# Patient Record
Sex: MALE | Race: WHITE | NOT HISPANIC OR LATINO | Employment: UNEMPLOYED | URBAN - NONMETROPOLITAN AREA
[De-identification: names, ages, dates, MRNs, and addresses within clinical notes are randomized per-mention and may not be internally consistent; named-entity substitution may affect disease eponyms.]

---

## 2019-07-12 ENCOUNTER — TRANSCRIBE ORDERS (OUTPATIENT)
Dept: ADMINISTRATIVE | Facility: HOSPITAL | Age: 39
End: 2019-07-12

## 2019-07-12 ENCOUNTER — LAB (OUTPATIENT)
Dept: LAB | Facility: HOSPITAL | Age: 39
End: 2019-07-12

## 2019-07-12 ENCOUNTER — HOSPITAL ENCOUNTER (OUTPATIENT)
Dept: INFUSION THERAPY | Facility: HOSPITAL | Age: 39
Setting detail: INFUSION SERIES
Discharge: HOME | End: 2019-07-12

## 2019-07-12 VITALS
RESPIRATION RATE: 18 BRPM | HEIGHT: 69 IN | WEIGHT: 200 LBS | DIASTOLIC BLOOD PRESSURE: 74 MMHG | SYSTOLIC BLOOD PRESSURE: 120 MMHG | BODY MASS INDEX: 29.62 KG/M2 | TEMPERATURE: 97.9 F | OXYGEN SATURATION: 95 % | HEART RATE: 75 BPM

## 2019-07-12 DIAGNOSIS — D75.1 POLYCYTHEMIA: Primary | ICD-10-CM

## 2019-07-12 DIAGNOSIS — D75.1 POLYCYTHEMIA, SECONDARY: Primary | ICD-10-CM

## 2019-07-12 DIAGNOSIS — D75.1 POLYCYTHEMIA, SECONDARY: ICD-10-CM

## 2019-07-12 LAB
DEPRECATED RDW RBC AUTO: 49.6 FL (ref 37–54)
ERYTHROCYTE [DISTWIDTH] IN BLOOD BY AUTOMATED COUNT: 16.8 % (ref 12.3–15.4)
HCT VFR BLD AUTO: 46 % (ref 37.5–51)
HGB BLD-MCNC: 14.9 G/DL (ref 13–17.7)
MCH RBC QN AUTO: 27.2 PG (ref 26.6–33)
MCHC RBC AUTO-ENTMCNC: 32.4 G/DL (ref 31.5–35.7)
MCV RBC AUTO: 83.9 FL (ref 79–97)
PLATELET # BLD AUTO: 385 10*3/MM3 (ref 140–450)
PMV BLD AUTO: 9.4 FL (ref 6–12)
RBC # BLD AUTO: 5.48 10*6/MM3 (ref 4.14–5.8)
WBC NRBC COR # BLD: 10.33 10*3/MM3 (ref 3.4–10.8)

## 2019-07-12 PROCEDURE — 85027 COMPLETE CBC AUTOMATED: CPT

## 2019-07-12 PROCEDURE — 36415 COLL VENOUS BLD VENIPUNCTURE: CPT

## 2019-07-12 PROCEDURE — 99195 PHLEBOTOMY: CPT

## 2019-07-12 RX ORDER — ASPIRIN 81 MG/1
81 TABLET ORAL DAILY
COMMUNITY
End: 2022-09-26

## 2019-07-12 RX ORDER — SODIUM CHLORIDE 9 MG/ML
250 INJECTION, SOLUTION INTRAVENOUS ONCE
Status: CANCELLED | OUTPATIENT
Start: 2019-07-12

## 2019-07-12 RX ORDER — SODIUM CHLORIDE 9 MG/ML
250 INJECTION, SOLUTION INTRAVENOUS ONCE
Status: DISCONTINUED | OUTPATIENT
Start: 2019-07-12 | End: 2019-07-14 | Stop reason: HOSPADM

## 2019-07-12 RX ORDER — CITALOPRAM 20 MG/1
20 TABLET ORAL DAILY
COMMUNITY
End: 2021-08-06 | Stop reason: SDUPTHER

## 2019-07-12 RX ORDER — LISINOPRIL 10 MG/1
10 TABLET ORAL DAILY
COMMUNITY
End: 2021-12-28 | Stop reason: SDUPTHER

## 2019-07-26 ENCOUNTER — HOSPITAL ENCOUNTER (OUTPATIENT)
Dept: INFUSION THERAPY | Facility: HOSPITAL | Age: 39
Setting detail: INFUSION SERIES
Discharge: HOME | End: 2019-07-26

## 2019-07-26 VITALS
HEIGHT: 69 IN | HEART RATE: 74 BPM | DIASTOLIC BLOOD PRESSURE: 80 MMHG | WEIGHT: 200 LBS | BODY MASS INDEX: 29.62 KG/M2 | SYSTOLIC BLOOD PRESSURE: 140 MMHG | TEMPERATURE: 98.4 F

## 2019-07-26 DIAGNOSIS — D75.1 POLYCYTHEMIA: Primary | ICD-10-CM

## 2019-07-26 LAB
DEPRECATED RDW RBC AUTO: 49.4 FL (ref 37–54)
ERYTHROCYTE [DISTWIDTH] IN BLOOD BY AUTOMATED COUNT: 16 % (ref 12.3–15.4)
HCT VFR BLD AUTO: 45.6 % (ref 37.5–51)
HGB BLD-MCNC: 14.6 G/DL (ref 13–17.7)
MCH RBC QN AUTO: 26.9 PG (ref 26.6–33)
MCHC RBC AUTO-ENTMCNC: 32 G/DL (ref 31.5–35.7)
MCV RBC AUTO: 84 FL (ref 79–97)
PLATELET # BLD AUTO: 384 10*3/MM3 (ref 140–450)
PMV BLD AUTO: 9.5 FL (ref 6–12)
RBC # BLD AUTO: 5.43 10*6/MM3 (ref 4.14–5.8)
WBC NRBC COR # BLD: 11.96 10*3/MM3 (ref 3.4–10.8)

## 2019-07-26 PROCEDURE — 36415 COLL VENOUS BLD VENIPUNCTURE: CPT

## 2019-07-26 PROCEDURE — 85027 COMPLETE CBC AUTOMATED: CPT | Performed by: INTERNAL MEDICINE

## 2019-07-26 PROCEDURE — 99195 PHLEBOTOMY: CPT

## 2019-07-26 RX ORDER — SODIUM CHLORIDE 9 MG/ML
250 INJECTION, SOLUTION INTRAVENOUS ONCE
Status: DISCONTINUED | OUTPATIENT
Start: 2019-07-26 | End: 2019-07-28 | Stop reason: HOSPADM

## 2019-07-26 RX ORDER — SODIUM CHLORIDE 9 MG/ML
250 INJECTION, SOLUTION INTRAVENOUS ONCE
Status: CANCELLED | OUTPATIENT
Start: 2019-07-26

## 2019-11-19 ENCOUNTER — OFFICE VISIT (OUTPATIENT)
Dept: NEUROLOGY | Facility: CLINIC | Age: 39
End: 2019-11-19

## 2019-11-19 ENCOUNTER — LAB (OUTPATIENT)
Dept: LAB | Facility: HOSPITAL | Age: 39
End: 2019-11-19

## 2019-11-19 VITALS
BODY MASS INDEX: 29.62 KG/M2 | SYSTOLIC BLOOD PRESSURE: 148 MMHG | OXYGEN SATURATION: 97 % | WEIGHT: 200 LBS | DIASTOLIC BLOOD PRESSURE: 100 MMHG | HEART RATE: 78 BPM | HEIGHT: 69 IN

## 2019-11-19 DIAGNOSIS — Z86.73 HISTORY OF STROKE: ICD-10-CM

## 2019-11-19 DIAGNOSIS — D75.1 POLYCYTHEMIA, SECONDARY: ICD-10-CM

## 2019-11-19 DIAGNOSIS — R41.3 MEMORY LOSS: Primary | ICD-10-CM

## 2019-11-19 LAB
DEPRECATED RDW RBC AUTO: 38.7 FL (ref 37–54)
ERYTHROCYTE [DISTWIDTH] IN BLOOD BY AUTOMATED COUNT: 13.6 % (ref 12.3–15.4)
FOLATE SERPL-MCNC: 10.8 NG/ML (ref 4.78–24.2)
HCT VFR BLD AUTO: 42.7 % (ref 37.5–51)
HGB BLD-MCNC: 13.6 G/DL (ref 13–17.7)
MCH RBC QN AUTO: 25.1 PG (ref 26.6–33)
MCHC RBC AUTO-ENTMCNC: 31.9 G/DL (ref 31.5–35.7)
MCV RBC AUTO: 78.8 FL (ref 79–97)
PLATELET # BLD AUTO: 483 10*3/MM3 (ref 140–450)
PMV BLD AUTO: 9.8 FL (ref 6–12)
RBC # BLD AUTO: 5.42 10*6/MM3 (ref 4.14–5.8)
VIT B12 BLD-MCNC: 502 PG/ML (ref 211–946)
WBC NRBC COR # BLD: 6.81 10*3/MM3 (ref 3.4–10.8)

## 2019-11-19 PROCEDURE — 85027 COMPLETE CBC AUTOMATED: CPT

## 2019-11-19 PROCEDURE — 99204 OFFICE O/P NEW MOD 45 MIN: CPT | Performed by: PHYSICIAN ASSISTANT

## 2019-11-19 PROCEDURE — 36415 COLL VENOUS BLD VENIPUNCTURE: CPT | Performed by: PHYSICIAN ASSISTANT

## 2019-11-19 PROCEDURE — 82607 VITAMIN B-12: CPT | Performed by: PHYSICIAN ASSISTANT

## 2019-11-19 PROCEDURE — 82746 ASSAY OF FOLIC ACID SERUM: CPT | Performed by: PHYSICIAN ASSISTANT

## 2019-11-19 NOTE — PROGRESS NOTES
Subjective     Chief Complaint: memory loss      History of Present Illness   Girma Chapman is a 39 y.o. male who comes to clinic today for evaluation of memory loss . He has noted symptoms following a CVA in 6/18 marked by forgetfulness and word-finding difficulties. This has remained static over time. Additional symptoms have included impairments in concentration, orientation  and executive function. He has lost several jobs due to difficulty mixing up the schedule. There have been associated  symptoms of depression and anxiety. He denies impairments in ADL's. He manages his medications and finances. He is currently residing independently in [facility] in [place].     He was hospitalized for a CVA at  in 6/18 with sudden onset headache, left sided weakness, and left facial drooping. An MRI of the brain at this time was reportedly notable for an acute ischemic right MCA infarct. A CTA reportedly showed right M1 stenosis. An echo was unremarkable for any cardio-embolic source. 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 He denies taking any stimulants or recreational drugs prior to his stroke, though states that he was taking supplements to augment his weight lifting performance. His weakness has significantly improved, though he continues to note some incoordination in his left upper extremity. He is currently taking ASA 81mg daily.       I have reviewed and confirmed the past family, social and medical history as accurate on 11/19/19.     Review of Systems   Constitutional: Negative.    HENT: Negative.    Eyes: Negative.    Respiratory: Negative.    Cardiovascular: Negative.    Gastrointestinal: Negative.    Endocrine: Negative.    Genitourinary: Negative.    Musculoskeletal: Negative.    Skin: Negative.    Allergic/Immunologic: Negative.    Neurological:        Memory loss    Hematological: Negative.    Psychiatric/Behavioral: Positive for dysphoric mood.       Objective     /100    "Pulse 78   Ht 175.3 cm (69\")   Wt 90.7 kg (200 lb)   SpO2 97%   BMI 29.53 kg/m²     General appearance today is normal.   Peripheral pulses were present and symmetric.  The ophthalmoscopic exam today is unremarkable. The discs and posterior elements are unremarkable.      Physical Exam   Constitutional: He is oriented to person, place, and time.   Neurological: He is oriented to person, place, and time. He has normal strength. He has a normal Finger-Nose-Finger Test. Gait normal.   Reflex Scores:       Bicep reflexes are 2+ on the right side and 2+ on the left side.       Brachioradialis reflexes are 2+ on the right side and 2+ on the left side.       Patellar reflexes are 2+ on the right side and 2+ on the left side.  Psychiatric: His speech is normal.        Neurologic Exam     Mental Status   Oriented to person, place, and time.   Registration: recalls 3 of 3 objects. Recall at 5 minutes: recalls 1 of 3 objects. Follows 3 step commands.   Attention: normal.   Speech: speech is normal   Level of consciousness: alert  Able to name object. Able to read. Able to repeat. Able to write. Normal comprehension.     Cranial Nerves   Cranial nerves II through XII intact.     Motor Exam   Muscle bulk: normal  Overall muscle tone: normal    Strength   Strength 5/5 throughout.     Sensory Exam   Light touch normal.     Gait, Coordination, and Reflexes     Gait  Gait: normal    Coordination   Finger to nose coordination: normal    Tremor   Resting tremor: absent    Reflexes   Right brachioradialis: 2+  Left brachioradialis: 2+  Right biceps: 2+  Left biceps: 2+  Right patellar: 2+  Left patellar: 2+        Results  MMSE=28      Assessment/Plan   Girma was seen today for neurologic problem, stroke and memory loss.    Diagnoses and all orders for this visit:    Memory loss  -     Folate  -     Vitamin B12  -     Ambulatory Referral to Speech Therapy    History of stroke          Discussion/Summary   Girma Chapman comes to clinic " today for evaluation of cognitive impairment. I am concerned that his symptoms are related to his history of stroke, though anxiety and depression may also be negatively affecting his cognition. This was discussed in detail with the patient.  It was elected to obtain screening blood work as well as his records from  including neuroimaging and anticoagulation profile. I have also made a referral to SLP for cognitive rehabilitation. We discussed potentially adding a cognitive enhancer, though this was reasonably declined for now. He will continue on ASA unchanged. I encouraged him to continue following closely with hematology regarding his history of polycythemia. He will then follow up in 6 months, or sooner if needed.   I spent 45 minutes face to face with the patient with 30 minutes spent on discussing diagnosis, prognosis, diagnostic testing, evaluation, current status, treatment options and management as discussed above.       As part of this visit I reviewed prior lab results, reviewed radiology results, reviewed outside records and obtained additional history from the family which is incorporated in the HPI.      Tammy Hernández PA-C

## 2019-11-20 ENCOUNTER — TELEPHONE (OUTPATIENT)
Dept: NEUROLOGY | Facility: CLINIC | Age: 39
End: 2019-11-20

## 2021-07-12 ENCOUNTER — APPOINTMENT (OUTPATIENT)
Dept: CT IMAGING | Facility: HOSPITAL | Age: 41
End: 2021-07-12

## 2021-07-12 ENCOUNTER — HOSPITAL ENCOUNTER (EMERGENCY)
Facility: HOSPITAL | Age: 41
Discharge: HOME | End: 2021-07-12
Attending: EMERGENCY MEDICINE | Admitting: EMERGENCY MEDICINE

## 2021-07-12 VITALS
SYSTOLIC BLOOD PRESSURE: 120 MMHG | HEART RATE: 86 BPM | RESPIRATION RATE: 16 BRPM | WEIGHT: 198 LBS | DIASTOLIC BLOOD PRESSURE: 73 MMHG | OXYGEN SATURATION: 95 % | BODY MASS INDEX: 29.33 KG/M2 | TEMPERATURE: 98.2 F | HEIGHT: 69 IN

## 2021-07-12 DIAGNOSIS — F12.188 CANNABIS HYPEREMESIS SYNDROME CONCURRENT WITH AND DUE TO CANNABIS ABUSE (HCC): Primary | ICD-10-CM

## 2021-07-12 LAB
ALBUMIN SERPL-MCNC: 4 G/DL (ref 3.5–5.2)
ALBUMIN/GLOB SERPL: 1 G/DL
ALP SERPL-CCNC: 57 U/L (ref 39–117)
ALT SERPL W P-5'-P-CCNC: 26 U/L (ref 1–41)
ANION GAP SERPL CALCULATED.3IONS-SCNC: 14.5 MMOL/L (ref 5–15)
AST SERPL-CCNC: 32 U/L (ref 1–40)
BASOPHILS # BLD AUTO: 0.09 10*3/MM3 (ref 0–0.2)
BASOPHILS NFR BLD AUTO: 0.7 % (ref 0–1.5)
BILIRUB SERPL-MCNC: 0.7 MG/DL (ref 0–1.2)
BUN SERPL-MCNC: 7 MG/DL (ref 6–20)
BUN/CREAT SERPL: 5.1 (ref 7–25)
CALCIUM SPEC-SCNC: 9.4 MG/DL (ref 8.6–10.5)
CHLORIDE SERPL-SCNC: 97 MMOL/L (ref 98–107)
CO2 SERPL-SCNC: 24.5 MMOL/L (ref 22–29)
CREAT SERPL-MCNC: 1.37 MG/DL (ref 0.76–1.27)
DEPRECATED RDW RBC AUTO: 48.2 FL (ref 37–54)
EOSINOPHIL # BLD AUTO: 0.01 10*3/MM3 (ref 0–0.4)
EOSINOPHIL NFR BLD AUTO: 0.1 % (ref 0.3–6.2)
ERYTHROCYTE [DISTWIDTH] IN BLOOD BY AUTOMATED COUNT: 15.2 % (ref 12.3–15.4)
GFR SERPL CREATININE-BSD FRML MDRD: 58 ML/MIN/1.73
GLOBULIN UR ELPH-MCNC: 4.1 GM/DL
GLUCOSE SERPL-MCNC: 105 MG/DL (ref 65–99)
HCT VFR BLD AUTO: 57.1 % (ref 37.5–51)
HGB BLD-MCNC: 19.1 G/DL (ref 13–17.7)
IMM GRANULOCYTES # BLD AUTO: 0.05 10*3/MM3 (ref 0–0.05)
IMM GRANULOCYTES NFR BLD AUTO: 0.4 % (ref 0–0.5)
LIPASE SERPL-CCNC: 36 U/L (ref 13–60)
LYMPHOCYTES # BLD AUTO: 0.97 10*3/MM3 (ref 0.7–3.1)
LYMPHOCYTES NFR BLD AUTO: 7.8 % (ref 19.6–45.3)
MAGNESIUM SERPL-MCNC: 1.4 MG/DL (ref 1.6–2.6)
MCH RBC QN AUTO: 29.5 PG (ref 26.6–33)
MCHC RBC AUTO-ENTMCNC: 33.5 G/DL (ref 31.5–35.7)
MCV RBC AUTO: 88.1 FL (ref 79–97)
MONOCYTES # BLD AUTO: 0.36 10*3/MM3 (ref 0.1–0.9)
MONOCYTES NFR BLD AUTO: 2.9 % (ref 5–12)
NEUTROPHILS NFR BLD AUTO: 10.88 10*3/MM3 (ref 1.7–7)
NEUTROPHILS NFR BLD AUTO: 88.1 % (ref 42.7–76)
NRBC BLD AUTO-RTO: 0.2 /100 WBC (ref 0–0.2)
PLATELET # BLD AUTO: 413 10*3/MM3 (ref 140–450)
PMV BLD AUTO: 9.4 FL (ref 6–12)
POTASSIUM SERPL-SCNC: 5 MMOL/L (ref 3.5–5.2)
PROT SERPL-MCNC: 8.1 G/DL (ref 6–8.5)
RBC # BLD AUTO: 6.48 10*6/MM3 (ref 4.14–5.8)
SODIUM SERPL-SCNC: 136 MMOL/L (ref 136–145)
WBC # BLD AUTO: 12.36 10*3/MM3 (ref 3.4–10.8)

## 2021-07-12 PROCEDURE — 80053 COMPREHEN METABOLIC PANEL: CPT | Performed by: NURSE PRACTITIONER

## 2021-07-12 PROCEDURE — 74177 CT ABD & PELVIS W/CONTRAST: CPT

## 2021-07-12 PROCEDURE — 85025 COMPLETE CBC W/AUTO DIFF WBC: CPT | Performed by: NURSE PRACTITIONER

## 2021-07-12 PROCEDURE — 96375 TX/PRO/DX INJ NEW DRUG ADDON: CPT

## 2021-07-12 PROCEDURE — 99283 EMERGENCY DEPT VISIT LOW MDM: CPT

## 2021-07-12 PROCEDURE — 96374 THER/PROPH/DIAG INJ IV PUSH: CPT

## 2021-07-12 PROCEDURE — 25010000002 DROPERIDOL PER 5 MG: Performed by: NURSE PRACTITIONER

## 2021-07-12 PROCEDURE — 83735 ASSAY OF MAGNESIUM: CPT | Performed by: NURSE PRACTITIONER

## 2021-07-12 PROCEDURE — 83690 ASSAY OF LIPASE: CPT | Performed by: NURSE PRACTITIONER

## 2021-07-12 PROCEDURE — 25010000002 DIPHENHYDRAMINE PER 50 MG: Performed by: NURSE PRACTITIONER

## 2021-07-12 PROCEDURE — 25010000002 IOPAMIDOL 61 % SOLUTION: Performed by: EMERGENCY MEDICINE

## 2021-07-12 RX ORDER — ONDANSETRON 4 MG/1
4 TABLET, ORALLY DISINTEGRATING ORAL EVERY 6 HOURS PRN
Qty: 14 TABLET | Refills: 0 | Status: SHIPPED | OUTPATIENT
Start: 2021-07-12 | End: 2021-09-01

## 2021-07-12 RX ORDER — SODIUM CHLORIDE 0.9 % (FLUSH) 0.9 %
10 SYRINGE (ML) INJECTION AS NEEDED
Status: DISCONTINUED | OUTPATIENT
Start: 2021-07-12 | End: 2021-07-12 | Stop reason: HOSPADM

## 2021-07-12 RX ORDER — DICYCLOMINE HCL 20 MG
20 TABLET ORAL EVERY 6 HOURS PRN
Qty: 12 TABLET | Refills: 0 | Status: SHIPPED | OUTPATIENT
Start: 2021-07-12

## 2021-07-12 RX ORDER — DROPERIDOL 2.5 MG/ML
2.5 INJECTION, SOLUTION INTRAMUSCULAR; INTRAVENOUS ONCE
Status: COMPLETED | OUTPATIENT
Start: 2021-07-12 | End: 2021-07-12

## 2021-07-12 RX ORDER — DIPHENHYDRAMINE HYDROCHLORIDE 50 MG/ML
25 INJECTION INTRAMUSCULAR; INTRAVENOUS ONCE
Status: COMPLETED | OUTPATIENT
Start: 2021-07-12 | End: 2021-07-12

## 2021-07-12 RX ADMIN — SODIUM CHLORIDE 1000 ML: 9 INJECTION, SOLUTION INTRAVENOUS at 09:53

## 2021-07-12 RX ADMIN — DIPHENHYDRAMINE HYDROCHLORIDE 25 MG: 50 INJECTION INTRAMUSCULAR; INTRAVENOUS at 09:51

## 2021-07-12 RX ADMIN — DROPERIDOL 2.5 MG: 2.5 INJECTION, SOLUTION INTRAMUSCULAR; INTRAVENOUS at 09:51

## 2021-07-12 RX ADMIN — IOPAMIDOL 100 ML: 612 INJECTION, SOLUTION INTRAVENOUS at 10:14

## 2021-07-12 RX ADMIN — SODIUM CHLORIDE 1000 ML: 9 INJECTION, SOLUTION INTRAVENOUS at 10:38

## 2021-07-12 NOTE — ED PROVIDER NOTES
"Subjective   Patient presents to the emergency department with complaint of awakening approximately 2 AM with cyclical vomiting without hemoptysis or hematemesis.  He has had gnawing epigastric and periumbilical abdominal pain without diarrhea.  He has no surgical abdominal history.  Patient does use THC.  He has no prior history of cannabis hyperemesis.  \"My physical therapist told me would help me since my stroke\".    Patient has past medical history of CVA with hypertension.  He has not had his hypertensive medications today.  Due to vomiting      History provided by:  Patient and parent   used: No    Abdominal Pain  Pain location:  Epigastric and periumbilical  Pain quality: aching    Pain radiates to:  Does not radiate  Pain severity:  Moderate  Onset quality:  Gradual  Timing:  Constant  Progression:  Unchanged  Chronicity:  New  Context: awakening from sleep and retching    Context: not alcohol use and not sick contacts    Relieved by:  Nothing  Worsened by:  Nothing  Ineffective treatments:  None tried  Associated symptoms: nausea and vomiting    Associated symptoms: no belching, no chest pain, no chills, no constipation, no cough, no diarrhea, no dysuria, no fever, no shortness of breath and no sore throat    Risk factors: no alcohol abuse        Review of Systems   Constitutional: Negative.  Negative for chills, diaphoresis and fever.   HENT: Negative for sore throat.    Eyes: Negative.  Negative for pain and visual disturbance.   Respiratory: Negative for cough, shortness of breath, wheezing and stridor.    Cardiovascular: Negative.  Negative for chest pain.   Gastrointestinal: Positive for abdominal pain, nausea and vomiting. Negative for constipation and diarrhea.   Endocrine: Negative.    Genitourinary: Negative.  Negative for dysuria.   Musculoskeletal: Negative.  Negative for back pain and neck pain.   Skin: Negative.  Negative for pallor and rash.   Allergic/Immunologic: " Negative.    Neurological: Negative.  Negative for syncope and headaches.   Hematological: Negative.    Psychiatric/Behavioral: Negative.  Negative for agitation.   All other systems reviewed and are negative.      Past Medical History:   Diagnosis Date   • Anxiety    • Depression    • Glaucoma    • Hypertension    • Screening for neurological condition    • Stroke (CMS/HCC)        No Known Allergies    Past Surgical History:   Procedure Laterality Date   • FRACTURE SURGERY Right     ankle       Family History   Problem Relation Age of Onset   • Dementia Maternal Grandmother        Social History     Socioeconomic History   • Marital status: Single     Spouse name: Not on file   • Number of children: Not on file   • Years of education: Not on file   • Highest education level: Not on file   Tobacco Use   • Smoking status: Never Smoker   • Smokeless tobacco: Never Used   Substance and Sexual Activity   • Alcohol use: Yes   • Drug use: No   • Sexual activity: Defer           Objective   Physical Exam  Vitals and nursing note reviewed.   Constitutional:       General: He is not in acute distress.     Appearance: Normal appearance. He is not ill-appearing or diaphoretic.   HENT:      Head: Normocephalic.      Right Ear: External ear normal.      Left Ear: External ear normal.      Nose: Nose normal.      Mouth/Throat:      Mouth: Mucous membranes are moist.      Pharynx: No oropharyngeal exudate.   Eyes:      Conjunctiva/sclera: Conjunctivae normal.   Cardiovascular:      Rate and Rhythm: Normal rate and regular rhythm.      Heart sounds: No murmur heard.     Pulmonary:      Effort: Pulmonary effort is normal. No respiratory distress.      Breath sounds: Normal breath sounds.   Abdominal:      General: Bowel sounds are normal. There is no distension.      Palpations: Abdomen is soft.      Tenderness: There is abdominal tenderness (epigastric ). There is no guarding.   Musculoskeletal:         General: No swelling,  tenderness or deformity. Normal range of motion.      Cervical back: Normal range of motion and neck supple. No muscular tenderness.   Skin:     General: Skin is warm.      Capillary Refill: Capillary refill takes less than 2 seconds.      Coloration: Skin is not pale.      Findings: No lesion.   Neurological:      General: No focal deficit present.      Mental Status: He is alert and oriented to person, place, and time.      Comments: No Neurosensory deficit or focal weakness     Psychiatric:         Behavior: Behavior normal.         Thought Content: Thought content normal.         Procedures           ED Course  ED Course as of Jul 12 1154   Mon Jul 12, 2021   1012 WBC(!): 12.36 [MS]   1012 Hemoglobin(!): 19.1 [MS]   1012 Hematocrit(!): 57.1 [MS]   1152 Patient is feeling much better.  He has had no vomiting during his ED course.  We discussed parameters for concern that would warrant return to the emergency department.  Mr. Chapman understands and concurs this outpatient plan of care close follow-up with discontinuation of marijuana.    [MS]      ED Course User Index  [MS] Nahomy Weiner, MARY CARMEN            Recent Results (from the past 24 hour(s))   Comprehensive Metabolic Panel    Collection Time: 07/12/21  9:50 AM    Specimen: Blood   Result Value Ref Range    Glucose 105 (H) 65 - 99 mg/dL    BUN 7 6 - 20 mg/dL    Creatinine 1.37 (H) 0.76 - 1.27 mg/dL    Sodium 136 136 - 145 mmol/L    Potassium 5.0 3.5 - 5.2 mmol/L    Chloride 97 (L) 98 - 107 mmol/L    CO2 24.5 22.0 - 29.0 mmol/L    Calcium 9.4 8.6 - 10.5 mg/dL    Total Protein 8.1 6.0 - 8.5 g/dL    Albumin 4.00 3.50 - 5.20 g/dL    ALT (SGPT) 26 1 - 41 U/L    AST (SGOT) 32 1 - 40 U/L    Alkaline Phosphatase 57 39 - 117 U/L    Total Bilirubin 0.7 0.0 - 1.2 mg/dL    eGFR Non African Amer 58 (L) >60 mL/min/1.73    Globulin 4.1 gm/dL    A/G Ratio 1.0 g/dL    BUN/Creatinine Ratio 5.1 (L) 7.0 - 25.0    Anion Gap 14.5 5.0 - 15.0 mmol/L   Lipase    Collection Time:  07/12/21  9:50 AM    Specimen: Blood   Result Value Ref Range    Lipase 36 13 - 60 U/L   Magnesium    Collection Time: 07/12/21  9:50 AM    Specimen: Blood   Result Value Ref Range    Magnesium 1.4 (L) 1.6 - 2.6 mg/dL   CBC Auto Differential    Collection Time: 07/12/21  9:50 AM    Specimen: Blood   Result Value Ref Range    WBC 12.36 (H) 3.40 - 10.80 10*3/mm3    RBC 6.48 (H) 4.14 - 5.80 10*6/mm3    Hemoglobin 19.1 (H) 13.0 - 17.7 g/dL    Hematocrit 57.1 (H) 37.5 - 51.0 %    MCV 88.1 79.0 - 97.0 fL    MCH 29.5 26.6 - 33.0 pg    MCHC 33.5 31.5 - 35.7 g/dL    RDW 15.2 12.3 - 15.4 %    RDW-SD 48.2 37.0 - 54.0 fl    MPV 9.4 6.0 - 12.0 fL    Platelets 413 140 - 450 10*3/mm3    Neutrophil % 88.1 (H) 42.7 - 76.0 %    Lymphocyte % 7.8 (L) 19.6 - 45.3 %    Monocyte % 2.9 (L) 5.0 - 12.0 %    Eosinophil % 0.1 (L) 0.3 - 6.2 %    Basophil % 0.7 0.0 - 1.5 %    Immature Grans % 0.4 0.0 - 0.5 %    Neutrophils, Absolute 10.88 (H) 1.70 - 7.00 10*3/mm3    Lymphocytes, Absolute 0.97 0.70 - 3.10 10*3/mm3    Monocytes, Absolute 0.36 0.10 - 0.90 10*3/mm3    Eosinophils, Absolute 0.01 0.00 - 0.40 10*3/mm3    Basophils, Absolute 0.09 0.00 - 0.20 10*3/mm3    Immature Grans, Absolute 0.05 0.00 - 0.05 10*3/mm3    nRBC 0.2 0.0 - 0.2 /100 WBC     Note: In addition to lab results from this visit, the labs listed above may include labs taken at another facility or during a different encounter within the last 24 hours. Please correlate lab times with ED admission and discharge times for further clarification of the services performed during this visit.    CT Abdomen Pelvis With Contrast   Final Result   No evidence of acute intra-abdominal process.       595.04 mGy.cm           This study was performed with techniques to keep radiation doses as low   as reasonably achievable (ALARA). Individualized dose reduction   techniques using automated exposure control or adjustment of mA and/or   kV according to the patient size were employed.        This  "report was finalized on 7/12/2021 10:21 AM by Sahil Carver M.D..        Vitals:    07/12/21 0916 07/12/21 1039   BP: (!) 170/120 120/73   BP Location: Right arm Right arm   Patient Position: Sitting Sitting   Pulse: 72 86   Resp: 16 16   Temp: 98.2 °F (36.8 °C)    TempSrc: Oral    SpO2: 100% 95%   Weight: 89.8 kg (198 lb)    Height: 175.3 cm (69\")      Medications   sodium chloride 0.9 % flush 10 mL (has no administration in time range)   sodium chloride 0.9 % bolus 1,000 mL (1,000 mL Intravenous New Bag 7/12/21 0953)   droperidol (INAPSINE) injection 2.5 mg (2.5 mg Intravenous Given 7/12/21 0951)   diphenhydrAMINE (BENADRYL) injection 25 mg (25 mg Intravenous Given 7/12/21 0951)   iopamidol (ISOVUE-300) 61 % injection 100 mL (100 mL Intravenous Given 7/12/21 1014)   sodium chloride 0.9 % bolus 1,000 mL (1,000 mL Intravenous New Bag 7/12/21 1038)     ECG/EMG Results (last 24 hours)     ** No results found for the last 24 hours. **        No orders to display                                         MDM    Final diagnoses:   Cannabis hyperemesis syndrome concurrent with and due to cannabis abuse (CMS/[facility])       ED Disposition  ED Disposition     ED Disposition Condition Comment    Discharge Stable           Girma Arambula, DO  [address] [place] [id number]  [phone]    Schedule an appointment as soon as possible for a visit in 2 days  If symptoms worsen         Medication List      New Prescriptions    dicyclomine 20 MG tablet  Commonly known as: BENTYL  Take 1 tablet by mouth Every 6 (Six) Hours As Needed (Abdominal cramping).     ondansetron ODT 4 MG disintegrating tablet  Commonly known as: ZOFRAN-ODT  Place 1 tablet on the tongue Every 6 (Six) Hours As Needed for Nausea or Vomiting.           Where to Get Your Medications      These medications were sent to [facility] [address] [place] [address] [phone] [place] [phone]   [address] [facility] [address]    " Phone: [phone]   · dicyclomine 20 MG tablet  · ondansetron ODT 4 MG disintegrating tablet          Nahomy Weiner, APRN  07/12/21 3289

## 2021-07-12 NOTE — DISCHARGE INSTRUCTIONS
Home to rest.  Maintain your very best hydration and nutrition.  Follow-up with Dr. Sevilla to monitor your recovery.  Medications have been forwarded to your personal pharmacy for nausea and abdominal cramping.  Thank you

## 2021-07-14 ENCOUNTER — APPOINTMENT (OUTPATIENT)
Dept: CT IMAGING | Facility: HOSPITAL | Age: 41
End: 2021-07-14

## 2021-07-14 ENCOUNTER — HOSPITAL ENCOUNTER (INPATIENT)
Facility: HOSPITAL | Age: 41
LOS: 2 days | Discharge: HOME | End: 2021-07-16
Attending: EMERGENCY MEDICINE | Admitting: INTERNAL MEDICINE

## 2021-07-14 DIAGNOSIS — I21.11 ST ELEVATION MYOCARDIAL INFARCTION INVOLVING RIGHT CORONARY ARTERY (HCC): ICD-10-CM

## 2021-07-14 DIAGNOSIS — I21.3 ST ELEVATION MYOCARDIAL INFARCTION (STEMI), UNSPECIFIED ARTERY (HCC): Primary | ICD-10-CM

## 2021-07-14 DIAGNOSIS — D75.1 POLYCYTHEMIA: ICD-10-CM

## 2021-07-14 DIAGNOSIS — N28.9 ACUTE RENAL INSUFFICIENCY: ICD-10-CM

## 2021-07-14 LAB
ALBUMIN SERPL-MCNC: 4.3 G/DL (ref 3.5–5.2)
ALBUMIN/GLOB SERPL: 1 G/DL
ALP SERPL-CCNC: 78 U/L (ref 39–117)
ALT SERPL W P-5'-P-CCNC: 31 U/L (ref 1–41)
ANION GAP SERPL CALCULATED.3IONS-SCNC: 38.8 MMOL/L (ref 5–15)
AST SERPL-CCNC: 58 U/L (ref 1–40)
BASOPHILS # BLD AUTO: 0.14 10*3/MM3 (ref 0–0.2)
BASOPHILS NFR BLD AUTO: 0.6 % (ref 0–1.5)
BILIRUB SERPL-MCNC: 0.7 MG/DL (ref 0–1.2)
BUN SERPL-MCNC: 10 MG/DL (ref 6–20)
BUN/CREAT SERPL: 5.8 (ref 7–25)
CALCIUM SPEC-SCNC: 9.9 MG/DL (ref 8.6–10.5)
CHLORIDE SERPL-SCNC: 90 MMOL/L (ref 98–107)
CO2 SERPL-SCNC: 7.2 MMOL/L (ref 22–29)
CREAT SERPL-MCNC: 1.72 MG/DL (ref 0.76–1.27)
DEPRECATED RDW RBC AUTO: 52.6 FL (ref 37–54)
EOSINOPHIL # BLD AUTO: 0.25 10*3/MM3 (ref 0–0.4)
EOSINOPHIL NFR BLD AUTO: 1 % (ref 0.3–6.2)
ERYTHROCYTE [DISTWIDTH] IN BLOOD BY AUTOMATED COUNT: 15.7 % (ref 12.3–15.4)
GFR SERPL CREATININE-BSD FRML MDRD: 45 ML/MIN/1.73
GLOBULIN UR ELPH-MCNC: 4.1 GM/DL
GLUCOSE SERPL-MCNC: 126 MG/DL (ref 65–99)
HCT VFR BLD AUTO: 60.5 % (ref 37.5–51)
HGB BLD-MCNC: 19.4 G/DL (ref 13–17.7)
IMM GRANULOCYTES # BLD AUTO: 0.62 10*3/MM3 (ref 0–0.05)
IMM GRANULOCYTES NFR BLD AUTO: 2.6 % (ref 0–0.5)
LYMPHOCYTES # BLD AUTO: 4.89 10*3/MM3 (ref 0.7–3.1)
LYMPHOCYTES NFR BLD AUTO: 20.3 % (ref 19.6–45.3)
MCH RBC QN AUTO: 30 PG (ref 26.6–33)
MCHC RBC AUTO-ENTMCNC: 32.1 G/DL (ref 31.5–35.7)
MCV RBC AUTO: 93.7 FL (ref 79–97)
MONOCYTES # BLD AUTO: 2.29 10*3/MM3 (ref 0.1–0.9)
MONOCYTES NFR BLD AUTO: 9.5 % (ref 5–12)
NEUTROPHILS NFR BLD AUTO: 15.84 10*3/MM3 (ref 1.7–7)
NEUTROPHILS NFR BLD AUTO: 66 % (ref 42.7–76)
NRBC BLD AUTO-RTO: 0 /100 WBC (ref 0–0.2)
PLATELET # BLD AUTO: 487 10*3/MM3 (ref 140–450)
PMV BLD AUTO: 10.5 FL (ref 6–12)
POTASSIUM SERPL-SCNC: 3.7 MMOL/L (ref 3.5–5.2)
PROT SERPL-MCNC: 8.4 G/DL (ref 6–8.5)
RBC # BLD AUTO: 6.46 10*6/MM3 (ref 4.14–5.8)
SODIUM SERPL-SCNC: 136 MMOL/L (ref 136–145)
TROPONIN T SERPL-MCNC: 0.21 NG/ML (ref 0–0.03)
WBC # BLD AUTO: 24.03 10*3/MM3 (ref 3.4–10.8)

## 2021-07-14 PROCEDURE — 027034Z DILATION OF CORONARY ARTERY, ONE ARTERY WITH DRUG-ELUTING INTRALUMINAL DEVICE, PERCUTANEOUS APPROACH: ICD-10-PCS | Performed by: INTERNAL MEDICINE

## 2021-07-14 PROCEDURE — 80306 DRUG TEST PRSMV INSTRMNT: CPT | Performed by: INTERNAL MEDICINE

## 2021-07-14 PROCEDURE — C9606 PERC D-E COR REVASC W AMI S: HCPCS | Performed by: INTERNAL MEDICINE

## 2021-07-14 PROCEDURE — 0 IOPAMIDOL PER 1 ML: Performed by: INTERNAL MEDICINE

## 2021-07-14 PROCEDURE — 93454 CORONARY ARTERY ANGIO S&I: CPT | Performed by: INTERNAL MEDICINE

## 2021-07-14 PROCEDURE — B221Z2Z COMPUTERIZED TOMOGRAPHY (CT SCAN) OF MULTIPLE CORONARY ARTERIES USING INTRAVASCULAR OPTICAL COHERENCE: ICD-10-PCS | Performed by: INTERNAL MEDICINE

## 2021-07-14 PROCEDURE — 25010000003 LIDOCAINE 1 % SOLUTION: Performed by: INTERNAL MEDICINE

## 2021-07-14 PROCEDURE — 99283 EMERGENCY DEPT VISIT LOW MDM: CPT

## 2021-07-14 PROCEDURE — 25010000002 MIDAZOLAM PER 1MG: Performed by: INTERNAL MEDICINE

## 2021-07-14 PROCEDURE — 80179 DRUG ASSAY SALICYLATE: CPT | Performed by: EMERGENCY MEDICINE

## 2021-07-14 PROCEDURE — 92978 ENDOLUMINL IVUS OCT C 1ST: CPT | Performed by: INTERNAL MEDICINE

## 2021-07-14 PROCEDURE — C1874 STENT, COATED/COV W/DEL SYS: HCPCS | Performed by: INTERNAL MEDICINE

## 2021-07-14 PROCEDURE — 99223 1ST HOSP IP/OBS HIGH 75: CPT | Performed by: INTERNAL MEDICINE

## 2021-07-14 PROCEDURE — 25010000002 BIVALIRUDIN TRIFLUOROACETATE 250 MG RECONSTITUTED SOLUTION: Performed by: INTERNAL MEDICINE

## 2021-07-14 PROCEDURE — 93005 ELECTROCARDIOGRAM TRACING: CPT | Performed by: EMERGENCY MEDICINE

## 2021-07-14 PROCEDURE — 92941 PRQ TRLML REVSC TOT OCCL AMI: CPT | Performed by: INTERNAL MEDICINE

## 2021-07-14 PROCEDURE — 85025 COMPLETE CBC W/AUTO DIFF WBC: CPT | Performed by: EMERGENCY MEDICINE

## 2021-07-14 PROCEDURE — 80053 COMPREHEN METABOLIC PANEL: CPT | Performed by: EMERGENCY MEDICINE

## 2021-07-14 PROCEDURE — C1769 GUIDE WIRE: HCPCS | Performed by: INTERNAL MEDICINE

## 2021-07-14 PROCEDURE — 25010000002 BIVALIRUDIN TRIFLUOROACETATE 250 MG RECONSTITUTED SOLUTION 1 EACH VIAL: Performed by: INTERNAL MEDICINE

## 2021-07-14 PROCEDURE — C1725 CATH, TRANSLUMIN NON-LASER: HCPCS | Performed by: INTERNAL MEDICINE

## 2021-07-14 PROCEDURE — 25010000002 ONDANSETRON PER 1 MG: Performed by: EMERGENCY MEDICINE

## 2021-07-14 PROCEDURE — 87635 SARS-COV-2 COVID-19 AMP PRB: CPT | Performed by: INTERNAL MEDICINE

## 2021-07-14 PROCEDURE — C1887 CATHETER, GUIDING: HCPCS | Performed by: INTERNAL MEDICINE

## 2021-07-14 PROCEDURE — C1894 INTRO/SHEATH, NON-LASER: HCPCS | Performed by: INTERNAL MEDICINE

## 2021-07-14 PROCEDURE — 84484 ASSAY OF TROPONIN QUANT: CPT | Performed by: EMERGENCY MEDICINE

## 2021-07-14 PROCEDURE — B2111ZZ FLUOROSCOPY OF MULTIPLE CORONARY ARTERIES USING LOW OSMOLAR CONTRAST: ICD-10-PCS | Performed by: INTERNAL MEDICINE

## 2021-07-14 PROCEDURE — C1753 CATH, INTRAVAS ULTRASOUND: HCPCS | Performed by: INTERNAL MEDICINE

## 2021-07-14 PROCEDURE — 25010000002 FENTANYL CITRATE (PF) 50 MCG/ML SOLUTION: Performed by: INTERNAL MEDICINE

## 2021-07-14 PROCEDURE — 25010000002 LORAZEPAM PER 2 MG: Performed by: EMERGENCY MEDICINE

## 2021-07-14 PROCEDURE — B240ZZ3 ULTRASONOGRAPHY OF SINGLE CORONARY ARTERY, INTRAVASCULAR: ICD-10-PCS | Performed by: INTERNAL MEDICINE

## 2021-07-14 PROCEDURE — 25010000002 HEPARIN (PORCINE) PER 1000 UNITS: Performed by: INTERNAL MEDICINE

## 2021-07-14 DEVICE — XIENCE SIERRA™ EVEROLIMUS ELUTING CORONARY STENT SYSTEM 4.00 MM X 18 MM / RAPID-EXCHANGE
Type: IMPLANTABLE DEVICE | Site: CORONARY | Status: FUNCTIONAL
Brand: XIENCE SIERRA™

## 2021-07-14 RX ORDER — BIVALIRUDIN 250 MG/5ML
INJECTION, POWDER, LYOPHILIZED, FOR SOLUTION INTRAVENOUS AS NEEDED
Status: DISCONTINUED | OUTPATIENT
Start: 2021-07-14 | End: 2021-07-14 | Stop reason: HOSPADM

## 2021-07-14 RX ORDER — ONDANSETRON 2 MG/ML
4 INJECTION INTRAMUSCULAR; INTRAVENOUS ONCE
Status: COMPLETED | OUTPATIENT
Start: 2021-07-14 | End: 2021-07-14

## 2021-07-14 RX ORDER — ASPIRIN 325 MG
TABLET ORAL AS NEEDED
Status: DISCONTINUED | OUTPATIENT
Start: 2021-07-14 | End: 2021-07-14 | Stop reason: HOSPADM

## 2021-07-14 RX ORDER — ACETAMINOPHEN 325 MG/1
650 TABLET ORAL EVERY 4 HOURS PRN
Status: DISCONTINUED | OUTPATIENT
Start: 2021-07-14 | End: 2021-07-16 | Stop reason: HOSPADM

## 2021-07-14 RX ORDER — ATORVASTATIN CALCIUM 80 MG/1
80 TABLET, FILM COATED ORAL NIGHTLY
Status: DISCONTINUED | OUTPATIENT
Start: 2021-07-15 | End: 2021-07-16 | Stop reason: HOSPADM

## 2021-07-14 RX ORDER — LORAZEPAM 2 MG/ML
2 INJECTION INTRAMUSCULAR ONCE
Status: COMPLETED | OUTPATIENT
Start: 2021-07-14 | End: 2021-07-14

## 2021-07-14 RX ORDER — LIDOCAINE HYDROCHLORIDE 10 MG/ML
INJECTION, SOLUTION INFILTRATION; PERINEURAL AS NEEDED
Status: DISCONTINUED | OUTPATIENT
Start: 2021-07-14 | End: 2021-07-14 | Stop reason: HOSPADM

## 2021-07-14 RX ORDER — ASPIRIN 81 MG/1
81 TABLET ORAL DAILY
Status: DISCONTINUED | OUTPATIENT
Start: 2021-07-15 | End: 2021-07-16 | Stop reason: HOSPADM

## 2021-07-14 RX ORDER — CLOPIDOGREL BISULFATE 75 MG/1
75 TABLET ORAL DAILY
Status: DISCONTINUED | OUTPATIENT
Start: 2021-07-15 | End: 2021-07-16 | Stop reason: HOSPADM

## 2021-07-14 RX ORDER — SODIUM CHLORIDE 9 MG/ML
3 INJECTION, SOLUTION INTRAVENOUS CONTINUOUS
Status: DISCONTINUED | OUTPATIENT
Start: 2021-07-14 | End: 2021-07-15

## 2021-07-14 RX ORDER — MIDAZOLAM HYDROCHLORIDE 2 MG/2ML
INJECTION, SOLUTION INTRAMUSCULAR; INTRAVENOUS AS NEEDED
Status: DISCONTINUED | OUTPATIENT
Start: 2021-07-14 | End: 2021-07-14 | Stop reason: HOSPADM

## 2021-07-14 RX ORDER — CLOPIDOGREL BISULFATE 75 MG/1
TABLET ORAL AS NEEDED
Status: DISCONTINUED | OUTPATIENT
Start: 2021-07-14 | End: 2021-07-14 | Stop reason: HOSPADM

## 2021-07-14 RX ORDER — FENTANYL CITRATE 50 UG/ML
INJECTION, SOLUTION INTRAMUSCULAR; INTRAVENOUS AS NEEDED
Status: DISCONTINUED | OUTPATIENT
Start: 2021-07-14 | End: 2021-07-14 | Stop reason: HOSPADM

## 2021-07-14 RX ADMIN — SODIUM CHLORIDE 1000 ML: 9 INJECTION, SOLUTION INTRAVENOUS at 21:35

## 2021-07-14 RX ADMIN — LORAZEPAM 2 MG: 2 INJECTION INTRAMUSCULAR; INTRAVENOUS at 20:14

## 2021-07-14 RX ADMIN — ONDANSETRON 4 MG: 2 INJECTION INTRAMUSCULAR; INTRAVENOUS at 20:14

## 2021-07-14 RX ADMIN — SODIUM CHLORIDE 1000 ML: 9 INJECTION, SOLUTION INTRAVENOUS at 20:14

## 2021-07-14 NOTE — ED PROVIDER NOTES
Subjective   40-year-old male presenting with several complaints.  He states it is prior to arrival he heard a ringing noise in his ears, felt like he was going to have a seizure so called EMS.  He also notes that he has had some nausea and vomiting.  He arrives here diaphoretic.  He notes that a couple months ago he had a similar episode where he had ringing in his ear and in the neck thing he knew he was shaking on the ground.  He denies any fevers, chills, abdominal pain, chest pain.  He does have a history of previous CVA.  He does use marijuana.          Review of Systems   Constitutional: Positive for diaphoresis.   HENT: Negative.    Eyes: Negative.    Respiratory: Negative.    Cardiovascular: Negative.    Gastrointestinal: Positive for nausea and vomiting. Negative for abdominal pain.   Genitourinary: Negative.    Musculoskeletal: Negative.    Skin: Negative.    Neurological: Positive for seizures.   Psychiatric/Behavioral: Negative.        Past Medical History:   Diagnosis Date   • Anxiety    • Depression    • Glaucoma    • Hypertension    • Screening for neurological condition    • Stroke (CMS/[facility])        No Known Allergies    Past Surgical History:   Procedure Laterality Date   • FRACTURE SURGERY Right     ankle       Family History   Problem Relation Age of Onset   • Dementia Maternal Grandmother        Social History     Socioeconomic History   • Marital status: Single     Spouse name: Not on file   • Number of children: Not on file   • Years of education: Not on file   • Highest education level: Not on file   Tobacco Use   • Smoking status: Never Smoker   • Smokeless tobacco: Never Used   Substance and Sexual Activity   • Alcohol use: Yes     Comment: OCC   • Drug use: Yes     Types: Marijuana     Comment: RARELY   • Sexual activity: Defer           Objective   Physical Exam  Vitals reviewed.   Constitutional:       General: He is not in acute distress.     Appearance: Normal appearance. He is not  ill-appearing or toxic-appearing.      Comments: Diaphoretic   HENT:      Head: Normocephalic and atraumatic.      Right Ear: External ear normal.      Left Ear: External ear normal.      Nose: Nose normal.      Mouth/Throat:      Mouth: Mucous membranes are moist.      Pharynx: Oropharynx is clear.   Eyes:      Extraocular Movements: Extraocular movements intact.      Conjunctiva/sclera: Conjunctivae normal.      Comments: Pupils are mydriatic   Cardiovascular:      Rate and Rhythm: Regular rhythm.      Pulses: Normal pulses.      Heart sounds: Normal heart sounds.      Comments: Mild tachycardia  Pulmonary:      Effort: Pulmonary effort is normal. No respiratory distress.      Breath sounds: Normal breath sounds.   Abdominal:      General: Bowel sounds are normal. There is no distension.      Tenderness: There is no abdominal tenderness.   Musculoskeletal:         General: No swelling, tenderness or deformity. Normal range of motion.      Cervical back: Normal range of motion and neck supple.   Skin:     General: Skin is warm.      Capillary Refill: Capillary refill takes less than 2 seconds.      Findings: No rash.   Neurological:      General: No focal deficit present.      Mental Status: He is alert and oriented to person, place, and time.      Comments: Cranial nerves intact, normal strength and sensation   Psychiatric:         Mood and Affect: Mood normal.         Behavior: Behavior normal.         Procedures           ED Course                                           MDM  Number of Diagnoses or Management Options  Acute renal insufficiency  ST elevation myocardial infarction (STEMI), unspecified artery (CMS/HCC)  Diagnosis management comments: 40-year-old male feeling like he is going to have a seizure.  Well-developed, well-nourished man in no distress with exam as above.  His presenting exam is consistent with a sympathomimetic intoxication, he has dilated pupils and is very sweaty and tachycardic.  Some  "aspects of his history do sound consistent with seizure.  Will obtain labs, EKG and head CT.  Will give symptomatic treatment.  Disposition pending.    DDx: Seizure, intoxication, electrolyte abnormality    EKG interpreted by me: Sinus rhythm, normal rate, some nonspecific ST elevations without reciprocal ST depressions, this is an abnormal EKG, no previous for comparison    Labs resulted with a leukocytosis and acute renal insufficiency.  I suspect he is dehydrated.  His troponin was elevated.  I went in to discuss again, he had initially told myself and nursing that he had not had any chest pain.  He now reports that he may have had some \"heartburn\".  He is resting comfortably at this time.  I discussed case Dr. Grady, decision was made to repeat EKG, repeat EKG does show some more concerning findings with significant ST elevation and some new ST depressions.  Decision was made to activate the Cath Lab.  Plan of care discussed with patient and significant other, they are in agreement.    Repeat EKG interpreted by me: Sinus rhythm, normal rate, significant ST elevations in the inferior leads and anterior leads, there is also reciprocal ST depressions in lead I and aVL, this is an abnormal EKG    30 minutes of critical care provided. This time excludes other billable procedures. Time does include preparation of documents, medical consultations, review of old records, and direct bedside care. Patient was at high risk for life-threatening deterioration due to STEMI.          Amount and/or Complexity of Data Reviewed  Decide to obtain previous medical records or to obtain history from someone other than the patient: yes    Critical Care  Total time providing critical care: 30-74 minutes      Final diagnoses:   ST elevation myocardial infarction (STEMI), unspecified artery (CMS/[facility])   Acute renal insufficiency          Jayy Tyler MD  07/14/21 2117    "

## 2021-07-15 ENCOUNTER — APPOINTMENT (OUTPATIENT)
Dept: CARDIOLOGY | Facility: HOSPITAL | Age: 41
End: 2021-07-15

## 2021-07-15 PROBLEM — N17.9 ACUTE RENAL FAILURE (ARF): Status: ACTIVE | Noted: 2021-07-15

## 2021-07-15 LAB
AMPHET+METHAMPHET UR QL: NEGATIVE
AMPHETAMINES UR QL: NEGATIVE
ANION GAP SERPL CALCULATED.3IONS-SCNC: 13 MMOL/L (ref 5–15)
ATMOSPHERIC PRESS: 737 MMHG
B-OH-BUTYR SERPL-SCNC: 0.53 MMOL/L (ref 0.02–0.27)
BARBITURATES UR QL SCN: NEGATIVE
BASE EXCESS BLDV CALC-SCNC: 3.7 MMOL/L (ref 0–2)
BDY SITE: ABNORMAL
BENZODIAZ UR QL SCN: NEGATIVE
BH CV ECHO MEAS - % IVS THICK: 31.9 %
BH CV ECHO MEAS - % LVPW THICK: 24.8 %
BH CV ECHO MEAS - AO MAX PG (FULL): 1.7 MMHG
BH CV ECHO MEAS - AO MAX PG: 4 MMHG
BH CV ECHO MEAS - AO MEAN PG (FULL): 1 MMHG
BH CV ECHO MEAS - AO MEAN PG: 2 MMHG
BH CV ECHO MEAS - AO ROOT AREA (BSA CORRECTED): 1.6
BH CV ECHO MEAS - AO ROOT AREA: 8.2 CM^2
BH CV ECHO MEAS - AO ROOT DIAM: 3.2 CM
BH CV ECHO MEAS - AO V2 MAX: 97.6 CM/SEC
BH CV ECHO MEAS - AO V2 MEAN: 65.6 CM/SEC
BH CV ECHO MEAS - AO V2 VTI: 17.9 CM
BH CV ECHO MEAS - AVA(I,A): 3.8 CM^2
BH CV ECHO MEAS - AVA(I,D): 3.8 CM^2
BH CV ECHO MEAS - AVA(V,A): 3.5 CM^2
BH CV ECHO MEAS - AVA(V,D): 3.5 CM^2
BH CV ECHO MEAS - BSA(HAYCOCK): 2.1 M^2
BH CV ECHO MEAS - BSA: 2 M^2
BH CV ECHO MEAS - BZI_BMI: 28.6 KILOGRAMS/M^2
BH CV ECHO MEAS - BZI_METRIC_HEIGHT: 175.3 CM
BH CV ECHO MEAS - BZI_METRIC_WEIGHT: 88 KG
BH CV ECHO MEAS - EDV(CUBED): 60.7 ML
BH CV ECHO MEAS - EDV(MOD-SP2): 119 ML
BH CV ECHO MEAS - EDV(MOD-SP4): 110 ML
BH CV ECHO MEAS - EDV(TEICH): 67.1 ML
BH CV ECHO MEAS - EF(CUBED): 70 %
BH CV ECHO MEAS - EF(MOD-BP): 48 %
BH CV ECHO MEAS - EF(MOD-SP2): 47.3 %
BH CV ECHO MEAS - EF(MOD-SP4): 42.5 %
BH CV ECHO MEAS - EF(TEICH): 62.3 %
BH CV ECHO MEAS - ESV(CUBED): 18.2 ML
BH CV ECHO MEAS - ESV(MOD-SP2): 62.7 ML
BH CV ECHO MEAS - ESV(MOD-SP4): 63.2 ML
BH CV ECHO MEAS - ESV(TEICH): 25.3 ML
BH CV ECHO MEAS - FS: 33.1 %
BH CV ECHO MEAS - IVS/LVPW: 0.9
BH CV ECHO MEAS - IVSD: 1.4 CM
BH CV ECHO MEAS - IVSS: 1.9 CM
BH CV ECHO MEAS - LA DIMENSION: 3.4 CM
BH CV ECHO MEAS - LA/AO: 1.1
BH CV ECHO MEAS - LAD MAJOR: 5.6 CM
BH CV ECHO MEAS - LAT PEAK E' VEL: 8.8 CM/SEC
BH CV ECHO MEAS - LATERAL E/E' RATIO: 9.1
BH CV ECHO MEAS - LV DIASTOLIC VOL/BSA (35-75): 53.9 ML/M^2
BH CV ECHO MEAS - LV MASS(C)D: 224.6 GRAMS
BH CV ECHO MEAS - LV MASS(C)DI: 110.2 GRAMS/M^2
BH CV ECHO MEAS - LV MASS(C)S: 208.7 GRAMS
BH CV ECHO MEAS - LV MASS(C)SI: 102.4 GRAMS/M^2
BH CV ECHO MEAS - LV MAX PG: 2.3 MMHG
BH CV ECHO MEAS - LV MEAN PG: 1 MMHG
BH CV ECHO MEAS - LV SYSTOLIC VOL/BSA (12-30): 31 ML/M^2
BH CV ECHO MEAS - LV V1 MAX: 76.3 CM/SEC
BH CV ECHO MEAS - LV V1 MEAN: 49.7 CM/SEC
BH CV ECHO MEAS - LV V1 VTI: 15.2 CM
BH CV ECHO MEAS - LVIDD: 3.9 CM
BH CV ECHO MEAS - LVIDS: 2.6 CM
BH CV ECHO MEAS - LVLD AP2: 9.1 CM
BH CV ECHO MEAS - LVLD AP4: 9.5 CM
BH CV ECHO MEAS - LVLS AP2: 7.1 CM
BH CV ECHO MEAS - LVLS AP4: 7.8 CM
BH CV ECHO MEAS - LVOT AREA (M): 4.5 CM^2
BH CV ECHO MEAS - LVOT AREA: 4.4 CM^2
BH CV ECHO MEAS - LVOT DIAM: 2.4 CM
BH CV ECHO MEAS - LVPWD: 1.6 CM
BH CV ECHO MEAS - LVPWS: 2 CM
BH CV ECHO MEAS - MED PEAK E' VEL: 6.2 CM/SEC
BH CV ECHO MEAS - MEDIAL E/E' RATIO: 12.9
BH CV ECHO MEAS - MV A MAX VEL: 72.2 CM/SEC
BH CV ECHO MEAS - MV DEC TIME: 0.2 SEC
BH CV ECHO MEAS - MV E MAX VEL: 80.2 CM/SEC
BH CV ECHO MEAS - MV E/A: 1.1
BH CV ECHO MEAS - MV MAX PG: 2.5 MMHG
BH CV ECHO MEAS - MV MEAN PG: 1 MMHG
BH CV ECHO MEAS - MV V2 MAX: 78.9 CM/SEC
BH CV ECHO MEAS - MV V2 MEAN: 49.8 CM/SEC
BH CV ECHO MEAS - MV V2 VTI: 18.5 CM
BH CV ECHO MEAS - MVA(VTI): 3.7 CM^2
BH CV ECHO MEAS - PA ACC TIME: 0.09 SEC
BH CV ECHO MEAS - PA MAX PG (FULL): 0.92 MMHG
BH CV ECHO MEAS - PA MAX PG: 1.8 MMHG
BH CV ECHO MEAS - PA MEAN PG (FULL): 1 MMHG
BH CV ECHO MEAS - PA MEAN PG: 1 MMHG
BH CV ECHO MEAS - PA PR(ACCEL): 37.6 MMHG
BH CV ECHO MEAS - PA V2 MAX: 67.4 CM/SEC
BH CV ECHO MEAS - PA V2 MEAN: 44.3 CM/SEC
BH CV ECHO MEAS - PA V2 VTI: 13.2 CM
BH CV ECHO MEAS - PULM A REVS DUR: 0.1 SEC
BH CV ECHO MEAS - PULM A REVS VEL: 21.9 CM/SEC
BH CV ECHO MEAS - PULM DIAS VEL: 28.1 CM/SEC
BH CV ECHO MEAS - PULM S/D: 1.3
BH CV ECHO MEAS - PULM SYS VEL: 37.1 CM/SEC
BH CV ECHO MEAS - RAP SYSTOLE: 3 MMHG
BH CV ECHO MEAS - RV MAX PG: 0.89 MMHG
BH CV ECHO MEAS - RV MEAN PG: 0 MMHG
BH CV ECHO MEAS - RV V1 MAX: 47.3 CM/SEC
BH CV ECHO MEAS - RV V1 MEAN: 31.6 CM/SEC
BH CV ECHO MEAS - RV V1 VTI: 9 CM
BH CV ECHO MEAS - RVSP: 14 MMHG
BH CV ECHO MEAS - SI(AO): 72.4 ML/M^2
BH CV ECHO MEAS - SI(CUBED): 20.8 ML/M^2
BH CV ECHO MEAS - SI(LVOT): 33.2 ML/M^2
BH CV ECHO MEAS - SI(MOD-SP2): 27.6 ML/M^2
BH CV ECHO MEAS - SI(MOD-SP4): 23 ML/M^2
BH CV ECHO MEAS - SI(TEICH): 20.5 ML/M^2
BH CV ECHO MEAS - SV(AO): 147.6 ML
BH CV ECHO MEAS - SV(CUBED): 42.5 ML
BH CV ECHO MEAS - SV(LVOT): 67.6 ML
BH CV ECHO MEAS - SV(MOD-SP2): 56.3 ML
BH CV ECHO MEAS - SV(MOD-SP4): 46.8 ML
BH CV ECHO MEAS - SV(TEICH): 41.8 ML
BH CV ECHO MEAS - TR MAX PG: 11 MMHG
BH CV ECHO MEAS - TR MAX VEL: 164 CM/SEC
BH CV ECHO MEASUREMENTS AVERAGE E/E' RATIO: 10.69
BH CV XLRA - RV BASE: 3.7 CM
BH CV XLRA - RV LENGTH: 7.5 CM
BH CV XLRA - RV MID: 2.4 CM
BH CV XLRA - TDI S': 9.8 CM/SEC
BUN SERPL-MCNC: 8 MG/DL (ref 6–20)
BUN/CREAT SERPL: 6.3 (ref 7–25)
BUPRENORPHINE SERPL-MCNC: NEGATIVE NG/ML
CALCIUM SPEC-SCNC: 7.9 MG/DL (ref 8.6–10.5)
CANNABINOIDS SERPL QL: POSITIVE
CHLORIDE SERPL-SCNC: 99 MMOL/L (ref 98–107)
CHOLEST SERPL-MCNC: 259 MG/DL (ref 0–200)
CO2 SERPL-SCNC: 24 MMOL/L (ref 22–29)
COCAINE UR QL: NEGATIVE
COHGB MFR BLD: 1.2 % (ref 0–5)
CREAT SERPL-MCNC: 1.26 MG/DL (ref 0.76–1.27)
D-LACTATE SERPL-SCNC: 1.3 MMOL/L (ref 0.5–2)
DEPRECATED RDW RBC AUTO: 47.1 FL (ref 37–54)
ERYTHROCYTE [DISTWIDTH] IN BLOOD BY AUTOMATED COUNT: 15.1 % (ref 12.3–15.4)
GFR SERPL CREATININE-BSD FRML MDRD: 64 ML/MIN/1.73
GLUCOSE SERPL-MCNC: 103 MG/DL (ref 65–99)
HBA1C MFR BLD: 4.6 % (ref 4.8–5.6)
HCO3 BLDV-SCNC: 28 MMOL/L (ref 22–28)
HCT VFR BLD AUTO: 50.7 % (ref 37.5–51)
HDLC SERPL-MCNC: 21 MG/DL (ref 40–60)
HGB BLD-MCNC: 17.6 G/DL (ref 13–17.7)
INHALED O2 CONCENTRATION: 21 %
LDLC SERPL CALC-MCNC: 219 MG/DL (ref 0–100)
LDLC/HDLC SERPL: 10.34 {RATIO}
LEFT ATRIUM VOLUME INDEX: 18.3 ML/M^2
LEFT ATRIUM VOLUME: 37.4 ML
LV EF 2D ECHO EST: 55 %
Lab: ABNORMAL
MAGNESIUM SERPL-MCNC: 2.3 MG/DL (ref 1.6–2.6)
MAXIMAL PREDICTED HEART RATE: 182 BPM
MCH RBC QN AUTO: 30.1 PG (ref 26.6–33)
MCHC RBC AUTO-ENTMCNC: 34.7 G/DL (ref 31.5–35.7)
MCV RBC AUTO: 86.8 FL (ref 79–97)
METHADONE UR QL SCN: NEGATIVE
METHGB BLD QL: 0.1 % (ref 0–3)
MODALITY: ABNORMAL
NOTE: ABNORMAL
OPIATES UR QL: NEGATIVE
OSMOLALITY SERPL: 290 MOSM/KG (ref 275–300)
OXYCODONE UR QL SCN: NEGATIVE
OXYHGB MFR BLDV: 93 % (ref 40–70)
PCO2 BLDV: 40.1 MM HG (ref 40–50)
PCP UR QL SCN: NEGATIVE
PH BLDV: 7.45 PH UNITS (ref 7.32–7.42)
PLATELET # BLD AUTO: 384 10*3/MM3 (ref 140–450)
PMV BLD AUTO: 10 FL (ref 6–12)
PO2 BLDV: 73.1 MM HG (ref 30–50)
POTASSIUM SERPL-SCNC: 4.3 MMOL/L (ref 3.5–5.2)
PROCALCITONIN SERPL-MCNC: 0.04 NG/ML (ref 0–0.25)
PROPOXYPH UR QL: NEGATIVE
RBC # BLD AUTO: 5.84 10*6/MM3 (ref 4.14–5.8)
SALICYLATES SERPL-MCNC: <0.3 MG/DL
SAO2 % BLDCOV: 94.2 % (ref 45–75)
SARS-COV-2 RNA PNL SPEC NAA+PROBE: NOT DETECTED
SODIUM SERPL-SCNC: 136 MMOL/L (ref 136–145)
STRESS TARGET HR: 155 BPM
TRICYCLICS UR QL SCN: NEGATIVE
TRIGL SERPL-MCNC: 104 MG/DL (ref 0–150)
TSH SERPL DL<=0.05 MIU/L-ACNC: 4.49 UIU/ML (ref 0.27–4.2)
VENTILATOR MODE: ABNORMAL
VLDLC SERPL-MCNC: 19 MG/DL (ref 5–40)
WBC # BLD AUTO: 27.73 10*3/MM3 (ref 3.4–10.8)

## 2021-07-15 PROCEDURE — 84443 ASSAY THYROID STIM HORMONE: CPT | Performed by: INTERNAL MEDICINE

## 2021-07-15 PROCEDURE — 83930 ASSAY OF BLOOD OSMOLALITY: CPT | Performed by: EMERGENCY MEDICINE

## 2021-07-15 PROCEDURE — 84145 PROCALCITONIN (PCT): CPT | Performed by: EMERGENCY MEDICINE

## 2021-07-15 PROCEDURE — 80061 LIPID PANEL: CPT | Performed by: EMERGENCY MEDICINE

## 2021-07-15 PROCEDURE — 83605 ASSAY OF LACTIC ACID: CPT | Performed by: EMERGENCY MEDICINE

## 2021-07-15 PROCEDURE — 93010 ELECTROCARDIOGRAM REPORT: CPT | Performed by: INTERNAL MEDICINE

## 2021-07-15 PROCEDURE — 25010000002 ONDANSETRON PER 1 MG: Performed by: INTERNAL MEDICINE

## 2021-07-15 PROCEDURE — 82010 KETONE BODYS QUAN: CPT | Performed by: EMERGENCY MEDICINE

## 2021-07-15 PROCEDURE — 94799 UNLISTED PULMONARY SVC/PX: CPT

## 2021-07-15 PROCEDURE — 93306 TTE W/DOPPLER COMPLETE: CPT | Performed by: INTERNAL MEDICINE

## 2021-07-15 PROCEDURE — 93306 TTE W/DOPPLER COMPLETE: CPT

## 2021-07-15 PROCEDURE — 83036 HEMOGLOBIN GLYCOSYLATED A1C: CPT | Performed by: EMERGENCY MEDICINE

## 2021-07-15 PROCEDURE — 85027 COMPLETE CBC AUTOMATED: CPT | Performed by: EMERGENCY MEDICINE

## 2021-07-15 PROCEDURE — 80048 BASIC METABOLIC PNL TOTAL CA: CPT | Performed by: EMERGENCY MEDICINE

## 2021-07-15 PROCEDURE — 83735 ASSAY OF MAGNESIUM: CPT | Performed by: EMERGENCY MEDICINE

## 2021-07-15 PROCEDURE — 82805 BLOOD GASES W/O2 SATURATION: CPT

## 2021-07-15 PROCEDURE — 82820 HEMOGLOBIN-OXYGEN AFFINITY: CPT

## 2021-07-15 PROCEDURE — 93005 ELECTROCARDIOGRAM TRACING: CPT | Performed by: INTERNAL MEDICINE

## 2021-07-15 PROCEDURE — 99222 1ST HOSP IP/OBS MODERATE 55: CPT | Performed by: EMERGENCY MEDICINE

## 2021-07-15 RX ORDER — CHOLECALCIFEROL (VITAMIN D3) 125 MCG
5 CAPSULE ORAL NIGHTLY PRN
Status: DISCONTINUED | OUTPATIENT
Start: 2021-07-15 | End: 2021-07-16 | Stop reason: HOSPADM

## 2021-07-15 RX ORDER — LABETALOL HYDROCHLORIDE 5 MG/ML
10 INJECTION, SOLUTION INTRAVENOUS EVERY 4 HOURS PRN
Status: DISCONTINUED | OUTPATIENT
Start: 2021-07-15 | End: 2021-07-16 | Stop reason: HOSPADM

## 2021-07-15 RX ORDER — FAMOTIDINE 10 MG/ML
20 INJECTION, SOLUTION INTRAVENOUS EVERY 12 HOURS SCHEDULED
Status: DISCONTINUED | OUTPATIENT
Start: 2021-07-15 | End: 2021-07-15

## 2021-07-15 RX ORDER — CITALOPRAM 20 MG/1
20 TABLET ORAL DAILY
Status: DISCONTINUED | OUTPATIENT
Start: 2021-07-15 | End: 2021-07-16 | Stop reason: HOSPADM

## 2021-07-15 RX ORDER — FAMOTIDINE 20 MG/1
20 TABLET, FILM COATED ORAL
Status: DISCONTINUED | OUTPATIENT
Start: 2021-07-15 | End: 2021-07-16 | Stop reason: HOSPADM

## 2021-07-15 RX ORDER — SODIUM BICARBONATE 650 MG/1
1300 TABLET ORAL 4 TIMES DAILY
Status: DISCONTINUED | OUTPATIENT
Start: 2021-07-15 | End: 2021-07-15

## 2021-07-15 RX ORDER — NITROGLYCERIN 20 MG/100ML
5-200 INJECTION INTRAVENOUS
Status: DISCONTINUED | OUTPATIENT
Start: 2021-07-15 | End: 2021-07-16 | Stop reason: HOSPADM

## 2021-07-15 RX ORDER — NITROGLYCERIN 20 MG/100ML
INJECTION INTRAVENOUS
Status: COMPLETED
Start: 2021-07-15 | End: 2021-07-15

## 2021-07-15 RX ORDER — ONDANSETRON 2 MG/ML
4 INJECTION INTRAMUSCULAR; INTRAVENOUS EVERY 6 HOURS PRN
Status: DISCONTINUED | OUTPATIENT
Start: 2021-07-15 | End: 2021-07-16 | Stop reason: HOSPADM

## 2021-07-15 RX ORDER — SODIUM CHLORIDE, SODIUM LACTATE, POTASSIUM CHLORIDE, CALCIUM CHLORIDE 600; 310; 30; 20 MG/100ML; MG/100ML; MG/100ML; MG/100ML
150 INJECTION, SOLUTION INTRAVENOUS CONTINUOUS
Status: DISCONTINUED | OUTPATIENT
Start: 2021-07-15 | End: 2021-07-16

## 2021-07-15 RX ORDER — CLONAZEPAM 0.5 MG/1
0.5 TABLET ORAL 2 TIMES DAILY PRN
Status: DISCONTINUED | OUTPATIENT
Start: 2021-07-15 | End: 2021-07-16 | Stop reason: HOSPADM

## 2021-07-15 RX ADMIN — NITROGLYCERIN 5 MCG/MIN: 20 INJECTION INTRAVENOUS at 01:15

## 2021-07-15 RX ADMIN — SODIUM CHLORIDE, POTASSIUM CHLORIDE, SODIUM LACTATE AND CALCIUM CHLORIDE 150 ML/HR: 600; 310; 30; 20 INJECTION, SOLUTION INTRAVENOUS at 07:07

## 2021-07-15 RX ADMIN — FAMOTIDINE 20 MG: 10 INJECTION INTRAVENOUS at 08:02

## 2021-07-15 RX ADMIN — CLONAZEPAM 0.5 MG: 0.5 TABLET ORAL at 17:01

## 2021-07-15 RX ADMIN — ASPIRIN 81 MG: 81 TABLET, COATED ORAL at 08:01

## 2021-07-15 RX ADMIN — SODIUM BICARBONATE 650 MG TABLET 1300 MG: at 00:41

## 2021-07-15 RX ADMIN — FAMOTIDINE 20 MG: 20 TABLET ORAL at 16:38

## 2021-07-15 RX ADMIN — CITALOPRAM HYDROBROMIDE 20 MG: 20 TABLET ORAL at 08:01

## 2021-07-15 RX ADMIN — METOPROLOL TARTRATE 25 MG: 25 TABLET, FILM COATED ORAL at 01:30

## 2021-07-15 RX ADMIN — SODIUM CHLORIDE, POTASSIUM CHLORIDE, SODIUM LACTATE AND CALCIUM CHLORIDE 150 ML/HR: 600; 310; 30; 20 INJECTION, SOLUTION INTRAVENOUS at 23:56

## 2021-07-15 RX ADMIN — ATORVASTATIN CALCIUM 80 MG: 80 TABLET, FILM COATED ORAL at 20:56

## 2021-07-15 RX ADMIN — METOPROLOL TARTRATE 25 MG: 25 TABLET, FILM COATED ORAL at 20:56

## 2021-07-15 RX ADMIN — CLOPIDOGREL BISULFATE 75 MG: 75 TABLET ORAL at 08:02

## 2021-07-15 RX ADMIN — SODIUM CHLORIDE, POTASSIUM CHLORIDE, SODIUM LACTATE AND CALCIUM CHLORIDE 150 ML/HR: 600; 310; 30; 20 INJECTION, SOLUTION INTRAVENOUS at 00:41

## 2021-07-15 RX ADMIN — METOPROLOL TARTRATE 25 MG: 25 TABLET, FILM COATED ORAL at 08:02

## 2021-07-15 RX ADMIN — LABETALOL 20 MG/4 ML (5 MG/ML) INTRAVENOUS SYRINGE 10 MG: at 18:20

## 2021-07-15 RX ADMIN — ONDANSETRON 4 MG: 2 INJECTION INTRAMUSCULAR; INTRAVENOUS at 23:56

## 2021-07-15 RX ADMIN — SODIUM CHLORIDE 3 ML/KG/HR: 9 INJECTION, SOLUTION INTRAVENOUS at 00:14

## 2021-07-15 RX ADMIN — FAMOTIDINE 20 MG: 10 INJECTION INTRAVENOUS at 01:29

## 2021-07-15 RX ADMIN — SODIUM CHLORIDE, POTASSIUM CHLORIDE, SODIUM LACTATE AND CALCIUM CHLORIDE 150 ML/HR: 600; 310; 30; 20 INJECTION, SOLUTION INTRAVENOUS at 15:23

## 2021-07-15 NOTE — PROGRESS NOTES
[facility]    Name:  Girma Chapman   Age:  40 y.o.  Sex:  male  :  1980  MRN:  [id number]   Visit Number:  [id number]  Admission Date:  2021  Date Of Service:  07/15/21  Primary Care Physician:  Girma Arambula,      LOS: 1 day :    Chief Complaint:      Follow-up of STEMI.    Subjective:    Mr. Chapman was seen and examined this morning.  He is currently lying down on the bed and is comfortable at rest.  He is currently on nitroglycerin drip.  He does have history of CVA and was admitted with vague chest pains and tingling in his ears.  Patient was noted to have late presentation of STEMI and was taken to the Cath Lab by Dr. Grady yesterday with placement of RCA stent.  Patient states that he has strong family history of coronary artery disease in multiple young family members.  He denies using cocaine.  His urine tox screen was positive for THC.  Previous physician documentation, laboratory and imaging data have been reviewed.    Review of Systems:     All systems were reviewed and negative except as mentioned in subjective, assessment and plan.    Vital Signs:    Temp:  [97.5 °F (36.4 °C)-98.5 °F (36.9 °C)] 98.4 °F (36.9 °C)  Heart Rate:  [] 80  Resp:  [14-20] 16  BP: (114-188)/() 130/98    Intake and output:    I/O last 3 completed shifts:  In: 1143 [P.O.:115; I.V.:1028]  Out: 1150 [Urine:1150]  I/O this shift:  In: 230 [P.O.:230]  Out: 175 [Urine:175]    Physical Examination:    General Appearance:  Alert and cooperative.   Head:  Atraumatic and normocephalic.   Eyes: Conjunctivae and sclerae normal, no icterus. No pallor.   Throat: No oral lesions, no thrush, oral mucosa moist.   Neck: Supple, trachea midline, no thyromegaly.   Lungs:   Breath sounds heard bilaterally equally.  No crackles or wheezing. No Pleural rub or bronchial breathing.   Heart:  Normal S1 and S2, no murmur, no gallop, no rub. No JVD.   Abdomen:   Normal bowel  sounds, no masses, no organomegaly. Soft, nontender, nondistended, no rebound tenderness.   Extremities: Supple, no edema, no cyanosis, no clubbing.   Skin: No bleeding or rash.  Multiple tattoos are noted throughout the body.   Neurologic: Alert and oriented x 3. No facial asymmetry. Moves all four limbs. No tremors.      Laboratory results:    Results from last 7 days   Lab Units 07/15/21  0419 07/14/21  2011 07/12/21  0950   SODIUM mmol/L 136 136 136   POTASSIUM mmol/L 4.3 3.7 5.0   CHLORIDE mmol/L 99 90* 97*   CO2 mmol/L 24.0 7.2* 24.5   BUN mg/dL 8 10 7   CREATININE mg/dL 1.26 1.72* 1.37*   CALCIUM mg/dL 7.9* 9.9 9.4   BILIRUBIN mg/dL  --  0.7 0.7   ALK PHOS U/L  --  78 57   ALT (SGPT) U/L  --  31 26   AST (SGOT) U/L  --  58* 32   GLUCOSE mg/dL 103* 126* 105*     Results from last 7 days   Lab Units 07/15/21  0419 07/14/21  2011 07/12/21  0950   WBC 10*3/mm3 27.73* 24.03* 12.36*   HEMOGLOBIN g/dL 17.6 19.4* 19.1*   HEMATOCRIT % 50.7 60.5* 57.1*   PLATELETS 10*3/mm3 384 487* 413         Results from last 7 days   Lab Units 07/14/21 2011   TROPONIN T ng/mL 0.213*     I have reviewed the patient's laboratory results.    Radiology results:    Adult Transthoracic Echo Complete W/ Cont if Necessary Per Protocol    Result Date: 7/15/2021  · Left ventricular wall thickness is consistent with mild to moderate concentric hypertrophy. · Estimated left ventricular EF = 55% Left ventricular ejection fraction appears to be 51 - 55%. Left ventricular systolic function is normal. · Left ventricular diastolic function was normal. · Estimated right ventricular systolic pressure from tricuspid regurgitation is normal (<35 mmHg).      I have reviewed the patient's radiology reports.    Medication Review:     I have reviewed the patient's active and prn medications.     Problem List:      ST elevation myocardial infarction (STEMI) (CMS/[facility])    Acute renal failure (ARF) (CMS/[facility])    Assessment:    1. Acute inferior wall STEMI,  POA.  2. Acute renal failure, POA, improving.  3. Leukocytosis likely related to myocardial infarction, POA.  4. History of CVA.    Plan:    Mr. Chapman is currently hemodynamically stable and is on IV fluids.  He has been started on dual antiplatelet agents with aspirin and Plavix as well as atorvastatin.  His renal function has improved.  His leukocytosis is likely secondary to myocardial infarction.  At this time I do not see any evidence of infection.  He does not need antibiotics at this time.  2D echocardiogram is fairly within normal range.  He will be transferred out to the medical floor with telemetry and may be able to go home tomorrow.  He will need outpatient follow-up with Dr. Grady.    DVT Prophylaxis: SCDs  Code Status: Full  Diet: Cardiac    Bryant Davis MD  07/15/21  12:36 EDT    Dictated utilizing Dragon dictation.

## 2021-07-15 NOTE — PAYOR COMM NOTE
"TO:[facility]  FROM:ISAÍAS ROBLES, RN PHONE [phone]  INPT NOTIFICATION AND CLINICALS    Ami Dixon (40 y.o. Male)     Date of Birth Social Security Number Address Home Phone MRN    1980  [address] [facility]  [address] [address] [phone] [id number]    Hinduism Marital Status          None Single       Admission Date Admission Type Admitting Provider Attending Provider Department, Room/Bed    7/14/21 Emergency Farhad Grady MD Cook, Bryon Scott, MD [facility] INTENSIVE CARE, I05/1    Discharge Date Discharge Disposition Discharge Destination                       Attending Provider: Farhad Grady MD    Allergies: No Known Allergies    Isolation: None   Infection: None   Code Status: CPR    Ht: 175.3 cm (69\")   Wt: 88.2 kg (194 lb 6.4 oz)    Admission Cmt: None   Principal Problem: None                Active Insurance as of 7/14/2021     Primary Coverage     Payor Plan Insurance Group Employer/Plan Group    [employer] [place] [employer]      Payor Plan Address Payor Plan Phone Number Payor Plan Fax Number Effective Dates    [address] [address] [phone]  7/12/2019 - None Entered    [facility] [id number]       Subscriber Name Subscriber Birth Date Member ID       AMI DIXON 1980 [id number]                 Emergency Contacts      (Rel.) Home Phone Work Phone Mobile Phone    Robina Cifuentes (Mother) [phone] -- [phone]               History & Physical      Farhad Grady MD at 07/14/21 2332               [facility] Cardiology History and Physical    Ami Dixon  1980  [id number]  07/14/21     Chief Complaint: Chest discomfort and nausea    History of Present Illness:   Mr. Ami Dixon is a 40 y.o. male who is being admitted by cardiology following emergent PCI for delayed presentation STEMI.  The patient has a past medical history significant for a possible prior CVA, as well as cannabis use.  He does not have " "any significant past cardiac history or significant family cardiac history.  The patient and his family reports over the past 5 days he has been having \"heartburn\" as well as frequent vomiting as well as diaphoresis.  He did present to the emergency department for evaluation of his symptoms on , however no ECG or troponin levels obtained at that time.  He was separately discharged home with suspected cyclical vomiting secondary to chronic cannabis use.  Upon return home, the patient continued to have episodes of heartburn as well as frequent vomiting.  On the evening of presentation, the patient developed a \"ringing\" in his ears and felt as though he was going to have a seizure.  He subsequently called EMS for evaluation and was transported to the emergency department.  While the emergency department, the patient initially denied chest pain, however on further questioning did report his history of mild chest discomfort of the past several days.  An initial EKG showed ST elevation in the inferior leads associated with Q waves, as well as hyperacute T waves.  Initial troponin was found to be mildly elevated at 0.213.  Other labs were significant for acute kidney injury with a creatinine elevated at 1.72, and he had a leukocytosis with a WBC of 24.  A repeat ECG in the emergency department showed persistent Q waves in the inferior leads, however he had worsening ST elevation as well as developing reciprocal changes concerning for acute ischemia.  Given mild chest discomfort in the emergency department and dynamic ST changes on ECG, a code STEMI was activated and he was taken to the Cath Lab for emergent coronary angiography with online intervention as indicated.    Prior Cardiac Testin. Last Coronary Angio: None  2. Prior Stress Testing: None  3. Last Echo: None  4. Prior Holter Monitor: None    Review of Systems:   Review of Systems   Constitutional: Positive for diaphoresis. Negative for activity change, " appetite change, chills, fatigue, fever, unexpected weight gain and unexpected weight loss.   Eyes: Negative for blurred vision and double vision.   Respiratory: Negative for cough, chest tightness, shortness of breath and wheezing.    Cardiovascular: Positive for chest pain. Negative for palpitations and leg swelling.   Gastrointestinal: Positive for nausea and vomiting. Negative for abdominal pain, anal bleeding, blood in stool and GERD.   Endocrine: Negative for cold intolerance and heat intolerance.   Genitourinary: Negative for hematuria.   Neurological: Negative for dizziness, syncope, weakness and light-headedness.   Hematological: Does not bruise/bleed easily.   Psychiatric/Behavioral: Negative for depressed mood and stress. The patient is not nervous/anxious.        Past Medical History:   Past Medical History:   Diagnosis Date   • Anxiety    • Depression    • Glaucoma    • Hypertension    • Screening for neurological condition    • Stroke (CMS/[facility])        Past Surgical History:   Past Surgical History:   Procedure Laterality Date   • FRACTURE SURGERY Right     ankle       Family History:   Family History   Problem Relation Age of Onset   • Dementia Maternal Grandmother        Social History:   Social History     Socioeconomic History   • Marital status: Single     Spouse name: Not on file   • Number of children: Not on file   • Years of education: Not on file   • Highest education level: Not on file   Tobacco Use   • Smoking status: Never Smoker   • Smokeless tobacco: Never Used   Substance and Sexual Activity   • Alcohol use: Yes     Comment: OCC   • Drug use: Yes     Types: Marijuana     Comment: RARELY   • Sexual activity: Defer       Medications:     Current Facility-Administered Medications:   •  acetaminophen (TYLENOL) tablet 650 mg, 650 mg, Oral, Q4H PRN, Farhad Grady MD  •  [START ON 7/15/2021] aspirin EC tablet 81 mg, 81 mg, Oral, Daily, Farhad Grady MD  •  [START ON 7/15/2021]  "atorvastatin (LIPITOR) tablet 80 mg, 80 mg, Oral, Nightly, Farhad Grady MD  •  [START ON 7/15/2021] clopidogrel (PLAVIX) tablet 75 mg, 75 mg, Oral, Daily, Farhad Grady MD  •  sodium chloride 0.9 % infusion, 3 mL/kg/hr, Intravenous, Continuous, Farhad Grady MD    Allergies:   No Known Allergies    Physical Exam:  Vital Signs:   Vitals:    07/14/21 1928   BP: 156/95   BP Location: Right arm   Patient Position: Sitting   Pulse: 114   Resp: 14   Temp: 97.5 °F (36.4 °C)   TempSrc: Oral   SpO2: 95%   Weight: 93 kg (205 lb)   Height: 175.3 cm (69\")       Physical Exam  Constitutional:       General: He is not in acute distress.     Appearance: Normal appearance. He is well-developed. He is not diaphoretic.   HENT:      Head: Normocephalic and atraumatic.   Eyes:      General: No scleral icterus.     Pupils: Pupils are equal, round, and reactive to light.   Neck:      Trachea: No tracheal deviation.   Cardiovascular:      Rate and Rhythm: Normal rate and regular rhythm.      Heart sounds: Normal heart sounds. No murmur heard.   No friction rub. No gallop.       Comments: Normal JVD.  Pulmonary:      Effort: Pulmonary effort is normal. No respiratory distress.      Breath sounds: Normal breath sounds. No stridor. No wheezing or rales.   Chest:      Chest wall: No tenderness.   Abdominal:      General: Bowel sounds are normal. There is no distension.      Palpations: Abdomen is soft.      Tenderness: There is no abdominal tenderness. There is no guarding or rebound.   Musculoskeletal:         General: No swelling. Normal range of motion.      Cervical back: Neck supple. No tenderness.   Lymphadenopathy:      Cervical: No cervical adenopathy.   Skin:     General: Skin is warm and dry.      Findings: No erythema.   Neurological:      General: No focal deficit present.      Mental Status: He is alert and oriented to person, place, and time.   Psychiatric:         Mood and Affect: Mood normal.         Behavior: " Behavior normal.         Results Review:   Results from last 7 days   Lab Units 07/14/21 2011   SODIUM mmol/L 136   POTASSIUM mmol/L 3.7   CHLORIDE mmol/L 90*   CO2 mmol/L 7.2*   BUN mg/dL 10   CREATININE mg/dL 1.72*   CALCIUM mg/dL 9.9   BILIRUBIN mg/dL 0.7   ALK PHOS U/L 78   ALT (SGPT) U/L 31   AST (SGOT) U/L 58*   GLUCOSE mg/dL 126*     Results from last 7 days   Lab Units 07/14/21 2011   TROPONIN T ng/mL 0.213*     @LABRCNTbnp@  Results from last 7 days   Lab Units 07/14/21 2011 07/12/21  0950   WBC 10*3/mm3 24.03* 12.36*   HEMOGLOBIN g/dL 19.4* 19.1*   HEMATOCRIT % 60.5* 57.1*   PLATELETS 10*3/mm3 487* 413         Results from last 7 days   Lab Units 07/12/21  0950   MAGNESIUM mg/dL 1.4*       I personally viewed and interpreted the patient's EKG/Telemetry data     Assessment / Plan:     1.  Inferior STEMI with delayed presentation  --Several day history of substernal chest discomfort as well as diaphoresis and recurrent nausea, onset approximately 5 days prior to presentation  --ECG on initial presentation showed ST elevation in inferior leads, however associated with Q waves.  He did have hyperacute T waves at that time  --Repeat ECG in the emergency department showed worsening ST elevation in the inferior leads with developing reciprocal changes concerning for ongoing ischemia in the inferior territory  --Emergent coronary angiography revealed severe thrombotic stenosis of the proximal to mid RCA as the culprit lesion, underwent PCI with CHRISTOPHE x1 to the proximal to mid RCA  --Despite successful PCI to the RCA, persistent ST elevation with Q waves in the inferior leads again consistent with significantly delayed presentation  --Will obtain echocardiogram to evaluate LV systolic function  --Continue DAPT with aspirin and Plavix  --Start high intensity statin  --Beta-blocker and ACE inhibitor as tolerated by BP and heart rate  --Admit to ICU for close monitoring overnight    2.  Acute kidney injury  --Likely  prerenal secondary to dehydration in the setting of frequent nausea/vomiting  --Continue IV fluids    3.  Leukocytosis  --Suspect likely reactive, secondary to #1 above  --Hospital medicine consult to further evaluate    4.  Hypomagnesemia  --Replacement per protocol    5.  Cannabis use  --UDS pending    6.  Possible history of CVA  --Will ill obtain records for review of prior work-up      JEAN CLAUDE Grady MD  Interventional Cardiology    )07/14/21  23:32 EDT    Electronically signed by Farhad Grady MD at 07/14/21 1411          Emergency Department Notes      Jayy Tyler MD at 07/14/21 1946          Subjective   40-year-old male presenting with several complaints.  He states it is prior to arrival he heard a ringing noise in his ears, felt like he was going to have a seizure so called EMS.  He also notes that he has had some nausea and vomiting.  He arrives here diaphoretic.  He notes that a couple months ago he had a similar episode where he had ringing in his ear and in the neck thing he knew he was shaking on the ground.  He denies any fevers, chills, abdominal pain, chest pain.  He does have a history of previous CVA.  He does use marijuana.          Review of Systems   Constitutional: Positive for diaphoresis.   HENT: Negative.    Eyes: Negative.    Respiratory: Negative.    Cardiovascular: Negative.    Gastrointestinal: Positive for nausea and vomiting. Negative for abdominal pain.   Genitourinary: Negative.    Musculoskeletal: Negative.    Skin: Negative.    Neurological: Positive for seizures.   Psychiatric/Behavioral: Negative.        Past Medical History:   Diagnosis Date   • Anxiety    • Depression    • Glaucoma    • Hypertension    • Screening for neurological condition    • Stroke (CMS/HCC)        No Known Allergies    Past Surgical History:   Procedure Laterality Date   • FRACTURE SURGERY Right     ankle       Family History   Problem Relation Age of Onset   • Dementia Maternal  Grandmother        Social History     Socioeconomic History   • Marital status: Single     Spouse name: Not on file   • Number of children: Not on file   • Years of education: Not on file   • Highest education level: Not on file   Tobacco Use   • Smoking status: Never Smoker   • Smokeless tobacco: Never Used   Substance and Sexual Activity   • Alcohol use: Yes     Comment: OCC   • Drug use: Yes     Types: Marijuana     Comment: RARELY   • Sexual activity: Defer           Objective   Physical Exam  Vitals reviewed.   Constitutional:       General: He is not in acute distress.     Appearance: Normal appearance. He is not ill-appearing or toxic-appearing.      Comments: Diaphoretic   HENT:      Head: Normocephalic and atraumatic.      Right Ear: External ear normal.      Left Ear: External ear normal.      Nose: Nose normal.      Mouth/Throat:      Mouth: Mucous membranes are moist.      Pharynx: Oropharynx is clear.   Eyes:      Extraocular Movements: Extraocular movements intact.      Conjunctiva/sclera: Conjunctivae normal.      Comments: Pupils are mydriatic   Cardiovascular:      Rate and Rhythm: Regular rhythm.      Pulses: Normal pulses.      Heart sounds: Normal heart sounds.      Comments: Mild tachycardia  Pulmonary:      Effort: Pulmonary effort is normal. No respiratory distress.      Breath sounds: Normal breath sounds.   Abdominal:      General: Bowel sounds are normal. There is no distension.      Tenderness: There is no abdominal tenderness.   Musculoskeletal:         General: No swelling, tenderness or deformity. Normal range of motion.      Cervical back: Normal range of motion and neck supple.   Skin:     General: Skin is warm.      Capillary Refill: Capillary refill takes less than 2 seconds.      Findings: No rash.   Neurological:      General: No focal deficit present.      Mental Status: He is alert and oriented to person, place, and time.      Comments: Cranial nerves intact, normal strength and  "sensation   Psychiatric:         Mood and Affect: Mood normal.         Behavior: Behavior normal.         Procedures          ED Course                                           MDM  Number of Diagnoses or Management Options  Acute renal insufficiency  ST elevation myocardial infarction (STEMI), unspecified artery (CMS/HCC)  Diagnosis management comments: 40-year-old male feeling like he is going to have a seizure.  Well-developed, well-nourished man in no distress with exam as above.  His presenting exam is consistent with a sympathomimetic intoxication, he has dilated pupils and is very sweaty and tachycardic.  Some aspects of his history do sound consistent with seizure.  Will obtain labs, EKG and head CT.  Will give symptomatic treatment.  Disposition pending.    DDx: Seizure, intoxication, electrolyte abnormality    EKG interpreted by me: Sinus rhythm, normal rate, some nonspecific ST elevations without reciprocal ST depressions, this is an abnormal EKG, no previous for comparison    Labs resulted with a leukocytosis and acute renal insufficiency.  I suspect he is dehydrated.  His troponin was elevated.  I went in to discuss again, he had initially told myself and nursing that he had not had any chest pain.  He now reports that he may have had some \"heartburn\".  He is resting comfortably at this time.  I discussed case Dr. Grady, decision was made to repeat EKG, repeat EKG does show some more concerning findings with significant ST elevation and some new ST depressions.  Decision was made to activate the Cath Lab.  Plan of care discussed with patient and significant other, they are in agreement.    Repeat EKG interpreted by me: Sinus rhythm, normal rate, significant ST elevations in the inferior leads and anterior leads, there is also reciprocal ST depressions in lead I and aVL, this is an abnormal EKG    30 minutes of critical care provided. This time excludes other billable procedures. Time does include " preparation of documents, medical consultations, review of old records, and direct bedside care. Patient was at high risk for life-threatening deterioration due to STEMI.          Amount and/or Complexity of Data Reviewed  Decide to obtain previous medical records or to obtain history from someone other than the patient: yes    Critical Care  Total time providing critical care: 30-74 minutes      Final diagnoses:   ST elevation myocardial infarction (STEMI), unspecified artery (CMS/HCC)   Acute renal insufficiency          Jayy Tyler MD  07/14/21 2112      Electronically signed by Jayy Tyler MD at 07/14/21 2112     Karey Soto, RN at 07/14/21 2126        Pt to go to Cath lab with ST elevation. Pt is ready to go, undressed. House RN at the ED for paperwork and activate the cath lab.      Karey Soto RN  07/14/21 2127      Electronically signed by Karey Soto RN at 07/14/21 2127         Current Facility-Administered Medications   Medication Dose Route Frequency Provider Last Rate Last Admin   • acetaminophen (TYLENOL) tablet 650 mg  650 mg Oral Q4H PRN Dana Fuller, DO       • aspirin EC tablet 81 mg  81 mg Oral Daily JonnyDana, DO   81 mg at 07/15/21 0801   • atorvastatin (LIPITOR) tablet 80 mg  80 mg Oral Nightly JonnyDana angel, DO       • citalopram (CeleXA) tablet 20 mg  20 mg Oral [facility]   20 mg at 07/15/21 0801   • clopidogrel (PLAVIX) tablet 75 mg  75 mg Oral Daily JonnyDana angel, DO   75 mg at 07/15/21 0802   • famotidine (PEPCID) tablet 20 mg  20 mg Oral BID AC Bryant Davis MD       • labetalol (NORMODYNE,TRANDATE) injection 10 mg  10 mg Intravenous Q4H PRN Elsa Fullerar, DO       • lactated ringers infusion  150 mL/hr Intravenous Continuous JonnyDana angel,  mL/hr at 07/15/21 0707 150 mL/hr at 07/15/21 0707   • melatonin tablet 5 mg  5 mg Oral Nightly PRN Dana Fuller, DO       • metoprolol tartrate (LOPRESSOR) tablet 25 mg  25 mg Oral Q12H JonnyDana angel, DO   25  "mg at 07/15/21 0802   • nitroglycerin (TRIDIL) 200 mcg/ml infusion  5-200 mcg/min Intravenous Titrated Dana Fuller DO 6 mL/hr at 07/15/21 1000 20 mcg/min at 07/15/21 1000       Lab Results (last 24 hours)     Procedure Component Value Units Date/Time    Beta Hydroxybutyrate Quantitative [[id number]]  (Abnormal) Collected: 07/15/21 0419    Specimen: Blood Updated: 07/15/21 1324     Beta-Hydroxybutyrate Quant 0.528 mmol/L     Narrative:      In the assessment of possible diabetic ketoacidosis, the test should be interpreted along with other clinical and laboratory findings.  A level greater than 1 mmol/L should require further evaluation and levels of more than 3 mmol/L require immediate medical review.    Osmolality, Serum [[id number]]  (Normal) Collected: 07/15/21 0419    Specimen: Blood Updated: 07/15/21 1323     Osmolality 290 mOsm/kg     TSH [[id number]]  (Abnormal) Collected: 07/15/21 0419    Specimen: Blood Updated: 07/15/21 0857     TSH 4.490 uIU/mL     Procalcitonin [[id number]]  (Normal) Collected: 07/15/21 0419    Specimen: Blood Updated: 07/15/21 0756     Procalcitonin 0.04 ng/mL     Narrative:      As a Marker for Sepsis (Non-Neonates):     1. <0.5 ng/mL represents a low risk of severe sepsis and/or septic shock.  2. >2 ng/mL represents a high risk of severe sepsis and/or septic shock.    As a Marker for Lower Respiratory Tract Infections that require antibiotic therapy:  PCT on Admission     Antibiotic Therapy             6-12 Hrs later  >0.5                          Strongly Recommended            >0.25 - <0.5             Recommended  0.1 - 0.25                  Discouraged                       Remeasure/reassess PCT  <0.1                         Strongly Discouraged         Remeasure/reassess PCT      As 28 day mortality risk marker: \"Change in Procalcitonin Result\" (>80% or <=80%) if Day 0 (or Day 1) and Day 4 values are available. Refer to http://www.Mydishs-pct-calculator.com/    Change in PCT <=80 % "   A decrease of PCT levels below or equal to 80% defines a positive change in PCT test result representing a higher risk for 28-day all-cause mortality of patients diagnosed with severe sepsis or septic shock.    Change in PCT >80 %   A decrease of PCT levels of more than 80% defines a negative change in PCT result representing a lower risk for 28-day all-cause mortality of patients diagnosed with severe sepsis or septic shock.              Results may be falsely decreased if patient taking Biotin.     Magnesium [[id number]]  (Normal) Collected: 07/15/21 0419    Specimen: Blood Updated: 07/15/21 0706     Magnesium 2.3 mg/dL     Lactic Acid, Plasma [[id number]]  (Normal) Collected: 07/15/21 0419    Specimen: Blood Updated: 07/15/21 0552     Lactate 1.3 mmol/L     Basic Metabolic Panel [[id number]]  (Abnormal) Collected: 07/15/21 0419    Specimen: Blood Updated: 07/15/21 0548     Glucose 103 mg/dL      BUN 8 mg/dL      Creatinine 1.26 mg/dL      Sodium 136 mmol/L      Potassium 4.3 mmol/L      Chloride 99 mmol/L      CO2 24.0 mmol/L      Calcium 7.9 mg/dL      eGFR Non African Amer 64 mL/min/1.73      BUN/Creatinine Ratio 6.3     Anion Gap 13.0 mmol/L     Narrative:      GFR Normal >60  Chronic Kidney Disease <60  Kidney Failure <15      Lipid Panel [[id number]]  (Abnormal) Collected: 07/15/21 0419    Specimen: Blood Updated: 07/15/21 0543     Total Cholesterol 259 mg/dL      Triglycerides 104 mg/dL      HDL Cholesterol 21 mg/dL      LDL Cholesterol  219 mg/dL      VLDL Cholesterol 19 mg/dL      LDL/HDL Ratio 10.34    Narrative:      Cholesterol Reference Ranges  (U.S. Department of Health and Human Services ATP III Classifications)    Desirable          <200 mg/dL  Borderline High    200-239 mg/dL  High Risk          >240 mg/dL      Triglyceride Reference Ranges  (U.S. Department of Health and Human Services ATP III Classifications)    Normal           <150 mg/dL  Borderline High  150-199 mg/dL  High             200-499  mg/dL  Very High        >500 mg/dL    HDL Reference Ranges  (U.S. Department of Health and Human Services ATP III Classifcations)    Low     <40 mg/dl (major risk factor for CHD)  High    >60 mg/dl ('negative' risk factor for CHD)        LDL Reference Ranges  (U.S. Department of Health and Human Services ATP III Classifcations)    Optimal          <100 mg/dL  Near Optimal     100-129 mg/dL  Borderline High  130-159 mg/dL  High             160-189 mg/dL  Very High        >189 mg/dL    Hemoglobin A1c [[id number]]  (Abnormal) Collected: 07/15/21 0419    Specimen: Blood Updated: 07/15/21 0535     Hemoglobin A1C 4.60 %     Narrative:      Hemoglobin A1C Ranges:    Increased Risk for Diabetes  5.7% to 6.4%  Diabetes                     >= 6.5%  Diabetic Goal                < 7.0%    CBC (No Diff) [[id number]]  (Abnormal) Collected: 07/15/21 0419    Specimen: Blood Updated: 07/15/21 0528     WBC 27.73 10*3/mm3      RBC 5.84 10*6/mm3      Hemoglobin 17.6 g/dL      Hematocrit 50.7 %      MCV 86.8 fL      MCH 30.1 pg      MCHC 34.7 g/dL      RDW 15.1 %      RDW-SD 47.1 fl      MPV 10.0 fL      Platelets 384 10*3/mm3     Blood Gas, Venous With Co-Ox [[id number]]  (Abnormal) Collected: 07/15/21 0419    Specimen: Venous Blood Updated: 07/15/21 0419     Site OTHER     pH, Venous 7.453 pH Units      pCO2, Venous 40.1 mm Hg      Comment: 84 Value below reference range        pO2, Venous 73.1 mm Hg      Comment: 83 Value above reference range        HCO3, Venous 28.0 mmol/L      Base Excess, Venous 3.7 mmol/L      Comment: 83 Value above reference range        O2 Saturation, Venous 94.2 %      Comment: 83 Value above reference range        Oxyhemoglobin Venous 93.0 %      Comment: 83 Value above reference range        Methemoglobin Venous 0.1 %      Carboxyhemoglobin Venous 1.2 %      Barometric Pressure for Blood Gas 737 mmHg      Modality Room Air     FIO2 21 %      Ventilator Mode NA     Note --     Collected by LAB     Comment:  Meter: [id number]     :  [id number]       Salicylate Level [[id number]]  (Normal) Collected: 07/14/21 2011    Specimen: Blood Updated: 07/15/21 0125     Salicylate <0.3 mg/dL     COVID PRE-OP / PRE-PROCEDURE SCREENING ORDER (NO ISOLATION) - Swab, Nasal Cavity [[id number]]  (Normal) Collected: 07/14/21 2343    Specimen: Swab from Nasal Cavity Updated: 07/15/21 0039    Narrative:      The following orders were created for panel order COVID PRE-OP / PRE-PROCEDURE SCREENING ORDER (NO ISOLATION) - Swab, Nasal Cavity.  Procedure                               Abnormality         Status                     ---------                               -----------         ------                     COVID-19,[facility]...[[id number]]  Normal              Final result                 Please view results for these tests on the individual orders.    COVID-19,[facility] IN-HOUSE,Nasal Swab No Transport Media 3-4 HR TAT - Swab, Nasal Cavity [[id number]]  (Normal) Collected: 07/14/21 2343    Specimen: Swab from Nasal Cavity Updated: 07/15/21 0039     COVID19 Not Detected    Narrative:      Fact sheet for providers: https://www.fda.gov/media/330479/download     Fact sheet for patients: https://www.fda.gov/media/727976/download    Test performed by PCR.    Consider negative results in combination with clinical observations, patient history, and epidemiological information.    Urine Drug Screen - Urine, Clean Catch [[id number]]  (Abnormal) Collected: 07/14/21 2342    Specimen: Urine, Clean Catch Updated: 07/15/21 0023     THC, Screen, Urine Positive     Phencyclidine (PCP), Urine Negative     Cocaine Screen, Urine Negative     Methamphetamine, Ur Negative     Opiate Screen Negative     Amphetamine Screen, Urine Negative     Benzodiazepine Screen, Urine Negative     Tricyclic Antidepressants Screen Negative     Methadone Screen, Urine Negative     Barbiturates Screen, Urine Negative     Oxycodone Screen, Urine Negative      Propoxyphene Screen Negative     Buprenorphine, Screen, Urine Negative    Narrative:      Limitations of this procedure include the possibility of false positives due to interfering substances in the urine sample. Clinical data should be correlated with any questionable result. Positive results should be considered Presumptive Positive until results are confirmed with another methodology such as HPLC or GCMS.    Troponin [[id number]]  (Abnormal) Collected: 07/14/21 2011    Specimen: Blood Updated: 07/14/21 2056     Troponin T 0.213 ng/mL     Narrative:      Troponin T Reference Range:  <= 0.03 ng/mL-   Negative for AMI  >0.03 ng/mL-     Abnormal for myocardial necrosis.  Clinicians would have to utilize clinical acumen, EKG, Troponin and serial changes to determine if it is an Acute Myocardial Infarction or myocardial injury due to an underlying chronic condition.       Results may be falsely decreased if patient taking Biotin.      Comprehensive Metabolic Panel [[id number]]  (Abnormal) Collected: 07/14/21 2011    Specimen: Blood Updated: 07/14/21 2046     Glucose 126 mg/dL      BUN 10 mg/dL      Creatinine 1.72 mg/dL      Sodium 136 mmol/L      Potassium 3.7 mmol/L      Comment: Slight hemolysis detected by analyzer. Results may be affected.        Chloride 90 mmol/L      CO2 7.2 mmol/L      Calcium 9.9 mg/dL      Total Protein 8.4 g/dL      Albumin 4.30 g/dL      ALT (SGPT) 31 U/L      AST (SGOT) 58 U/L      Alkaline Phosphatase 78 U/L      Total Bilirubin 0.7 mg/dL      eGFR Non African Amer 45 mL/min/1.73      Globulin 4.1 gm/dL      A/G Ratio 1.0 g/dL      BUN/Creatinine Ratio 5.8     Anion Gap 38.8 mmol/L     Narrative:      GFR Normal >60  Chronic Kidney Disease <60  Kidney Failure <15      CBC & Differential [[id number]]  (Abnormal) Collected: 07/14/21 2011    Specimen: Blood Updated: 07/14/21 2035    Narrative:      The following orders were created for panel order CBC & Differential.  Procedure                                Abnormality         Status                     ---------                               -----------         ------                     CBC Auto Differential[[id number]]        Abnormal            Final result                 Please view results for these tests on the individual orders.    CBC Auto Differential [[id number]]  (Abnormal) Collected: 21    Specimen: Blood Updated: 21     WBC 24.03 10*3/mm3      RBC 6.46 10*6/mm3      Hemoglobin 19.4 g/dL      Hematocrit 60.5 %      MCV 93.7 fL      MCH 30.0 pg      MCHC 32.1 g/dL      RDW 15.7 %      RDW-SD 52.6 fl      MPV 10.5 fL      Platelets 487 10*3/mm3      Neutrophil % 66.0 %      Lymphocyte % 20.3 %      Monocyte % 9.5 %      Eosinophil % 1.0 %      Basophil % 0.6 %      Immature Grans % 2.6 %      Neutrophils, Absolute 15.84 10*3/mm3      Lymphocytes, Absolute 4.89 10*3/mm3      Monocytes, Absolute 2.29 10*3/mm3      Eosinophils, Absolute 0.25 10*3/mm3      Basophils, Absolute 0.14 10*3/mm3      Immature Grans, Absolute 0.62 10*3/mm3      nRBC 0.0 /100 WBC         Imaging Results (Last 24 Hours)     ** No results found for the last 24 hours. **           Physician Progress Notes (last 24 hours) (Notes from 21 1353 through 07/15/21 1353)      Bryant Davis MD at 07/15/21 [address] [place], [place] [address]IST    PROGRESS NOTE    Name:  Girma Chapman   Age:  40 y.o.  Sex:  male  :  1980  MRN:  [id number]   Visit Number:  [id number]  Admission Date:  2021  Date Of Service:  07/15/21  Primary Care Physician:  Girma Arambula,      LOS: 1 day :    Chief Complaint:      Follow-up of STEMI.    Subjective:    Mr. Chapman was seen and examined this morning.  He is currently lying down on the bed and is comfortable at rest.  He is currently on nitroglycerin drip.  He does have history of CVA and was admitted with vague chest pains and tingling in his ears.  Patient was noted to have late presentation  of STEMI and was taken to the Cath Lab by Dr. Grady yesterday with placement of RCA stent.  Patient states that he has strong family history of coronary artery disease in multiple young family members.  He denies using cocaine.  His urine tox screen was positive for THC.  Previous physician documentation, laboratory and imaging data have been reviewed.    Review of Systems:     All systems were reviewed and negative except as mentioned in subjective, assessment and plan.    Vital Signs:    Temp:  [97.5 °F (36.4 °C)-98.5 °F (36.9 °C)] 98.4 °F (36.9 °C)  Heart Rate:  [] 80  Resp:  [14-20] 16  BP: (114-188)/() 130/98    Intake and output:    I/O last 3 completed shifts:  In: 1143 [P.O.:115; I.V.:1028]  Out: 1150 [Urine:1150]  I/O this shift:  In: 230 [P.O.:230]  Out: 175 [Urine:175]    Physical Examination:    General Appearance:  Alert and cooperative.   Head:  Atraumatic and normocephalic.   Eyes: Conjunctivae and sclerae normal, no icterus. No pallor.   Throat: No oral lesions, no thrush, oral mucosa moist.   Neck: Supple, trachea midline, no thyromegaly.   Lungs:   Breath sounds heard bilaterally equally.  No crackles or wheezing. No Pleural rub or bronchial breathing.   Heart:  Normal S1 and S2, no murmur, no gallop, no rub. No JVD.   Abdomen:   Normal bowel sounds, no masses, no organomegaly. Soft, nontender, nondistended, no rebound tenderness.   Extremities: Supple, no edema, no cyanosis, no clubbing.   Skin: No bleeding or rash.  Multiple tattoos are noted throughout the body.   Neurologic: Alert and oriented x 3. No facial asymmetry. Moves all four limbs. No tremors.      Laboratory results:    Results from last 7 days   Lab Units 07/15/21  0419 07/14/21 2011 07/12/21  0950   SODIUM mmol/L 136 136 136   POTASSIUM mmol/L 4.3 3.7 5.0   CHLORIDE mmol/L 99 90* 97*   CO2 mmol/L 24.0 7.2* 24.5   BUN mg/dL 8 10 7   CREATININE mg/dL 1.26 1.72* 1.37*   CALCIUM mg/dL 7.9* 9.9 9.4   BILIRUBIN mg/dL  --  0.7  0.7   ALK PHOS U/L  --  78 57   ALT (SGPT) U/L  --  31 26   AST (SGOT) U/L  --  58* 32   GLUCOSE mg/dL 103* 126* 105*     Results from last 7 days   Lab Units 07/15/21  0419 07/14/21 2011 07/12/21  0950   WBC 10*3/mm3 27.73* 24.03* 12.36*   HEMOGLOBIN g/dL 17.6 19.4* 19.1*   HEMATOCRIT % 50.7 60.5* 57.1*   PLATELETS 10*3/mm3 384 487* 413         Results from last 7 days   Lab Units 07/14/21 2011   TROPONIN T ng/mL 0.213*     I have reviewed the patient's laboratory results.    Radiology results:    Adult Transthoracic Echo Complete W/ Cont if Necessary Per Protocol    Result Date: 7/15/2021  · Left ventricular wall thickness is consistent with mild to moderate concentric hypertrophy. · Estimated left ventricular EF = 55% Left ventricular ejection fraction appears to be 51 - 55%. Left ventricular systolic function is normal. · Left ventricular diastolic function was normal. · Estimated right ventricular systolic pressure from tricuspid regurgitation is normal (<35 mmHg).      I have reviewed the patient's radiology reports.    Medication Review:     I have reviewed the patient's active and prn medications.     Problem List:      ST elevation myocardial infarction (STEMI) (CMS/[facility])    Acute renal failure (ARF) (CMS/[facility])    Assessment:    1. Acute inferior wall STEMI, POA.  2. Acute renal failure, POA, improving.  3. Leukocytosis likely related to myocardial infarction, POA.  4. History of CVA.    Plan:    Mr. Chapman is currently hemodynamically stable and is on IV fluids.  He has been started on dual antiplatelet agents with aspirin and Plavix as well as atorvastatin.  His renal function has improved.  His leukocytosis is likely secondary to myocardial infarction.  At this time I do not see any evidence of infection.  He does not need antibiotics at this time.  2D echocardiogram is fairly within normal range.  He will be transferred out to the medical floor with telemetry and may be able to go home tomorrow.  He will  need outpatient follow-up with Dr. Grady.    DVT Prophylaxis: SCDs  Code Status: Full  Diet: Cardiac    Bryant Davis MD  07/15/21  12:36 EDT    Dictated utilizing Dragon dictation.      Electronically signed by Bryant Davis MD at 07/15/21 1237          Consult Notes (last 24 hours) (Notes from 21 1353 through 07/15/21 1353)      Dana Fuller DO at 07/15/21 0108      Consult Orders    1. Inpatient Hospitalist Consult [[id number]] ordered by Farhad Grady MD at 21 2346                   [facility]      Name:  Girma Chapman   Age:  40 y.o.  Sex:  male  :  1980  MRN:  [id number]   Visit Number:  [id number]  Admission Date:  2021  Date Of Service:  07/15/21  Primary Care Physician:  Girma Arambula DO    Consulting Physician: Dana Fuller DO    Referring Physician: Dr. Grady    Reason For Consult: Acute kidney injury, leukocytosis    Chief Complaint: Heartburn    History Of Presenting Illness: 40 M h/o stroke with resolving deficits which were inclusive of amnesia and left-sided hemiparesis who presented to the ED with complaints of ringing in his ears onset this evening.  He was worried that he may have a seizure, but does not recall that he actually did have a seizure. Significant other bedside corroborates a history of what sounds like a GTC. He was seen in the ED 2 days ago for nausea and vomiting,  THC was detected in the urine and it was thought to be CHS.  A troponin and EKG were obtained this evening which revealed findings consistent with inferior STEMI and Cath Lab was activated. He underwent stenting to the RCA.  Labs revealed a WBC 20 4K, KAVON and a medicine consultation was requested.  Patient reports persistent nausea and vomiting over the past few weeks with associated heartburn.  Currently, he is chest pain-free and comfortable. Nursing notes elevated BP and HR.    Review Of Systems: A full 14 point review of systems was performed  and all pertinent positives and negatives are noted in the HPI.   Past Medical History:  Past Medical History:   Diagnosis Date   • Anxiety    • Depression    • Glaucoma    • Hypertension    • Screening for neurological condition    • Stroke (CMS/HCC)        Past Surgical history:  Past Surgical History:   Procedure Laterality Date   • FRACTURE SURGERY Right     ankle     Social History:  Social History     Socioeconomic History   • Marital status: Single     Spouse name: Not on file   • Number of children: Not on file   • Years of education: Not on file   • Highest education level: Not on file   Tobacco Use   • Smoking status: Never Smoker   • Smokeless tobacco: Never Used   Substance and Sexual Activity   • Alcohol use: Yes     Comment: OCC   • Drug use: Yes     Types: Marijuana     Comment: RARELY   • Sexual activity: Defer     Family History:  Family History   Problem Relation Age of Onset   • Dementia Maternal Grandmother      Allergies:  Patient has no known allergies.    Home Medications:  Prior to Admission Medications     Prescriptions Last Dose Informant Patient Reported? Taking?    aspirin 81 MG EC tablet 7/14/2021  Yes Yes    Take 81 mg by mouth Daily.    citalopram (CeleXA) 20 MG tablet 7/13/2021  Yes Yes    Take 20 mg by mouth Daily.    dicyclomine (BENTYL) 20 MG tablet 7/14/2021  No Yes    Take 1 tablet by mouth Every 6 (Six) Hours As Needed (Abdominal cramping).    lisinopril (PRINIVIL,ZESTRIL) 10 MG tablet 7/14/2021  Yes Yes    Take 10 mg by mouth Daily.    ondansetron ODT (ZOFRAN-ODT) 4 MG disintegrating tablet 7/14/2021  No Yes    Place 1 tablet on the tongue Every 6 (Six) Hours As Needed for Nausea or Vomiting.         Hospital Scheduled Meds:aspirin, 81 mg, Oral, Daily  atorvastatin, 80 mg, Oral, Nightly  clopidogrel, 75 mg, Oral, Daily  sodium bicarbonate, 1,300 mg, Oral, 4x Daily      lactated ringers, 150 mL/hr, Last Rate: 150 mL/hr (07/15/21 0041)    Vital Signs:Temp:  [97.5 °F (36.4  °C)-98.1 °F (36.7 °C)] 98.1 °F (36.7 °C)  Heart Rate:  [] 92  Resp:  [14-20] 20  BP: (156-180)/() 176/120        07/14/21 1928 07/14/21  2348   Weight: 93 kg (205 lb) 88.2 kg (194 lb 6.4 oz)     Body mass index is 28.71 kg/m².    Physical Exam:  General: NAD, resting in bed  HEENT: EOMI, NC/AT  Heart: regular  Lungs: nonlabored  Abdomen: Soft, nondistended, nontender  Extremities: No edema  Neurological: A&O x3, moves all extremities  Psychological: Mood and affect appropriate, speech is coherent  Skin: warm, dry    Laboratory data:  Results from last 7 days   Lab Units 07/14/21 2011 07/12/21  0950   SODIUM mmol/L 136 136   POTASSIUM mmol/L 3.7 5.0   CHLORIDE mmol/L 90* 97*   CO2 mmol/L 7.2* 24.5   BUN mg/dL 10 7   CREATININE mg/dL 1.72* 1.37*   CALCIUM mg/dL 9.9 9.4   BILIRUBIN mg/dL 0.7 0.7   ALK PHOS U/L 78 57   ALT (SGPT) U/L 31 26   AST (SGOT) U/L 58* 32   GLUCOSE mg/dL 126* 105*     Results from last 7 days   Lab Units 07/14/21 2011 07/12/21  0950   WBC 10*3/mm3 24.03* 12.36*   HEMOGLOBIN g/dL 19.4* 19.1*   HEMATOCRIT % 60.5* 57.1*   PLATELETS 10*3/mm3 487* 413         Results from last 7 days   Lab Units 07/14/21 2011   TROPONIN T ng/mL 0.213*             Results from last 7 days   Lab Units 07/12/21  0950   LIPASE U/L 36               Invalid input(s): USDES,  BLOODU, NITRITITE, BACT, EP  Pain Management Panel     Pain Management Panel Latest Ref Rng & Units 7/14/2021    AMPHETAMINES SCREEN, URINE Negative Negative    BARBITURATES SCREEN Negative Negative    BENZODIAZEPINE SCREEN, URINE Negative Negative    BUPRENORPHINEUR Negative Negative    COCAINE SCREEN, URINE Negative Negative    METHADONE SCREEN, URINE Negative Negative    METHAMPHETAMINEUR Negative Negative        EKG:  Personally reviewed and interpreted by myself reveals sinus rhythm with hyperacute T waves throughout the precordial leads with ST elevations in the inferior leads and reciprocal depressions in aVL, rate 114, QTc  376  Second EKG reveals more pronounced ST elevations in the inferior leads qualifying for STEMI    Radiology:  Imaging Results (Last 72 Hours)     ** No results found for the last 72 hours. **          Assessment:   STEMI s/p PCI RCA  Acute kidney injury with HAGMA  Intravascular volume depletion nausea and vomiting  Cannabis use  Seizure, suspect provoked   Polycythemia  Chronic/Hx: Anxiety/depression, HTN, stroke    Recommendations/Plan:   -I have switched the NS to LR for more balanced crystalloid especially given the acidosis and KAVON; Na bicarb tablets for the acidosis  -Keep him on clears tonight, advance diet as tolerated  -He has a significant anion gap, but does not appear as ill anymore. Repeat a chemistry panel along with lactate, BHB, salicylate level. He denies overdosing on his aspirin that he takes daily.  -Suspect leukocytosis is from stress reaction and seizure  -Do not think he needs AEDs at this time as I suspect it was provoked in the setting of STEMI and KAVON  -Continue DAPT, statin; add BB, and nitroglycerin gtt given rising BP, PRN Labetalol, defer ACEi for now given the KAVON  -Restart home Celexa, melatonin as needed for sleep, IV H2B for 'heartburn'  -Polycythemia developing rather quickly over the past 2 years which could be secondary to his cannabis use, but will need to be investigated further with OP heme f/u given his significant CVA/CAD. May also benefit from FRANCOISE eval, but doubt this is primary contributing factor.  -Thanks for this consult, we will continue to follow, please call with questions    Dana Fuller DO  07/15/21  01:08 EDT    Dictated utilizing Dragon dictation.      Electronically signed by Dana Fuller DO at 07/15/21 0511

## 2021-07-15 NOTE — PLAN OF CARE
Goal Outcome Evaluation:  Plan of Care Reviewed With: patient, significant other        Progress: improving  Outcome Summary: No complaints of chest pain this shift. NTG drip still in use per parameters. Awakened once from nap confused and getting out of bed. Reoriented quickly and easily. Family was at bedside. No further issues with orientation. No falls. Will continue POC and monitoring.

## 2021-07-15 NOTE — ED NOTES
Pt to go to Cath lab with ST elevation. Pt is ready to go, undressed. House RN at the ED for paperwork and activate the cath lab.      Karey Soto RN  07/14/21 2127

## 2021-07-15 NOTE — H&P
"     [facility] Cardiology History and Physical    Girma Chapman  1980  [id number]  07/14/21     Chief Complaint: Chest discomfort and nausea    History of Present Illness:   Mr. Girma Chapman is a 40 y.o. male who is being admitted by cardiology following emergent PCI for delayed presentation STEMI.  The patient has a past medical history significant for a possible prior CVA, as well as cannabis use.  He does not have any significant past cardiac history or significant family cardiac history.  The patient and his family reports over the past 5 days he has been having \"heartburn\" as well as frequent vomiting as well as diaphoresis.  He did present to the emergency department for evaluation of his symptoms on 7/12, however no ECG or troponin levels obtained at that time.  He was separately discharged home with suspected cyclical vomiting secondary to chronic cannabis use.  Upon return home, the patient continued to have episodes of heartburn as well as frequent vomiting.  On the evening of presentation, the patient developed a \"ringing\" in his ears and felt as though he was going to have a seizure.  He subsequently called EMS for evaluation and was transported to the emergency department.  While the emergency department, the patient initially denied chest pain, however on further questioning did report his history of mild chest discomfort of the past several days.  An initial EKG showed ST elevation in the inferior leads associated with Q waves, as well as hyperacute T waves.  Initial troponin was found to be mildly elevated at 0.213.  Other labs were significant for acute kidney injury with a creatinine elevated at 1.72, and he had a leukocytosis with a WBC of 24.  A repeat ECG in the emergency department showed persistent Q waves in the inferior leads, however he had worsening ST elevation as well as developing reciprocal changes concerning for acute ischemia.  Given mild chest discomfort in the " emergency department and dynamic ST changes on ECG, a code STEMI was activated and he was taken to the Cath Lab for emergent coronary angiography with online intervention as indicated.    Prior Cardiac Testin. Last Coronary Angio: None  2. Prior Stress Testing: None  3. Last Echo: None  4. Prior Holter Monitor: None    Review of Systems:   Review of Systems   Constitutional: Positive for diaphoresis. Negative for activity change, appetite change, chills, fatigue, fever, unexpected weight gain and unexpected weight loss.   Eyes: Negative for blurred vision and double vision.   Respiratory: Negative for cough, chest tightness, shortness of breath and wheezing.    Cardiovascular: Positive for chest pain. Negative for palpitations and leg swelling.   Gastrointestinal: Positive for nausea and vomiting. Negative for abdominal pain, anal bleeding, blood in stool and GERD.   Endocrine: Negative for cold intolerance and heat intolerance.   Genitourinary: Negative for hematuria.   Neurological: Negative for dizziness, syncope, weakness and light-headedness.   Hematological: Does not bruise/bleed easily.   Psychiatric/Behavioral: Negative for depressed mood and stress. The patient is not nervous/anxious.        Past Medical History:   Past Medical History:   Diagnosis Date   • Anxiety    • Depression    • Glaucoma    • Hypertension    • Screening for neurological condition    • Stroke (CMS/[facility])        Past Surgical History:   Past Surgical History:   Procedure Laterality Date   • FRACTURE SURGERY Right     ankle       Family History:   Family History   Problem Relation Age of Onset   • Dementia Maternal Grandmother        Social History:   Social History     Socioeconomic History   • Marital status: Single     Spouse name: Not on file   • Number of children: Not on file   • Years of education: Not on file   • Highest education level: Not on file   Tobacco Use   • Smoking status: Never Smoker   • Smokeless tobacco: Never  "Used   Substance and Sexual Activity   • Alcohol use: Yes     Comment: OCC   • Drug use: Yes     Types: Marijuana     Comment: RARELY   • Sexual activity: Defer       Medications:     Current Facility-Administered Medications:   •  acetaminophen (TYLENOL) tablet 650 mg, 650 mg, Oral, Q4H PRN, Farhad Grady MD  •  [START ON 7/15/2021] aspirin EC tablet 81 mg, 81 mg, Oral, Daily, Farhad Grady MD  •  [START ON 7/15/2021] atorvastatin (LIPITOR) tablet 80 mg, 80 mg, Oral, Nightly, Farhad Grady MD  •  [START ON 7/15/2021] clopidogrel (PLAVIX) tablet 75 mg, 75 mg, Oral, Daily, Farhad Grady MD  •  sodium chloride 0.9 % infusion, 3 mL/kg/hr, Intravenous, Continuous, Farhad Grady MD    Allergies:   No Known Allergies    Physical Exam:  Vital Signs:   Vitals:    07/14/21 1928   BP: 156/95   BP Location: Right arm   Patient Position: Sitting   Pulse: 114   Resp: 14   Temp: 97.5 °F (36.4 °C)   TempSrc: Oral   SpO2: 95%   Weight: 93 kg (205 lb)   Height: 175.3 cm (69\")       Physical Exam  Constitutional:       General: He is not in acute distress.     Appearance: Normal appearance. He is well-developed. He is not diaphoretic.   HENT:      Head: Normocephalic and atraumatic.   Eyes:      General: No scleral icterus.     Pupils: Pupils are equal, round, and reactive to light.   Neck:      Trachea: No tracheal deviation.   Cardiovascular:      Rate and Rhythm: Normal rate and regular rhythm.      Heart sounds: Normal heart sounds. No murmur heard.   No friction rub. No gallop.       Comments: Normal JVD.  Pulmonary:      Effort: Pulmonary effort is normal. No respiratory distress.      Breath sounds: Normal breath sounds. No stridor. No wheezing or rales.   Chest:      Chest wall: No tenderness.   Abdominal:      General: Bowel sounds are normal. There is no distension.      Palpations: Abdomen is soft.      Tenderness: There is no abdominal tenderness. There is no guarding or rebound. "   Musculoskeletal:         General: No swelling. Normal range of motion.      Cervical back: Neck supple. No tenderness.   Lymphadenopathy:      Cervical: No cervical adenopathy.   Skin:     General: Skin is warm and dry.      Findings: No erythema.   Neurological:      General: No focal deficit present.      Mental Status: He is alert and oriented to person, place, and time.   Psychiatric:         Mood and Affect: Mood normal.         Behavior: Behavior normal.         Results Review:   Results from last 7 days   Lab Units 07/14/21 2011   SODIUM mmol/L 136   POTASSIUM mmol/L 3.7   CHLORIDE mmol/L 90*   CO2 mmol/L 7.2*   BUN mg/dL 10   CREATININE mg/dL 1.72*   CALCIUM mg/dL 9.9   BILIRUBIN mg/dL 0.7   ALK PHOS U/L 78   ALT (SGPT) U/L 31   AST (SGOT) U/L 58*   GLUCOSE mg/dL 126*     Results from last 7 days   Lab Units 07/14/21 2011   TROPONIN T ng/mL 0.213*     @LABRCNTbnp@  Results from last 7 days   Lab Units 07/14/21 2011 07/12/21  0950   WBC 10*3/mm3 24.03* 12.36*   HEMOGLOBIN g/dL 19.4* 19.1*   HEMATOCRIT % 60.5* 57.1*   PLATELETS 10*3/mm3 487* 413         Results from last 7 days   Lab Units 07/12/21  0950   MAGNESIUM mg/dL 1.4*       I personally viewed and interpreted the patient's EKG/Telemetry data     Assessment / Plan:     1.  Inferior STEMI with delayed presentation  --Several day history of substernal chest discomfort as well as diaphoresis and recurrent nausea, onset approximately 5 days prior to presentation  --ECG on initial presentation showed ST elevation in inferior leads, however associated with Q waves.  He did have hyperacute T waves at that time  --Repeat ECG in the emergency department showed worsening ST elevation in the inferior leads with developing reciprocal changes concerning for ongoing ischemia in the inferior territory  --Emergent coronary angiography revealed severe thrombotic stenosis of the proximal to mid RCA as the culprit lesion, underwent PCI with CHRISTOPHE x1 to the proximal to  mid RCA  --Despite successful PCI to the RCA, persistent ST elevation with Q waves in the inferior leads again consistent with significantly delayed presentation  --Will obtain echocardiogram to evaluate LV systolic function  --Continue DAPT with aspirin and Plavix  --Start high intensity statin  --Beta-blocker and ACE inhibitor as tolerated by BP and heart rate  --Admit to ICU for close monitoring overnight    2.  Acute kidney injury  --Likely prerenal secondary to dehydration in the setting of frequent nausea/vomiting  --Continue IV fluids    3.  Leukocytosis  --Suspect likely reactive, secondary to #1 above  --Hospital medicine consult to further evaluate    4.  Hypomagnesemia  --Replacement per protocol    5.  Cannabis use  --UDS pending    6.  Possible history of CVA  --Will ill obtain records for review of prior work-up      JEAN CLAUDE Grady MD  Interventional Cardiology    )07/14/21  23:32 EDT

## 2021-07-15 NOTE — PLAN OF CARE
Goal Outcome Evaluation:              Outcome Summary: Pt without CP/SOA complaints overnight.  NTG gtt/lopressor initiated earlier in shift for elevated bp.  Pt continues to experience urgency.frequency with urination, which he says is not new.  AM ekg in chart.

## 2021-07-15 NOTE — CONSULTS
[facility]      Name:  Girma Chapman   Age:  40 y.o.  Sex:  male  :  1980  MRN:  [id number]   Visit Number:  [id number]  Admission Date:  2021  Date Of Service:  07/15/21  Primary Care Physician:  Girma Arambula DO    Consulting Physician: Dana Fuller DO    Referring Physician: Dr. Grady    Reason For Consult: Acute kidney injury, leukocytosis    Chief Complaint: Heartburn    History Of Presenting Illness: 40 M h/o stroke with resolving deficits which were inclusive of amnesia and left-sided hemiparesis who presented to the ED with complaints of ringing in his ears onset this evening.  He was worried that he may have a seizure, but does not recall that he actually did have a seizure. Significant other bedside corroborates a history of what sounds like a GTC. He was seen in the ED 2 days ago for nausea and vomiting,  THC was detected in the urine and it was thought to be CHS.  A troponin and EKG were obtained this evening which revealed findings consistent with inferior STEMI and Cath Lab was activated. He underwent stenting to the RCA.  Labs revealed a WBC 20 4K, KAVON and a medicine consultation was requested.  Patient reports persistent nausea and vomiting over the past few weeks with associated heartburn.  Currently, he is chest pain-free and comfortable. Nursing notes elevated BP and HR.    Review Of Systems: A full 14 point review of systems was performed and all pertinent positives and negatives are noted in the HPI.   Past Medical History:  Past Medical History:   Diagnosis Date   • Anxiety    • Depression    • Glaucoma    • Hypertension    • Screening for neurological condition    • Stroke (CMS/[facility])        Past Surgical history:  Past Surgical History:   Procedure Laterality Date   • FRACTURE SURGERY Right     ankle     Social History:  Social History     Socioeconomic History   • Marital status: Single     Spouse name: Not on file   • Number of  children: Not on file   • Years of education: Not on file   • Highest education level: Not on file   Tobacco Use   • Smoking status: Never Smoker   • Smokeless tobacco: Never Used   Substance and Sexual Activity   • Alcohol use: Yes     Comment: OCC   • Drug use: Yes     Types: Marijuana     Comment: RARELY   • Sexual activity: Defer     Family History:  Family History   Problem Relation Age of Onset   • Dementia Maternal Grandmother      Allergies:  Patient has no known allergies.    Home Medications:  Prior to Admission Medications     Prescriptions Last Dose Informant Patient Reported? Taking?    aspirin 81 MG EC tablet 7/14/2021  Yes Yes    Take 81 mg by mouth Daily.    citalopram (CeleXA) 20 MG tablet 7/13/2021  Yes Yes    Take 20 mg by mouth Daily.    dicyclomine (BENTYL) 20 MG tablet 7/14/2021  No Yes    Take 1 tablet by mouth Every 6 (Six) Hours As Needed (Abdominal cramping).    lisinopril (PRINIVIL,ZESTRIL) 10 MG tablet 7/14/2021  Yes Yes    Take 10 mg by mouth Daily.    ondansetron ODT (ZOFRAN-ODT) 4 MG disintegrating tablet 7/14/2021  No Yes    Place 1 tablet on the tongue Every 6 (Six) Hours As Needed for Nausea or Vomiting.         Hospital Scheduled Meds:aspirin, 81 mg, Oral, Daily  atorvastatin, 80 mg, Oral, Nightly  clopidogrel, 75 mg, Oral, Daily  sodium bicarbonate, 1,300 mg, Oral, 4x Daily      lactated ringers, 150 mL/hr, Last Rate: 150 mL/hr (07/15/21 0041)    Vital Signs:Temp:  [97.5 °F (36.4 °C)-98.1 °F (36.7 °C)] 98.1 °F (36.7 °C)  Heart Rate:  [] 92  Resp:  [14-20] 20  BP: (156-180)/() 176/120        07/14/21  1928 07/14/21  2348   Weight: 93 kg (205 lb) 88.2 kg (194 lb 6.4 oz)     Body mass index is 28.71 kg/m².    Physical Exam:  General: NAD, resting in bed  HEENT: EOMI, NC/AT  Heart: regular  Lungs: nonlabored  Abdomen: Soft, nondistended, nontender  Extremities: No edema  Neurological: A&O x3, moves all extremities  Psychological: Mood and affect appropriate, speech is  coherent  Skin: warm, dry    Laboratory data:  Results from last 7 days   Lab Units 07/14/21 2011 07/12/21  0950   SODIUM mmol/L 136 136   POTASSIUM mmol/L 3.7 5.0   CHLORIDE mmol/L 90* 97*   CO2 mmol/L 7.2* 24.5   BUN mg/dL 10 7   CREATININE mg/dL 1.72* 1.37*   CALCIUM mg/dL 9.9 9.4   BILIRUBIN mg/dL 0.7 0.7   ALK PHOS U/L 78 57   ALT (SGPT) U/L 31 26   AST (SGOT) U/L 58* 32   GLUCOSE mg/dL 126* 105*     Results from last 7 days   Lab Units 07/14/21 2011 07/12/21  0950   WBC 10*3/mm3 24.03* 12.36*   HEMOGLOBIN g/dL 19.4* 19.1*   HEMATOCRIT % 60.5* 57.1*   PLATELETS 10*3/mm3 487* 413         Results from last 7 days   Lab Units 07/14/21 2011   TROPONIN T ng/mL 0.213*             Results from last 7 days   Lab Units 07/12/21  0950   LIPASE U/L 36               Invalid input(s): USDES,  BLOODU, NITRITITE, BACT, EP  Pain Management Panel     Pain Management Panel Latest Ref Rng & Units 7/14/2021    AMPHETAMINES SCREEN, URINE Negative Negative    BARBITURATES SCREEN Negative Negative    BENZODIAZEPINE SCREEN, URINE Negative Negative    BUPRENORPHINEUR Negative Negative    COCAINE SCREEN, URINE Negative Negative    METHADONE SCREEN, URINE Negative Negative    METHAMPHETAMINEUR Negative Negative        EKG:  Personally reviewed and interpreted by myself reveals sinus rhythm with hyperacute T waves throughout the precordial leads with ST elevations in the inferior leads and reciprocal depressions in aVL, rate 114, QTc 376  Second EKG reveals more pronounced ST elevations in the inferior leads qualifying for STEMI    Radiology:  Imaging Results (Last 72 Hours)     ** No results found for the last 72 hours. **          Assessment:   STEMI s/p PCI RCA  Acute kidney injury with HAGMA  Intravascular volume depletion nausea and vomiting  Cannabis use  Seizure, suspect provoked   Polycythemia  Chronic/Hx: Anxiety/depression, HTN, stroke    Recommendations/Plan:   -I have switched the NS to LR for more balanced crystalloid  especially given the acidosis and KAVON; Na bicarb tablets for the acidosis  -Keep him on clears tonight, advance diet as tolerated  -He has a significant anion gap, but does not appear as ill anymore. Repeat a chemistry panel along with lactate, BHB, salicylate level. He denies overdosing on his aspirin that he takes daily.  -Suspect leukocytosis is from stress reaction and seizure  -Do not think he needs AEDs at this time as I suspect it was provoked in the setting of STEMI and KAVON  -Continue DAPT, statin; add BB, and nitroglycerin gtt given rising BP, PRN Labetalol, defer ACEi for now given the KAVON  -Restart home Celexa, melatonin as needed for sleep, IV H2B for 'heartburn'  -Polycythemia developing rather quickly over the past 2 years which could be secondary to his cannabis use, but will need to be investigated further with OP heme f/u given his significant CVA/CAD. May also benefit from FRANCOISE eval, but doubt this is primary contributing factor.  -Thanks for this consult, we will continue to follow, please call with questions    Dana Fuller DO  07/15/21  01:08 EDT    Dictated utilizing Dragon dictation.

## 2021-07-15 NOTE — CASE MANAGEMENT/SOCIAL WORK
Discharge Planning Assessment  [facility]     Patient Name: Girma Chapman  MRN: [id number]  Today's Date: 7/15/2021    Admit Date: 7/14/2021    Discharge Needs Assessment     Row Name 07/15/21 1531       Living Environment    Lives With  alone    Current Living Arrangements  home/apartment/condo    Primary Care Provided by  self    Provides Primary Care For  no one    Family Caregiver if Needed  parent(s)    Quality of Family Relationships  supportive;involved;helpful       Resource/Environmental Concerns    Resource/Environmental Concerns  none       Transition Planning    Patient/Family Anticipates Transition to  home    Patient/Family Anticipated Services at Transition  none    Transportation Anticipated  family or friend will provide       Discharge Needs Assessment    Readmission Within the Last 30 Days  no previous admission in last 30 days    Equipment Currently Used at Home  none    Concerns to be Addressed  no discharge needs identified    Equipment Needed After Discharge  none        Discharge Plan     Row Name 07/15/21 1531       Plan    Plan Comments SW met with pt at bedside to complete discharge planning. Pt confirmed his address as listed in the chart. Pt states that he lives alone and provides care to himself. Pt states that he is not in need of any HH or equipment. Pt states that his PCP is Gabriel Mandel in [place]. Pt states that his family will transport him home at discharge. Pt states that the only thing he thinks he would need is anxiety medication at discharge. Pt also states that he needs a work excuse. SW advised him to speak with his nurse at discharge for the work excuse. No other CM needs have been identified at this time. SW will continue to follow and assist as needed.        Continued Care and Services - Admitted Since 7/14/2021    Coordination has not been started for this encounter.         Demographic Summary     Row Name 07/15/21 1529       General Information    Admission Type   inpatient    Arrived From  emergency department    Referral Source  admission list    Reason for Consult  discharge planning    Preferred Language  English        Functional Status     Row Name 07/15/21 1529       Functional Status, IADL    Medications  independent    Meal Preparation  independent    Housekeeping  independent    Laundry  independent    Shopping  independent       Employment/    Employment Status  employed part-time        Psychosocial     Row Name 07/15/21 1531       Intellectual Performance WDL    Level of Consciousness  Alert       Coping/Stress    Patient Personal Strengths  positive attitude;strong support system    Sources of Support  spouse       Developmental Stage (Eriksson's)    Developmental Stage  Stage 7 (35-65 years/Middle Adulthood) Generativity vs. Stagnation        Abuse/Neglect    No documentation.       Legal    No documentation.       Substance Abuse    No documentation.       Patient Forms    No documentation.           [employer]

## 2021-07-16 VITALS
HEART RATE: 95 BPM | TEMPERATURE: 97.9 F | BODY MASS INDEX: 28.9 KG/M2 | OXYGEN SATURATION: 95 % | SYSTOLIC BLOOD PRESSURE: 146 MMHG | WEIGHT: 195.1 LBS | DIASTOLIC BLOOD PRESSURE: 101 MMHG | RESPIRATION RATE: 16 BRPM | HEIGHT: 69 IN

## 2021-07-16 LAB
ANION GAP SERPL CALCULATED.3IONS-SCNC: 10.2 MMOL/L (ref 5–15)
BUN SERPL-MCNC: 7 MG/DL (ref 6–20)
BUN/CREAT SERPL: 6 (ref 7–25)
CALCIUM SPEC-SCNC: 8.2 MG/DL (ref 8.6–10.5)
CHLORIDE SERPL-SCNC: 96 MMOL/L (ref 98–107)
CO2 SERPL-SCNC: 26.8 MMOL/L (ref 22–29)
CREAT SERPL-MCNC: 1.17 MG/DL (ref 0.76–1.27)
DEPRECATED RDW RBC AUTO: 48 FL (ref 37–54)
ERYTHROCYTE [DISTWIDTH] IN BLOOD BY AUTOMATED COUNT: 14.9 % (ref 12.3–15.4)
GFR SERPL CREATININE-BSD FRML MDRD: 70 ML/MIN/1.73
GLUCOSE SERPL-MCNC: 97 MG/DL (ref 65–99)
HCT VFR BLD AUTO: 49.3 % (ref 37.5–51)
HGB BLD-MCNC: 16.9 G/DL (ref 13–17.7)
MCH RBC QN AUTO: 30.4 PG (ref 26.6–33)
MCHC RBC AUTO-ENTMCNC: 34.3 G/DL (ref 31.5–35.7)
MCV RBC AUTO: 88.7 FL (ref 79–97)
PLATELET # BLD AUTO: 317 10*3/MM3 (ref 140–450)
PMV BLD AUTO: 9.6 FL (ref 6–12)
POTASSIUM SERPL-SCNC: 4 MMOL/L (ref 3.5–5.2)
QT INTERVAL: 360 MS
QTC INTERVAL: 410 MS
RBC # BLD AUTO: 5.56 10*6/MM3 (ref 4.14–5.8)
SODIUM SERPL-SCNC: 133 MMOL/L (ref 136–145)
WBC # BLD AUTO: 16.99 10*3/MM3 (ref 3.4–10.8)

## 2021-07-16 PROCEDURE — 99239 HOSP IP/OBS DSCHRG MGMT >30: CPT | Performed by: INTERNAL MEDICINE

## 2021-07-16 PROCEDURE — 99232 SBSQ HOSP IP/OBS MODERATE 35: CPT | Performed by: INTERNAL MEDICINE

## 2021-07-16 PROCEDURE — 80048 BASIC METABOLIC PNL TOTAL CA: CPT | Performed by: INTERNAL MEDICINE

## 2021-07-16 PROCEDURE — 85027 COMPLETE CBC AUTOMATED: CPT | Performed by: INTERNAL MEDICINE

## 2021-07-16 RX ORDER — CARVEDILOL 12.5 MG/1
12.5 TABLET ORAL 2 TIMES DAILY WITH MEALS
Status: DISCONTINUED | OUTPATIENT
Start: 2021-07-16 | End: 2021-07-16 | Stop reason: HOSPADM

## 2021-07-16 RX ORDER — CARVEDILOL 12.5 MG/1
12.5 TABLET ORAL 2 TIMES DAILY WITH MEALS
Status: DISCONTINUED | OUTPATIENT
Start: 2021-07-16 | End: 2021-07-16

## 2021-07-16 RX ORDER — CARVEDILOL 12.5 MG/1
12.5 TABLET ORAL 2 TIMES DAILY WITH MEALS
Qty: 60 TABLET | Refills: 3 | Status: SHIPPED | OUTPATIENT
Start: 2021-07-16 | End: 2021-12-21

## 2021-07-16 RX ORDER — LISINOPRIL 5 MG/1
5 TABLET ORAL
Status: DISCONTINUED | OUTPATIENT
Start: 2021-07-16 | End: 2021-07-16 | Stop reason: HOSPADM

## 2021-07-16 RX ORDER — CLOPIDOGREL BISULFATE 75 MG/1
75 TABLET ORAL DAILY
Qty: 90 TABLET | Refills: 3 | Status: SHIPPED | OUTPATIENT
Start: 2021-07-17 | End: 2022-07-06

## 2021-07-16 RX ORDER — ATORVASTATIN CALCIUM 80 MG/1
80 TABLET, FILM COATED ORAL NIGHTLY
Qty: 90 TABLET | Refills: 3 | Status: SHIPPED | OUTPATIENT
Start: 2021-07-16 | End: 2022-09-26

## 2021-07-16 RX ADMIN — FAMOTIDINE 20 MG: 20 TABLET ORAL at 06:27

## 2021-07-16 RX ADMIN — CITALOPRAM HYDROBROMIDE 20 MG: 20 TABLET ORAL at 08:21

## 2021-07-16 RX ADMIN — CLOPIDOGREL BISULFATE 75 MG: 75 TABLET ORAL at 08:20

## 2021-07-16 RX ADMIN — METOPROLOL TARTRATE 25 MG: 25 TABLET, FILM COATED ORAL at 08:20

## 2021-07-16 RX ADMIN — ASPIRIN 81 MG: 81 TABLET, COATED ORAL at 08:20

## 2021-07-16 RX ADMIN — LISINOPRIL 5 MG: 5 TABLET ORAL at 10:23

## 2021-07-16 RX ADMIN — CLONAZEPAM 0.5 MG: 0.5 TABLET ORAL at 08:21

## 2021-07-16 RX ADMIN — FAMOTIDINE 20 MG: 20 TABLET ORAL at 06:28

## 2021-07-16 RX ADMIN — ACETAMINOPHEN 650 MG: 325 TABLET ORAL at 10:08

## 2021-07-16 RX ADMIN — FAMOTIDINE 20 MG: 20 TABLET ORAL at 08:19

## 2021-07-16 NOTE — CASE MANAGEMENT/SOCIAL WORK
Case Management Discharge Note                Selected Continued Care - Admitted Since 7/14/2021     Destination    No services have been selected for the patient.              Durable Medical Equipment    No services have been selected for the patient.              Dialysis/Infusion    No services have been selected for the patient.              Home Medical Care    No services have been selected for the patient.              Therapy    No services have been selected for the patient.              Community Resources    No services have been selected for the patient.              [facility]    No services have been selected for the patient.                  Transportation Services  Private: Car    Final Discharge Disposition Code: 01 - home or self-care

## 2021-07-16 NOTE — NURSING NOTE
Pt taken with personal belongings in wheelchair to POV. Girlfriend and Mother accompanied patient and orderly.

## 2021-07-16 NOTE — PROGRESS NOTES
[facility]    [facility]    Name:  Girma Chapman   Age:  40 y.o.  Sex:  male  :  1980  MRN:  [id number]   Visit Number:  [id number]  Admission Date:  2021  Date Of Service:  21  Primary Care Physician:  Girma Arambula DO     LOS: 2 days :    Chief Complaint:      Follow-up of STEMI.    Subjective:    Mr. Chapman was seen and examined this morning.  He is currently lying down on the bed and is comfortable at rest.  He had some hiccups and belching yesterday but it is better today.  His renal function has significantly improved with IV hydration.  He denies any chest pain or shortness of breath.  He was seen by Dr. Grady this morning and he is planning to discharge him home today.    Review of Systems:     All systems were reviewed and negative except as mentioned in subjective, assessment and plan.    Vital Signs:    Temp:  [97.9 °F (36.6 °C)-99.8 °F (37.7 °C)] 97.9 °F (36.6 °C)  Heart Rate:  [] 81  Resp:  [16-20] 20  BP: (119-166)/() 151/112    Intake and output:    I/O last 3 completed shifts:  In: 5401.1 [P.O.:965; I.V.:4436.1]  Out: 3825 [Urine:3825]  I/O this shift:  In: 240 [P.O.:240]  Out: 1350 [Urine:1350]    Physical Examination:    General Appearance:  Alert and cooperative.   Head:  Atraumatic and normocephalic.   Eyes: Conjunctivae and sclerae normal, no icterus. No pallor.   Throat: No oral lesions, no thrush, oral mucosa moist.   Neck: Supple, trachea midline, no thyromegaly.   Lungs:   Breath sounds heard bilaterally equally.  No crackles or wheezing. No Pleural rub or bronchial breathing.   Heart:  Normal S1 and S2, no murmur, no gallop, no rub. No JVD.   Abdomen:   Normal bowel sounds, no masses, no organomegaly. Soft, nontender, nondistended, no rebound tenderness.   Extremities: Supple, no edema, no cyanosis, no clubbing.   Skin: No bleeding or rash.  Multiple tattoos are noted throughout the body.   Neurologic: Alert and oriented x  3. No facial asymmetry. Moves all four limbs. No tremors.      Laboratory results:    Results from last 7 days   Lab Units 07/16/21  0421 07/15/21  0419 07/14/21  2011 07/12/21  0950   SODIUM mmol/L 133* 136 136 136   POTASSIUM mmol/L 4.0 4.3 3.7 5.0   CHLORIDE mmol/L 96* 99 90* 97*   CO2 mmol/L 26.8 24.0 7.2* 24.5   BUN mg/dL 7 8 10 7   CREATININE mg/dL 1.17 1.26 1.72* 1.37*   CALCIUM mg/dL 8.2* 7.9* 9.9 9.4   BILIRUBIN mg/dL  --   --  0.7 0.7   ALK PHOS U/L  --   --  78 57   ALT (SGPT) U/L  --   --  31 26   AST (SGOT) U/L  --   --  58* 32   GLUCOSE mg/dL 97 103* 126* 105*     Results from last 7 days   Lab Units 07/16/21  0421 07/15/21  0419 07/14/21  2011   WBC 10*3/mm3 16.99* 27.73* 24.03*   HEMOGLOBIN g/dL 16.9 17.6 19.4*   HEMATOCRIT % 49.3 50.7 60.5*   PLATELETS 10*3/mm3 317 384 487*         Results from last 7 days   Lab Units 07/14/21 2011   TROPONIN T ng/mL 0.213*     I have reviewed the patient's laboratory results.    Radiology results:    Adult Transthoracic Echo Complete W/ Cont if Necessary Per Protocol    Result Date: 7/15/2021  · Left ventricular wall thickness is consistent with mild to moderate concentric hypertrophy. · Estimated left ventricular EF = 55% Left ventricular ejection fraction appears to be 51 - 55%. Left ventricular systolic function is normal. · Left ventricular diastolic function was normal. · Estimated right ventricular systolic pressure from tricuspid regurgitation is normal (<35 mmHg).      Cardiac Catheterization/Vascular Study    Result Date: 7/16/2021   [facility] Cardiology Cath Report Name: Girma Chapman YOB: 1980 MRN #: [id number] Procedure Date: 7/14/2021 Procedures Performed: 1. Ultrasound guided right radial artery access 2. Left and right selective coronary angiography 3. Left heart catheterization 4. Percutaneous coronary intervention of the proximal to mid RCA 5. OCT and IVUS assessment of the proximal to mid RCA Performing Physician: JEAN CLAUDE  "Caio Grady MD Primary Indication: Inferior STEMI with delayed presentation Postoperative Diagnosis: Severe one-vessel coronary artery disease Brief History: Mr. Chapman is a 40-year-old male who presented to the [facility] on 6/14/2021 for evaluation of a 5-day duration of nausea, vomiting, and mild substernal chest discomfort.  Upon initial evaluation, an ECG showed ST elevation in inferior leads associated with Q waves.  He additionally had hyperacute T waves in the precordial leads.  The patient had recurrent chest discomfort described as \"heartburn\" and a repeat ECG showed worsening ST elevation with mild reciprocal changes.  A troponin was found to be mildly elevated.  A code STEMI was activated, the patient was taken to the Cath Lab for emergent coronary angiography with online PCI as indicated. Consent: The indications for the procedure, possible outcomes, risks, benefits, and alternate treatments were discussed with the patient. Potential risks include but are not limited to stroke, abnormal heart rhythms, serious vascular injury requiring vascular surgery, excessive bleeding requiring transfusion, infection, kidney injury including possible renal failure, death, and serious coronary complications including perforation, dissection, or occlusion which could require emergency cardiac surgery. The risks of sedation were also discussed, including possible adverse reaction to sedation and respiratory failure requiring intubation. If PCI is required, there is a 1-2% risk of major complication. All questions were answered, and the patient was agreeable to proceed with coronary angiography with online PCI as indicated. Procedure Details: The patient was brought to the cath lab in a fasting state. Verification of the patient's identity was completed using two identifiers. The patient was then placed on the cath lab table, and was prepped and draped in a sterile fashion. A \"time out\" was then " "preformed with the physician and all team members present. Versed and Fentanyl was then administered for conscious sedation. Once adequate sedation was achieved, the [right wrist] was anesthetized with 1% Lidocaine. The right radial artery was then accessed with a micropuncture needle under ultrasound guidance via the modified Seldinger technique. A 6 F sheath was then placed in the right radial artery. An 0.035\" J wire was then advanced into the aortic root.  A 6 F JL 3.5 catheter was used to perform selective left coronary angiography. A 6 F JR4 guide catheter was used to perform selective right coronary angiography.  Following diagnostic angiography, we then proceeded with PCI to the proximal to mid RCA (see intervention details below).  Following percutaneous coronary mention, a 5 Welsh pigtail catheter was used to perform left ventricular manometry as well as LV-AO pullback.  Upon completion of diagnostic angiography, all associated catheters and wires were removed. Hemostasis was then achieved with a TR band inflated with 14 cc. The patient tolerated the procedure well with no immediate jada-procedural complications, and was transferred to recovery for completion of bedrest. Diagnostic Coronary Angiography: 1. Left Main - The left main coronary artery is a normal caliber trifurcating vessel which gives off the left anterior descending and left circumflex arteries as well as a ramus intermedius branch.  There are mild luminal irregularities in the mid portion of the left main. 1a. Ramus Intermedius - The ramus is a large caliber vessel which originates from the left main.  There is 30-40% stenosis in the ostium of the ramus, with mild luminal irregularities distally. 2. Left Anterior Descending - The LAD is a normal caliber vessel which originates from the left main and extends to the apex. The LAD provides 1 large, bifurcating diagonal branch.  There are mild luminal irregularities in the LAD and diagonal " "branch. 3. Left Circumflex - The LCx is a normal caliber vessel which originates from the left main. The LCx gives off one obtuse marginal branches before continuation in the AV groove.  There is a tubular 40-50% stenosis in the mid LCx.  There are mild luminal irregularities in the OM. 4. Right Coronary Artery - The RCA is a large, dominant vessel which originates in the right cusp and gives off the RPDA as well as 2 PL branches.  There is severe thrombotic 90% stenosis in the proximal to mid RCA with no significant disease distally. 5. Left Heart Catheterization - not performed due to the patient being restless resulting in removal of the right radial sheath. Hemodynamics: 1. Aorta -151/105 mmHg, mean 128 mmHg  Percutaneous Coronary Intervention: The 90% lesion identified in the proximal to mid right coronary artery was a thrombotic, type B2 lesion with JENNIFER 3 flow distally.  Prior to intervention, Aspirin was given for anti-platelet therapy, and Angiomax was used during the case to achieve a therapeutic level of anticoagulation.  A 6 Fr JR4 guide catheter was used to engage the right coronary artery, and a .014\" BMW guidewire was advanced across the lesion.  Lesion was pre-dilated with a 3.0 x 12 mm compliant Trek balloon.  The Hibernaonfly OCT catheter was then advanced into the proximal to mid RCA and OCT assessment was performed.  Then a 4.0 x 18 mm Xience drug-eluting stent was deployed at 12 DANIEL.  This was then post-dilated with a 4.5 x 12 mm NC balloon to 16 DANIEL.  The OCT catheter was then reintroduced, however due to tortuosity in the proximal RCA was unable to be fully advanced into the RCA.  The OCT catheter was removed, and the IVUS Hardy eye catheter was then advanced into the mid RCA and used to perform IVUS assessment of the stent revealing adequate expansion and apposition without dissection or complication.  Final angiography thereafter showed adequate stent expansion, and no evidence of complication " including edge dissection, perforation, distal embolization, or side branch compromise.  Residual stenosis 0%, with JENNIFER-3 flow distally.  Total Contrast: 160 mL Flouro Time / Radiation Dose: 12.6 min /767 mGy Estimated blood loss: Minimal Conclusion: 1.  Severe thrombotic stenosis of the proximal to mid RCA as culprit for inferior STEMI (significantly delayed presentation)  -Successful PCI of the proximal to mid RCA with placement of a 4.0 x 18 mm Xience CHRISTOPHE postdilated to approximately 4.7 mm proximally 2.  Moderate stenosis of the mid LCx 3.  Mild stenosis of the ostial ramus Recommendations: 1.  Loaded with aspirin and Plavix, continue DAPT uninterrupted for 1 year 2.  Start high intensity statin 3.  Start beta-blocker and restart home lisinopril when renal function improved Farhad Grady MD [unfilled] 09:36 EDT     I have reviewed the patient's radiology reports.    Medication Review:     I have reviewed the patient's active and prn medications.     Problem List:      ST elevation myocardial infarction (STEMI) (CMS/[facility])    Acute renal failure (ARF) (CMS/[facility])    Assessment:    1. Acute inferior wall STEMI, POA.  2. Acute renal failure, POA, improving.  3. Leukocytosis likely related to myocardial infarction, POA, improving.  4. History of CVA.    Plan:    Mr. Chapman is currently hemodynamically stable and his renal function and leukocytosis have improved.  He has been afebrile throughout the hospital stay.  His blood pressure is still on the higher side but he will be continued on his home medication of lisinopril and the new medication carvedilol.  He is also being discharged on dual antiplatelet agents.  I have strongly advised the patient to follow-up with Dr. Grady as an outpatient as well as primary care physician.  Discussed with nursing staff at the bedside. Discussed with Dr. Grady.    DVT Prophylaxis: SCDs  Code Status: Full  Diet: Cardiac    Bryant Davis MD  07/16/21  12:30 EDT    Dictated utilizing Dragon  dictation.

## 2021-07-16 NOTE — PLAN OF CARE
Goal Outcome Evaluation:    Pt has rested well this shift. No acute events overnight. Nitro gtt continues to infuse per parameters. No complaints of chest pain or discomfort.

## 2021-07-16 NOTE — PLAN OF CARE
Goal Outcome Evaluation:  Plan of Care Reviewed With: patient, significant other        Progress: improving  Outcome Summary: Pt ready for discharge home.  New meds information printed and reviewed (for home). Call to [facility] SO afraid to take patient home. Dr to come to bedside ASAP. I reviewed with pt and SO new BP meds. Pharmacy to bring to bedside.

## 2021-07-16 NOTE — DISCHARGE SUMMARY
[facility] Cardiology Discharge Summary     Girma Chapman  1980  [id number]    Admission Date: 7/14/2021  Discharge Date: 07/16/21    Primary Discharge Diagnosis:   1.  Inferior STEMI with delayed presentation    Secondary Discharge Diagnosis:   1.  Acute kidney injury  2.  Leukocytosis  3.  Hypertension  4.  Chronic cannabis use    Consults:   Consults     Date and Time Order Name Status Description    7/14/2021 11:46 PM Inpatient Hospitalist Consult Completed           Day of Discharge Exam:  Subjective: 40-year-old male seen in follow-up following PCI to the RCA after presenting with a late sensation inferior STEMI.  No acute overnight events.  No recurrent chest pain or anginal symptoms.  Anticipating discharge home today.    Vital Signs:  Temp:  [97.4 °F (36.3 °C)-99.8 °F (37.7 °C)] 98.4 °F (36.9 °C)  Heart Rate:  [] 89  Resp:  [16-20] 20  BP: (114-168)/() 156/101    Physical Exam  Vitals and nursing note reviewed.   Constitutional:       General: He is not in acute distress.     Appearance: Normal appearance. He is not diaphoretic.   HENT:      Head: Normocephalic and atraumatic.   Cardiovascular:      Rate and Rhythm: Normal rate and regular rhythm.      Heart sounds: No murmur heard.        Comments: 2+ right radial pulse  Pulmonary:      Effort: Pulmonary effort is normal. No respiratory distress.      Breath sounds: Normal breath sounds. No stridor. No wheezing, rhonchi or rales.   Abdominal:      General: Bowel sounds are normal. There is no distension.      Palpations: Abdomen is soft.      Tenderness: There is no abdominal tenderness. There is no guarding or rebound.   Musculoskeletal:         General: No swelling. Normal range of motion.   Skin:     General: Skin is warm and dry.   Neurological:      General: No focal deficit present.      Mental Status: He is alert and oriented to person, place, and time.   Psychiatric:         Mood and Affect: Mood normal.          "Behavior: Behavior normal.         Hospital Course:   Mr. Chapman is a 40-year-old male who presented to the [facility] on 7/14/2021 for evaluation of nausea, diaphoresis, and \"heartburn\" which had been ongoing for approximately 5 days prior to presentation.  The patient did present to the emergency department on 7/12 for evaluation, however no EKG or troponin was performed and the patient was discharged home with suspected cyclical vomiting syndrome in the setting of chronic cannabis use.  Given ongoing symptoms, he again presented on 7/14 and an ECG at that time showed ST elevation in the inferior leads associated with inferior Q waves and hyperdynamic T waves in the precordial leads.  While in the emergency department, the patient did have recurrent nausea as well as chest discomfort, and a repeat ECG showed worsening ST elevation in the inferior leads with developing reciprocal changes.  A code STEMI was activated, and the patient was taken to the Cath Lab where he was found to have severe thrombotic stenosis of the proximal to mid RCA.  He underwent PCI with a CHRISTOPHE x1, and tolerated the procedure well.  Despite intervention, he had persistent ST elevation and Q waves in the inferior leads.  The patient did have resolution of his chest pain and nausea following PCI.  He was loaded on dual antiplatelet therapy with aspirin and Plavix, in addition to guideline medical therapy.  A post MI echocardiogram showed preserved LV systolic function.  Presentation, the patient was also noted to have an acute kidney injury with a creatinine of 1.7, and leukocytosis which was suspected to be reactive in etiology.  With IV fluids, the patient had normalization of his renal function as well as downtrending WBC count on the day of discharge.  The patient did not have any significant arrhythmias or complications during his 48-hour hospital stay.  He was subsequently discharged home with instructions to follow-up " in the cardiology clinic in 2 weeks for further management.      Procedures Performed  1.  Coronary angiography 7/14/2021   1.  Severe thrombotic stenosis of the proximal to mid RCA     -Successful PCI with placement of CHRISTOPHE x1 to the proximal to mid RCA   2.  Nonobstructive disease in left coronary system    Pertinent Test Results:   1.  Echocardiogram 7/15/2021   1.  Normal left ventricular size and low normal LV systolic function, LVEF 50-55%   2.  Mild to moderate concentric LVH   3.  No significant valvular abnormalities    Condition on Discharge: Stable for discharge home    Discharge Disposition  Home or Self Care    Discharge Medications     Discharge Medications      New Medications      Instructions Start Date   atorvastatin 80 MG tablet  Commonly known as: LIPITOR   80 mg, Oral, Nightly      carvedilol 12.5 MG tablet  Commonly known as: COREG   12.5 mg, Oral, 2 Times Daily With Meals      clopidogrel 75 MG tablet  Commonly known as: PLAVIX   75 mg, Oral, Daily   Start Date: July 17, 2021        Continue These Medications      Instructions Start Date   aspirin 81 MG EC tablet   81 mg, Oral, Daily      citalopram 20 MG tablet  Commonly known as: CeleXA   20 mg, Oral, Daily      dicyclomine 20 MG tablet  Commonly known as: BENTYL   20 mg, Oral, Every 6 Hours PRN      lisinopril 10 MG tablet  Commonly known as: PRINIVIL,ZESTRIL   10 mg, Oral, Daily      ondansetron ODT 4 MG disintegrating tablet  Commonly known as: ZOFRAN-ODT   4 mg, Translingual, Every 6 Hours PRN             Discharge Diet: Cardiac diet    Activity at Discharge: Standard right radial precautions    Follow-up Appointments  No future appointments.  Additional Instructions for the Follow-ups that You Need to Schedule     Ambulatory Referral to Cardiac Rehab   As directed            Test Results Pending at Discharge: None       Bianca Grady MD  Interventional Cardiology   07/16/21  08:41 EDT    Time: Discharge 55 min    Please note that  portions of this note may have been completed with a voice recognition program. Efforts were made to edit the dictations, but occasionally words are mistranscribed.

## 2021-07-17 ENCOUNTER — READMISSION MANAGEMENT (OUTPATIENT)
Dept: CALL CENTER | Facility: HOSPITAL | Age: 41
End: 2021-07-17

## 2021-07-17 NOTE — OUTREACH NOTE
Prep Survey      Responses   [facility] from?  [facility]   Is LACE score < 7 ?  No   Emergency Room discharge w/ pulse ox?  No   Eligibility  Readm Mgmt   Discharge diagnosis  Inferior STEMI with delayed presentation   Does the patient have one of the following disease processes/diagnoses(primary or secondary)?  Acute MI (STEMI,NSTEMI)   Does the patient have Home health ordered?  No   Is there a DME ordered?  No   Prep survey completed?  Yes          Usha Salgado RN

## 2021-07-22 ENCOUNTER — TELEPHONE (OUTPATIENT)
Dept: CARDIOLOGY | Facility: CLINIC | Age: 41
End: 2021-07-22

## 2021-07-22 ENCOUNTER — READMISSION MANAGEMENT (OUTPATIENT)
Dept: CALL CENTER | Facility: HOSPITAL | Age: 41
End: 2021-07-22

## 2021-07-22 NOTE — TELEPHONE ENCOUNTER
Jannet Mabry LPN   DURING DISCHARGE FOLLOW UP CALL, PATIENT WAS ASKING WHEN HE CAN RETURN TO WORK, AND IF HE WILL HAVE ANY LIFTING OR JOB RESTRICTIONS. COULD SOMEONE FROM THE OFFICE PLEASE REACH OUT TO PATIENT TO DISCUSS THIS. HE HAS AN APPOINTMENT WITH DR. GALICIA ON 8/6/21.    Please review and advise.

## 2021-07-22 NOTE — OUTREACH NOTE
AMI Week 1 Survey      Responses   [facility] patient discharged from?  [facility]   Does the patient have one of the following disease processes/diagnoses(primary or secondary)?  Acute MI (STEMI,NSTEMI)   Week 1 attempt successful?  Yes   Call start time  0848   Call end time  0852   Discharge diagnosis  Inferior STEMI with delayed presentation   Meds reviewed with patient/caregiver?  Yes   Is the patient having any side effects they believe may be caused by any medication additions or changes?  No   Does the patient have all prescriptions related to this admission filled (includes statins,anticoagulants,HTN meds,anti-arrhythmia meds)  Yes   Is the patient taking all medications as directed (includes completed medication regime)?  Yes   Does the patient have a primary care provider?   Yes   Does the patient have an appointment with their PCP,cardiologist,or clinic within 7 days of discharge?  Greater than 7 days   Comments regarding PCP  CARDIOLOGY APPOINTMENT IS 8/6/21   What is preventing the patient from scheduling follow up appointments within 7 days of discharge?  Earlier appointment not available   Nursing Interventions  Verified appointment date/time/provider   Has the patient kept scheduled appointments due by today?  N/A   Has home health visited the patient within 72 hours of discharge?  N/A   Psychosocial issues?  No   Did the patient receive a copy of their discharge instructions?  Yes   Nursing interventions  Reviewed instructions with patient   What is the patient's perception of their health status since discharge?  Improving   Nursing interventions  Nurse provided patient education   Is the patient/caregiver able to teach back signs and symptoms of when to call for help immediately:  Sudden chest discomfort   Nursing interventions  Nurse provided patient education   Is the pateint /caregiver able to teach back the importance of cardiac rehab?  Yes   Nursing interventions  Provided education on  importance of cardiac rehab   Is the patient/caregiver able to teach back lifestyle changes to help prevent MIs  Regular exercise as approved by provider, Heart healthy diet, Maintaining a healthy weight, Reducing stress   Is the patient/caregiver able to teach back ways to prevent a second heart attack:  Take medications, Follow up with MD, Participate in Cardiac Rehab, Manage risk factors   If the patient is a current smoker, are they able to teach back resources for cessation?  Not a smoker   Is the patient/caregiver able to teach back the hierarchy of who to call/visit for symptoms/problems? PCP, Specialist, Home health nurse, Urgent Care, ED, 911  Yes   Week 1 call completed?  Yes          Jannet Mabry LPN

## 2021-08-02 ENCOUNTER — READMISSION MANAGEMENT (OUTPATIENT)
Dept: CALL CENTER | Facility: HOSPITAL | Age: 41
End: 2021-08-02

## 2021-08-02 NOTE — OUTREACH NOTE
AMI Week 2 Survey      Responses   [facility] patient discharged from?  [facility]   Does the patient have one of the following disease processes/diagnoses(primary or secondary)?  Acute MI (STEMI,NSTEMI)   Week 2 attempt successful?  No   Unsuccessful attempts  Attempt 1          Otilia Morelos RN

## 2021-08-05 ENCOUNTER — READMISSION MANAGEMENT (OUTPATIENT)
Dept: CALL CENTER | Facility: HOSPITAL | Age: 41
End: 2021-08-05

## 2021-08-05 NOTE — OUTREACH NOTE
AMI Week 2 Survey      Responses   [facility] patient discharged from?  [facility]   Does the patient have one of the following disease processes/diagnoses(primary or secondary)?  Acute MI (STEMI,NSTEMI)   Week 2 attempt successful?  No   Unsuccessful attempts  Attempt 2          Walt Hirsch RN

## 2021-08-06 ENCOUNTER — OFFICE VISIT (OUTPATIENT)
Dept: CARDIOLOGY | Facility: CLINIC | Age: 41
End: 2021-08-06

## 2021-08-06 VITALS
WEIGHT: 196 LBS | HEIGHT: 69 IN | SYSTOLIC BLOOD PRESSURE: 100 MMHG | BODY MASS INDEX: 29.03 KG/M2 | DIASTOLIC BLOOD PRESSURE: 70 MMHG | OXYGEN SATURATION: 96 % | HEART RATE: 79 BPM

## 2021-08-06 DIAGNOSIS — I10 ESSENTIAL HYPERTENSION: ICD-10-CM

## 2021-08-06 DIAGNOSIS — I25.810 CORONARY ARTERY DISEASE INVOLVING CORONARY BYPASS GRAFT OF NATIVE HEART WITHOUT ANGINA PECTORIS: Primary | ICD-10-CM

## 2021-08-06 PROBLEM — N17.9 ACUTE RENAL FAILURE (ARF): Status: RESOLVED | Noted: 2021-07-15 | Resolved: 2021-08-06

## 2021-08-06 PROCEDURE — 99214 OFFICE O/P EST MOD 30 MIN: CPT | Performed by: INTERNAL MEDICINE

## 2021-08-06 RX ORDER — HYDROXYZINE HYDROCHLORIDE 10 MG/1
TABLET, FILM COATED ORAL
COMMUNITY
Start: 2021-07-20 | End: 2021-10-01

## 2021-08-06 RX ORDER — CITALOPRAM 20 MG/1
20 TABLET ORAL DAILY
Qty: 30 TABLET | Refills: 1 | Status: SHIPPED | OUTPATIENT
Start: 2021-08-06 | End: 2021-09-01

## 2021-08-06 NOTE — PROGRESS NOTES
[facility] Cardiology Office Follow Up Note    Girma Chapman  [id number]  2021    Primary Care Provider: Girma Arambula DO    Chief Complaint: Hospital follow-up    History of Present Illness:   Mr. Girma Chapman is a 41 y.o. male who presents to the Cardiology Clinic for hospital follow-up.  The patient has a past medical history significant for hypertension, possible prior CVA, and chronic cannabis use.  He has a past cardiac history significant for presentation with an inferior STEMI in , undergoing PCI to the proximal RCA at that time.  He had mild to moderate residual stenosis in the LCx.  A post MI echocardiogram showed a low normal LVEF of 50-55%.  He presents today for hospital follow-up.  Since discharge, the patient reports he is generally done well.  He has not had any recurrent chest pain or anginal symptoms.  He does report mild exertional dyspnea, which he believes may partially be related to anxiety following his hospitalization.  He is planning to start cardiac rehab in the near future.  He reports tolerance of his cardiac medications including DAPT with no missed doses.  No orthopnea, PND, or lower extremity swelling.  He denies any significant pain or difficulty at the right radial access site.  No other specific complaints today.    Past Cardiac Testin. Last Coronary Angio: 2021   1.  Severe thrombotic stenosis of the proximal to mid RCA as culprit for inferior STEMI (significantly delayed presentation)                 -Successful PCI of the proximal to mid RCA with placement of a 4.0 x 18 mm Xience CHRISTOPHE postdilated to approximately 4.7 mm proximally   2.  Moderate stenosis of the mid LCx   3.  Mild stenosis of the ostial ramus  2. Prior Stress Testing: None  3. Last Echo: 7/15/2021   1.  LVEF 50-55%   2.  Normal diastolic function   3.  Mild to moderate concentric LVH  4. Prior Holter Monitor: None    Review of Systems:   Review of Systems    Constitutional: Negative for activity change, appetite change, chills, diaphoresis, fatigue, fever, unexpected weight gain and unexpected weight loss.   Eyes: Negative for blurred vision and double vision.   Respiratory: Positive for shortness of breath. Negative for cough, chest tightness and wheezing.    Cardiovascular: Negative for chest pain, palpitations and leg swelling.   Gastrointestinal: Negative for abdominal pain, anal bleeding, blood in stool and GERD.   Endocrine: Negative for cold intolerance and heat intolerance.   Genitourinary: Negative for hematuria.   Neurological: Negative for dizziness, syncope, weakness and light-headedness.   Hematological: Does not bruise/bleed easily.   Psychiatric/Behavioral: Negative for depressed mood and stress. The patient is not nervous/anxious.        I have reviewed and/or updated the patient's past medical, past surgical, family, social history, problem list and allergies as appropriate.     Medications:     Current Outpatient Medications:   •  aspirin 81 MG EC tablet, Take 81 mg by mouth Daily., Disp: , Rfl:   •  atorvastatin (LIPITOR) 80 MG tablet, Take 1 tablet by mouth Every Night., Disp: 90 tablet, Rfl: 3  •  carvedilol (COREG) 12.5 MG tablet, Take 1 tablet by mouth 2 (Two) Times a Day With Meals., Disp: 60 tablet, Rfl: 3  •  citalopram (CeleXA) 20 MG tablet, Take 1 tablet by mouth Daily., Disp: 30 tablet, Rfl: 1  •  clopidogrel (PLAVIX) 75 MG tablet, Take 1 tablet by mouth Daily., Disp: 90 tablet, Rfl: 3  •  dicyclomine (BENTYL) 20 MG tablet, Take 1 tablet by mouth Every 6 (Six) Hours As Needed (Abdominal cramping)., Disp: 12 tablet, Rfl: 0  •  hydrOXYzine (ATARAX) 10 MG tablet, TAKE 1 TO 2 TABLETS BY MOUTH EVERY 4 TO 6 HOURS AS NEEDED FOR ANXIETY, Disp: , Rfl:   •  lisinopril (PRINIVIL,ZESTRIL) 10 MG tablet, Take 10 mg by mouth Daily., Disp: , Rfl:   •  ondansetron ODT (ZOFRAN-ODT) 4 MG disintegrating tablet, Place 1 tablet on the tongue Every 6 (Six) Hours  "As Needed for Nausea or Vomiting., Disp: 14 tablet, Rfl: 0    Physical Exam:  Vital Signs:   Vitals:    08/06/21 1015   BP: 100/70   BP Location: Right arm   Patient Position: Sitting   Cuff Size: Adult   Pulse: 79   SpO2: 96%   Weight: 88.9 kg (196 lb)   Height: 175.3 cm (69\")       Physical Exam  Constitutional:       General: He is not in acute distress.     Appearance: Normal appearance. He is well-developed. He is not diaphoretic.   HENT:      Head: Normocephalic and atraumatic.   Eyes:      General: No scleral icterus.     Pupils: Pupils are equal, round, and reactive to light.   Neck:      Trachea: No tracheal deviation.   Cardiovascular:      Rate and Rhythm: Normal rate and regular rhythm.      Heart sounds: Normal heart sounds. No murmur heard.   No friction rub. No gallop.       Comments: Normal JVD.  2+ right radial pulse  Pulmonary:      Effort: Pulmonary effort is normal. No respiratory distress.      Breath sounds: Normal breath sounds. No stridor. No wheezing or rales.   Chest:      Chest wall: No tenderness.   Abdominal:      General: Bowel sounds are normal. There is no distension.      Palpations: Abdomen is soft.      Tenderness: There is no abdominal tenderness. There is no guarding or rebound.   Musculoskeletal:         General: No swelling. Normal range of motion.      Cervical back: Neck supple.   Lymphadenopathy:      Cervical: No cervical adenopathy.   Skin:     General: Skin is warm and dry.      Findings: No erythema.   Neurological:      General: No focal deficit present.      Mental Status: He is alert and oriented to person, place, and time.   Psychiatric:         Mood and Affect: Mood normal.         Behavior: Behavior normal.         Results Review:   I reviewed the patient's new clinical results.      Assessment / Plan:     1.  Coronary artery disease  --Delayed presentation with an inferior STEMI in 7/21, underwent PCI x1 to the proximal RCA  --Mild to moderate residual stenosis in " the LCx  --Currently doing well without chest pain or exertional anginal symptoms  --Continue DAPT with aspirin and Plavix uninterrupted through 7/22, then aspirin monotherapy  --Continue metoprolol and lisinopril  --Continue high intensity statin  --Planning to participate in cardiac rehab  --Follow-up in 3 months, or sooner if required    2.  Hypertension  --Well-controlled with current antihypertensives      Follow Up:   Return in about 3 months (around 11/6/2021).      Thank you for allowing me to participate in the care of your patient. Please to not hesitate to contact me with additional questions or concerns.     JEAN CLAUDE Grady MD  Interventional Cardiology   08/06/2021  10:19 EDT

## 2021-09-01 ENCOUNTER — OFFICE VISIT (OUTPATIENT)
Dept: PSYCHIATRY | Facility: CLINIC | Age: 41
End: 2021-09-01

## 2021-09-01 VITALS
BODY MASS INDEX: 29.18 KG/M2 | DIASTOLIC BLOOD PRESSURE: 70 MMHG | HEIGHT: 69 IN | WEIGHT: 197 LBS | SYSTOLIC BLOOD PRESSURE: 108 MMHG | HEART RATE: 60 BPM

## 2021-09-01 DIAGNOSIS — F41.1 GENERALIZED ANXIETY DISORDER: Chronic | ICD-10-CM

## 2021-09-01 DIAGNOSIS — F33.2 SEVERE EPISODE OF RECURRENT MAJOR DEPRESSIVE DISORDER, WITHOUT PSYCHOTIC FEATURES (HCC): Primary | Chronic | ICD-10-CM

## 2021-09-01 PROCEDURE — 90792 PSYCH DIAG EVAL W/MED SRVCS: CPT | Performed by: NURSE PRACTITIONER

## 2021-09-01 RX ORDER — SERTRALINE HYDROCHLORIDE 25 MG/1
25 TABLET, FILM COATED ORAL DAILY
Qty: 7 TABLET | Refills: 0 | Status: SHIPPED | OUTPATIENT
Start: 2021-09-01 | End: 2021-10-01

## 2021-09-01 RX ORDER — LEVOTHYROXINE, LIOTHYRONINE 19; 4.5 UG/1; UG/1
30 TABLET ORAL DAILY
COMMUNITY
Start: 2021-08-09 | End: 2022-10-05 | Stop reason: SDUPTHER

## 2021-09-01 RX ORDER — CITALOPRAM 10 MG/1
10 TABLET ORAL DAILY
Qty: 7 TABLET | Refills: 0 | Status: SHIPPED | OUTPATIENT
Start: 2021-09-01 | End: 2021-10-01 | Stop reason: SINTOL

## 2021-09-01 NOTE — PROGRESS NOTES
"Chief Complaint  Anxiety and Depression      Subjective          Girma Chapman presents to [facility] for evaluation for medication management of his anxiety and depression.    History of Present Illness: Patient presents today as a referral from his primary care physician secondary to \"my continuing bilateral with depression\".  Patient is currently taking Celexa 20 mg daily, and reports he has taken the Celexa for approximately 2 years.  He has taken higher doses in the past but \"when the dose goes higher, it makes me too sleepy\".  Patient reports he still experiences some excessive sleepiness from the Celexa at 20 mg.  Patient reports he feels as though he has mood swings and the Celexa \"is not helping it like it was\".  Patient reports his mood swings consist of being very irritable very quickly.  \"I can just get really mad because somebody says something that does not even really matter\".  Patient also reports he goes from being in a very good mood for several days, to then having very deep depressive episodes that can last as much is 2 to 3 days.  Patient reports during these times of good mood, he experiences an elevated mood, and increase in goal driven activities, decreased need for sleep, and that he starts multiple projects without finishing any of them.  Patient reports during his depressive episodes he experiences very low energy, low motivation, lower mood, excessive sleepiness, and excessive eating.  Patient reports some normal times, he sleeps approximately 6 to 7 hours a night, but also tends to take a 2 to 4-hour nap at some point in the afternoon as well.  He denies any issues with appetite under normal circumstances.  Patient has had a very difficult time in the last 2 months.  In addition to having an MI, which required PCI with a CHRISTOPHE, the patient has lost 2 uncles, and had a few people he was close with a discharge die as well.  Patient also reports he is " "struggling heavily with not being able to do the things he used to do before.  Patient reports he possibly had some mental health issues prior to his stroke in 2018, but that was what really brought on the start of him seeking help.  Patient reports after he got  in 2013, \"I just kind of quit caring about things\".  He reports he started abusing steroids and working out excessively, and feels that those years in which he did that potentially lead to his health problems he experiences now.  Patient denies any SI/HI, A/V hallucinations.    Past Psychiatric History: Patient denies any history of psychiatric hospitalizations or suicide attempts.  He endorses self-harm in the past and says \"I used to headbutt myself a lot after my stroke\".  Patient has never taken any psychiatric medication other than those currently prescribed.    Substance Use/Abuse: Patient is an extobacco user in the form of snuff.  He endorses alcohol use approximately 1 time a month, but does not do it very often \"because it just makes me feel terrible when I drink\".  He endorses cannabis use, approximately 1 time a month at this point.  He was using it much heavier in the past, prior to his MI.    Past Medical/Developmental History: Patient has CVA in 2018 and experienced some short-term memory issues, but reports those have improved over time.  He also endorses some left peripheral vision loss.  Patient also had an MI in July 2021.    Family Psychiatric History: Patient's mother suffered from alcohol use disorder, his father suffers from alcohol use disorder and substance use disorder, and he had a paternal cousin with schizophrenia.    Social History: Patient is originally from [place].  He graduated high school in 2000.  After high school he attended [employer] for 2 years and tried going to [employer], \"but it was just too much partying\".  Patient reports he has an associates degree in marketing.  He is not currently " "working, but used to be a .  Patient is not working right now secondary to his recent MI.  Patient was  for 3 years, and  in 2013.  He has one 8-year-old son, who primarily lives with his ex-wife full-time, and he sees him a couple days per week.  Patient reports he has a very poor relationship with his ex-wife which makes seeing his son difficult sometimes.  Patient reports he had a very difficult childhood growing up.  His parents  when he was 13, and he grew up living with both parents, but primarily with his mom.  He reports his parents \"used to party pretty hard and get wild\".  He reports after their divorce, his mom remarried \"and calm down some\".  He reports his father is \"just now starting to calm down a little bit\" but that after getting , his father became involved in marijuana trafficking and did some time in prison.  Patient has no full-blooded siblings, but has a half-brother and a half-sister, who are both much younger than him.      Current Medications:   Current Outpatient Medications   Medication Sig Dispense Refill   • aspirin 81 MG EC tablet Take 81 mg by mouth Daily.     • atorvastatin (LIPITOR) 80 MG tablet Take 1 tablet by mouth Every Night. 90 tablet 3   • carvedilol (COREG) 12.5 MG tablet Take 1 tablet by mouth 2 (Two) Times a Day With Meals. 60 tablet 3   • clopidogrel (PLAVIX) 75 MG tablet Take 1 tablet by mouth Daily. 90 tablet 3   • hydrOXYzine (ATARAX) 10 MG tablet TAKE 1 TO 2 TABLETS BY MOUTH EVERY 4 TO 6 HOURS AS NEEDED FOR ANXIETY     • lisinopril (PRINIVIL,ZESTRIL) 10 MG tablet Take 10 mg by mouth Daily.     • NP Thyroid 30 MG tablet Take 30 mg by mouth Daily.     • citalopram (CeleXA) 10 MG tablet Take 1 tablet by mouth Daily. 7 tablet 0   • dicyclomine (BENTYL) 20 MG tablet Take 1 tablet by mouth Every 6 (Six) Hours As Needed (Abdominal cramping). 12 tablet 0   • sertraline (Zoloft) 25 MG tablet Take 1 tablet by mouth Daily. 7 tablet 0 " "  • sertraline (Zoloft) 50 MG tablet Take 1 tablet by mouth Daily. 30 tablet 2     No current facility-administered medications for this visit.       Mental Status Exam:   Hygiene:   good  Cooperation:  Cooperative  Eye Contact:  Good  Psychomotor Behavior:  Appropriate  Affect:  Appropriate  Mood: depressed and anxious  Speech:  Normal  Thought Process:  Goal directed  Thought Content:  Mood congruent  Suicidal:  None  Homicidal:  None  Hallucinations:  None  Delusion:  None  Memory:  Intact  Orientation:  Person, Place, Time and Situation  Reliability:  good  Insight:  Good  Judgement:  Good  Impulse Control:  Good  Physical/Medical Issues:  Yes Recovering from recent MI, CVA in 2018     Objective   Vital Signs:   /70   Pulse 60   Ht 175.3 cm (69\")   Wt 89.4 kg (197 lb)   BMI 29.09 kg/m²     Physical Exam  Neurological:      Mental Status: He is oriented to person, place, and time. Mental status is at baseline.      Coordination: Coordination is intact.      Gait: Gait is intact.   Psychiatric:         Behavior: Behavior is cooperative.         Thought Content: Thought content normal.         Cognition and Memory: Cognition and memory normal.         Judgment: Judgment normal.        Result Review :                   Assessment and Plan    Problem List Items Addressed This Visit     None      Visit Diagnoses     Severe episode of recurrent major depressive disorder, without psychotic features (CMS/HCC)  (Chronic)   -  Primary    Relevant Medications    sertraline (Zoloft) 25 MG tablet    sertraline (Zoloft) 50 MG tablet    citalopram (CeleXA) 10 MG tablet    Generalized anxiety disorder  (Chronic)       Relevant Medications    sertraline (Zoloft) 25 MG tablet    sertraline (Zoloft) 50 MG tablet    citalopram (CeleXA) 10 MG tablet          PHQ-9 Score:   PHQ-9 Total Score: 21    Depression Screening:  Patient screened positive for depression based on a PHQ-9 score of 21 on 9/1/2021. Follow-up " recommendations include: Prescribed antidepressant medication treatment, Referral to Mental Health specialist and Suicide Risk Assessment performed.      Tobacco Cessation:  Patient is a former smoker. No tobacco cessation education necessary.      Impression/Plan:  -This my initial interaction with the patient.  Patient presents today as a very pleasant 41-year-old  male.  He presents as a referral from his PCP because of his continued issues with depression and anxiety.  Patient reports he began experiencing depression and anxiety after he had his CVA in 2018.  Patient reports he felt as though he was beginning to finally recover, and then had an MI in July.  Patient reports frustration and continued mood and anxiety related issues secondary to this.  Patient expresses frustration over not being able to do the things he used to be able to do.  Patient has been very physically fit for several years, and participated in body building and weightlifting competitions.  Patient does endorse a history of abusing anabolic steroids, during that time, and feels as though this may have possibly contributed to his current health issues, but has not been able to find out for sure.  Patient has been taking Celexa for approximately 2 years, with varying degrees of efficacy.  Patient does believe it makes him sleepy here, which he struggles dealing with.  Patient would like to try a new medication, possibly 1 that will not make him sleepy.  Because of the patient's recent cardiac issues, advised him we would need to be careful when switching medications, but that I agreed with transitioning from Celexa to a different medication.  I also recommended he initiate talk therapy.  Patient has done talk therapy in the past, but was very limited, saying he thinks he only went 1 or 2 times.  -Made referral to therapy.  -Discontinue Celexa via 10 mg daily x7 days.  -Start Zoloft 25 mg daily x7 days, then increase to 50 mg daily  after.  -Schedule follow-up for 1 month or as needed.    MEDS ORDERED DURING VISIT:  New Medications Ordered This Visit   Medications   • sertraline (Zoloft) 25 MG tablet     Sig: Take 1 tablet by mouth Daily.     Dispense:  7 tablet     Refill:  0   • sertraline (Zoloft) 50 MG tablet     Sig: Take 1 tablet by mouth Daily.     Dispense:  30 tablet     Refill:  2   • citalopram (CeleXA) 10 MG tablet     Sig: Take 1 tablet by mouth Daily.     Dispense:  7 tablet     Refill:  0         Follow Up   Return in about 1 month (around 10/1/2021), or if symptoms worsen or fail to improve, for Next scheduled follow up.  Patient was given instructions and counseling regarding his condition or for health maintenance advice. Please see specific information pulled into the AVS if appropriate.       TREATMENT PLAN/GOALS: Continue supportive psychotherapy efforts and medications as indicated. Treatment and medication options discussed during today's visit. Patient acknowledged and verbally consented to continue with current treatment plan and was educated on the importance of compliance with treatment and follow-up appointments.    MEDICATION ISSUES:  Discussed medication options and treatment plan of prescribed medication as well as the risks, benefits, and side effects including potential falls, possible impaired driving and metabolic adversities among others. Patient is agreeable to call the office with any worsening of symptoms or onset of side effects. Patient is agreeable to call 911 or go to the nearest ER should he/she begin having SI/HI.            This document has been electronically signed by MARY CARMEN Duran, PMHNP-BC  September 1, 2021 14:00 EDT      Part of this note may be an electronic transcription/translation of spoken language to printed text using the Dragon Dictation System.

## 2021-09-03 ENCOUNTER — PATIENT ROUNDING (BHMG ONLY) (OUTPATIENT)
Dept: PSYCHIATRY | Facility: CLINIC | Age: 41
End: 2021-09-03

## 2021-09-03 NOTE — PROGRESS NOTES
September 3, 2021    Hello, may I speak with Girma Chapman?    My name is JAKOB ROBERTS    I am  with [facility]  [facility]  [address] [place] [id number] [phone].    Before we get started may I verify your date of birth? 1980    I am calling to officially welcome you to our practice and ask about your recent visit. Is this a good time to talk?VOICEMAIL LEFT    Tell me about your visit with us. What things went well?  VOICEMAIL LEFT       We're always looking for ways to make our patients' experiences even better. Do you have recommendations on ways we may improve?  VOICEMAIL LEFT    Overall were you satisfied with your first visit to our practice? VOICEMAIL LEFT       I appreciate you taking the time to speak with me today. Is there anything else I can do for you? VOICEMAIL LEFT      Thank you, and have a great day.

## 2021-09-27 ENCOUNTER — OFFICE VISIT (OUTPATIENT)
Dept: PSYCHIATRY | Facility: CLINIC | Age: 41
End: 2021-09-27

## 2021-09-27 DIAGNOSIS — F41.1 GENERALIZED ANXIETY DISORDER: ICD-10-CM

## 2021-09-27 DIAGNOSIS — F33.2 SEVERE EPISODE OF RECURRENT MAJOR DEPRESSIVE DISORDER, WITHOUT PSYCHOTIC FEATURES (HCC): Primary | ICD-10-CM

## 2021-09-27 PROCEDURE — 90834 PSYTX W PT 45 MINUTES: CPT | Performed by: SOCIAL WORKER

## 2021-09-27 NOTE — PROGRESS NOTES
"Date: 09/27/2021   Time In: 900  Time Out: 945    PROGRESS NOTE  Data:  Girma Chapman is a 41 y.o. male who presents today for individual therapy session at [facility].     ICD-10-CM ICD-9-CM   1. Severe episode of recurrent major depressive disorder, without psychotic features (HCC)  F33.2 296.33   2. Generalized anxiety disorder  F41.1 300.02     Patient presents to therapy with concerns regarding depression. Patient reports he is unable to do what he wants for work because of a heart attack he had this year. Patient also reports stressors regarding not seeing his son because of COVID 19.  Patient discussed a history of anger issues and trauma by parents during childhood. Patient reports verbal aggression currently.  Patient reports he has a history of outpatient therapy and attended a rehab at 19 years old. Patient reports his support system includes his girlfriend of 1.5 years and his aunt. Patient discussed his current coping includes: marijuana, reading and writing. Patient goals for therapy are \"get back on track with life and security\". Patient denies suicidal ideations and self harm behaviors.      Clinical Maneuvering/Intervention:    (Scales based on 0 - 10 with 10 being the worst)  Depression:   na Anxiety:  na       Assisted patient in processing above session content; acknowledged and normalized patient’s thoughts, feelings, and concerns.Discussed triggers associated with patient's anger.  Also discussed coping skills for patient to implement such as breathing and distraction activities. Discussed with patient the importance of identifying triggers and warning signs related to anger to help with management of symptoms.    Allowed patient to freely discuss issues without interruption or judgment. Provided safe, confidential environment to facilitate the development of positive therapeutic relationship and encourage open, honest communication. Assisted patient in identifying risk " factors which would indicate the need for higher level of care including thoughts to harm self or others and/or self-harming behavior and encouraged patient to contact this office, call 911, or present to the nearest emergency room should any of these events occur. Discussed crisis intervention services and means to access. Patient adamantly and convincingly denies current suicidal or homicidal ideation or perceptual disturbance.    Assessment   Patient appears to maintain relative stability as compared to their baseline.  However, patient continues to struggle with depression and anxiety which continues to cause impairment in important areas of functioning.  As a result, they can be reasonably expected to continue to benefit from treatment and would likely be at increased risk for decompensation otherwise.    Mental Status Exam:   Hygiene:   good  Cooperation:  Cooperative  Eye Contact:  Good  Psychomotor Behavior:  Appropriate  Affect:  Appropriate  Mood: normal  Speech:  Normal  Thought Process:  Goal directed  Thought Content:  Normal  Suicidal:  None  Homicidal:  None  Hallucinations:  None  Delusion:  None  Memory:  Intact  Orientation:  Grossly intact  Reliability:  good  Insight:  Fair  Judgement:  Poor  Impulse Control:  Poor  Physical/Medical Issues:  Yes patient reports having heart attack this year     Patient's Support Network Includes:  significant other    Functional Status: Severe impairment    Progress toward goal: Not at goal    Prognosis: Good with Ongoing Treatment          Plan     Patient will continue in individual outpatient therapy with focus on improved functioning and coping skills, maintaining stability, and avoiding decompensation and the need for higher level of care.    Patient will adhere to medication regimen as prescribed and report any side effects. Patient will contact this office, call 911 or present to the nearest emergency room should suicidal or homicidal ideations occur.  Provide Cognitive Behavioral Therapy and Solution Focused Therapy to improve functioning, maintain stability, and avoid decompensation and the need for higher level of care.     Return in about Return in about 2 weeks (around 10/11/2021) for Next scheduled follow up. or earlier if symptoms worsen or fail to improve.            This document has been electronically signed by Megan Goldberg LCSW  September 27, 2021 09:04 EDT      Part of this note may be an electronic transcription/translation of spoken language to printed text using the Dragon Dictation System.

## 2021-10-01 ENCOUNTER — OFFICE VISIT (OUTPATIENT)
Dept: PSYCHIATRY | Facility: CLINIC | Age: 41
End: 2021-10-01

## 2021-10-01 VITALS
HEIGHT: 69 IN | HEART RATE: 78 BPM | SYSTOLIC BLOOD PRESSURE: 110 MMHG | WEIGHT: 193 LBS | DIASTOLIC BLOOD PRESSURE: 78 MMHG | BODY MASS INDEX: 28.58 KG/M2

## 2021-10-01 DIAGNOSIS — F33.2 SEVERE EPISODE OF RECURRENT MAJOR DEPRESSIVE DISORDER, WITHOUT PSYCHOTIC FEATURES (HCC): Primary | Chronic | ICD-10-CM

## 2021-10-01 DIAGNOSIS — F41.1 GENERALIZED ANXIETY DISORDER: Chronic | ICD-10-CM

## 2021-10-01 PROCEDURE — 99214 OFFICE O/P EST MOD 30 MIN: CPT | Performed by: NURSE PRACTITIONER

## 2021-10-01 RX ORDER — FOLIC ACID 1 MG/1
1000 TABLET ORAL DAILY
COMMUNITY
Start: 2021-09-05 | End: 2022-10-05 | Stop reason: SDUPTHER

## 2021-10-01 RX ORDER — SERTRALINE HYDROCHLORIDE 100 MG/1
TABLET, FILM COATED ORAL
COMMUNITY
Start: 2021-09-22 | End: 2021-10-01 | Stop reason: SDUPTHER

## 2021-10-01 RX ORDER — SERTRALINE HYDROCHLORIDE 100 MG/1
100 TABLET, FILM COATED ORAL DAILY
Qty: 30 TABLET | Refills: 2 | Status: ON HOLD | OUTPATIENT
Start: 2021-10-01 | End: 2022-09-17

## 2021-10-01 NOTE — PROGRESS NOTES
"Chief Complaint  Anxiety and Depression    Subjective          Girma Chapman presents to [facility] for medication management of his anxiety and depression.    History of Present Illness: Patient presents today for follow-up appointment after last being seen for initial evaluation on 09/01/2021.  Patient reports today he feels as though things have gone somewhat better over the last month.  He says the transition from Celexa to Zoloft went well, and reports he likes the Zoloft more.  However he says \"for whatever reason I have been smoking a lot more pot than I was before.  Before it was like it may be on the weekends, or 1 night throughout the week thing, but now I am smoking every day\".  Patient reports he has been experiencing shifts in his mood, where he feels he will get very anxious or angry, and just smoking some marijuana \"it just calms me down\".  Patient reports he has been eating and sleeping well, and denies any issues with either.  Patient denies any SI/HI, A/V hallucinations.    Current Medications:   Current Outpatient Medications   Medication Sig Dispense Refill   • aspirin 81 MG EC tablet Take 81 mg by mouth Daily.     • atorvastatin (LIPITOR) 80 MG tablet Take 1 tablet by mouth Every Night. 90 tablet 3   • carvedilol (COREG) 12.5 MG tablet Take 1 tablet by mouth 2 (Two) Times a Day With Meals. 60 tablet 3   • clopidogrel (PLAVIX) 75 MG tablet Take 1 tablet by mouth Daily. 90 tablet 3   • dicyclomine (BENTYL) 20 MG tablet Take 1 tablet by mouth Every 6 (Six) Hours As Needed (Abdominal cramping). 12 tablet 0   • folic acid (FOLVITE) 1 MG tablet Take 1,000 mcg by mouth Daily.     • lisinopril (PRINIVIL,ZESTRIL) 10 MG tablet Take 10 mg by mouth Daily.     • NP Thyroid 30 MG tablet Take 30 mg by mouth Daily.     • sertraline (ZOLOFT) 100 MG tablet Take 1 tablet by mouth Daily. 30 tablet 2     No current facility-administered medications for this visit.       Mental " "Status Exam:   Hygiene:   good  Cooperation:  Cooperative  Eye Contact:  Good  Psychomotor Behavior:  Appropriate  Affect:  Appropriate  Mood: depressed and anxious  Speech:  Normal  Thought Process:  Goal directed  Thought Content:  Mood congruent  Suicidal:  None  Homicidal:  None  Hallucinations:  None  Delusion:  None  Memory:  Intact  Orientation:  Person, Place, Time and Situation  Reliability:  good  Insight:  Good  Judgement:  Good  Impulse Control:  Good  Physical/Medical Issues:  Yes MI (2021), CVA (2018)       Objective   Vital Signs:   /78   Pulse 78   Ht 175.3 cm (69\")   Wt 87.5 kg (193 lb)   BMI 28.50 kg/m²     Physical Exam  Neurological:      Mental Status: He is oriented to person, place, and time. Mental status is at baseline.      Coordination: Coordination is intact.      Gait: Gait is intact.   Psychiatric:         Behavior: Behavior is cooperative.         Thought Content: Thought content normal.         Cognition and Memory: Cognition and memory normal.         Judgment: Judgment normal.        Result Review :     The following data was reviewed by: MARY CARMEN Lucero on 10/01/2021:    Data reviewed: Previous note, therapy note, medication history          Assessment and Plan    Problem List Items Addressed This Visit     None      Visit Diagnoses     Severe episode of recurrent major depressive disorder, without psychotic features (HCC)  (Chronic)   -  Primary    Relevant Medications    sertraline (ZOLOFT) 100 MG tablet    Generalized anxiety disorder  (Chronic)       Relevant Medications    sertraline (ZOLOFT) 100 MG tablet          PHQ-9 Score:   PHQ-9 Total Score: 16    Depression Screening:  Patient screened positive for depression based on a PHQ-9 score of 16 on 10/1/2021. Follow-up recommendations include: Prescribed antidepressant medication treatment and Suicide Risk Assessment performed.      Tobacco Cessation:  Patient is a former tobacco user. No tobacco cessation " education necessary.      Impression/Plan:  -This my first follow-up appointment with patient.  At previous appointment, patient was transitioned from Celexa to Zoloft.  Patient reports today that that change has gone well.  He reports however though he has been experiencing some issues with heightened anxiety, and while he feels his mood has improved somewhat, it is still low.  Patient also reports he has dramatically increased the amount of marijuana he is smoking on a regular basis.  Advised the patient the plan for this appointment was to ensure he was not experiencing side effects associated from the Zoloft, and then likely increase him to 100 mg.  Patient reports he likes the Zoloft more than the Celexa overall.  Advised the patient it would be a good idea for him to either reduce his marijuana use back to previous levels, or stop altogether.  Educated patient that by using marijuana, he was injecting a variable into his healthcare equation, that I could not fully account for.  Patient expresses understanding of this, and reports he is going to try to stop smoking altogether.  -Increase Zoloft to 100 mg daily.  -Encouraged patient to keep upcoming therapy appointments.  -Schedule follow-up for 1 month or as needed.    MEDS ORDERED DURING VISIT:  New Medications Ordered This Visit   Medications   • sertraline (ZOLOFT) 100 MG tablet     Sig: Take 1 tablet by mouth Daily.     Dispense:  30 tablet     Refill:  2         Follow Up   Return in about 1 month (around 11/1/2021), or if symptoms worsen or fail to improve, for Next scheduled follow up.  Patient was given instructions and counseling regarding his condition or for health maintenance advice. Please see specific information pulled into the AVS if appropriate.       TREATMENT PLAN/GOALS: Continue supportive psychotherapy efforts and medications as indicated. Treatment and medication options discussed during today's visit. Patient acknowledged and verbally  consented to continue with current treatment plan and was educated on the importance of compliance with treatment and follow-up appointments.    MEDICATION ISSUES:  Discussed medication options and treatment plan of prescribed medication as well as the risks, benefits, and side effects including potential falls, possible impaired driving and metabolic adversities among others. Patient is agreeable to call the office with any worsening of symptoms or onset of side effects. Patient is agreeable to call 911 or go to the nearest ER should he/she begin having SI/HI.          This document has been electronically signed by MARY CARMEN Duran, PMHNP-BC  October 1, 2021 12:03 EDT      Part of this note may be an electronic transcription/translation of spoken language to printed text using the Dragon Dictation System.

## 2021-11-01 ENCOUNTER — OFFICE VISIT (OUTPATIENT)
Dept: PSYCHIATRY | Facility: CLINIC | Age: 41
End: 2021-11-01

## 2021-11-01 VITALS — HEIGHT: 69 IN | WEIGHT: 195 LBS | BODY MASS INDEX: 28.88 KG/M2

## 2021-11-01 DIAGNOSIS — F41.1 GENERALIZED ANXIETY DISORDER: Chronic | ICD-10-CM

## 2021-11-01 DIAGNOSIS — F33.2 SEVERE EPISODE OF RECURRENT MAJOR DEPRESSIVE DISORDER, WITHOUT PSYCHOTIC FEATURES (HCC): Primary | Chronic | ICD-10-CM

## 2021-11-01 PROCEDURE — 99214 OFFICE O/P EST MOD 30 MIN: CPT | Performed by: NURSE PRACTITIONER

## 2021-11-01 NOTE — PROGRESS NOTES
"Chief Complaint  Anxiety and Depression    Subjective          Girma Chapman presents to [facility] for medication management of his anxiety and depression.    History of Present Illness: Patient presents today for follow-up appointment for last being seen on 10/01/2021.  Patient reports today \"I am doing pretty good\".  Patient has continued to improve with regards to his mood and anxiety.  Patient reports today the Zoloft as \"helped a lot with the depression\".  Patient reports he recently had lab work at his PCP which showed he had very low testosterone.  \"It dropped from around 1500 to 67\".  Patient reports he is currently waiting on his doctor to develop a plan to address this.  Patient was very glad to know there was a physical cause that was contributing to his fatigue.  Patient reports he has been sleeping and eating well, and denies any new or significant issues with either.  Patient denies any SI/HI, A/V hallucinations.    Current Medications:   Current Outpatient Medications   Medication Sig Dispense Refill   • aspirin 81 MG EC tablet Take 81 mg by mouth Daily.     • atorvastatin (LIPITOR) 80 MG tablet Take 1 tablet by mouth Every Night. 90 tablet 3   • carvedilol (COREG) 12.5 MG tablet Take 1 tablet by mouth 2 (Two) Times a Day With Meals. 60 tablet 3   • clopidogrel (PLAVIX) 75 MG tablet Take 1 tablet by mouth Daily. 90 tablet 3   • dicyclomine (BENTYL) 20 MG tablet Take 1 tablet by mouth Every 6 (Six) Hours As Needed (Abdominal cramping). 12 tablet 0   • folic acid (FOLVITE) 1 MG tablet Take 1,000 mcg by mouth Daily.     • lisinopril (PRINIVIL,ZESTRIL) 10 MG tablet Take 10 mg by mouth Daily.     • NP Thyroid 30 MG tablet Take 30 mg by mouth Daily.     • sertraline (ZOLOFT) 100 MG tablet Take 1 tablet by mouth Daily. 30 tablet 2     No current facility-administered medications for this visit.       Mental Status Exam:   Hygiene:   good  Cooperation:  Cooperative  Eye " "Contact:  Good  Psychomotor Behavior:  Appropriate  Affect:  Full range and Appropriate  Mood: normal and euthymic  Speech:  Normal  Thought Process:  Goal directed  Thought Content:  Mood congruent  Suicidal:  None  Homicidal:  None  Hallucinations:  None  Delusion:  None  Memory:  Intact  Orientation:  Person, Place, Time and Situation  Reliability:  good  Insight:  Good  Judgement:  Good  Impulse Control:  Good  Physical/Medical Issues:  Yes Past MI, past CVA       Objective   Vital Signs:   Ht 175.3 cm (69\")   Wt 88.5 kg (195 lb)   BMI 28.80 kg/m²     Physical Exam  Neurological:      Mental Status: He is oriented to person, place, and time. Mental status is at baseline.      Coordination: Coordination is intact.      Gait: Gait is intact.   Psychiatric:         Behavior: Behavior is cooperative.         Thought Content: Thought content normal.         Cognition and Memory: Cognition and memory normal.         Judgment: Judgment normal.        Result Review :     The following data was reviewed by: MARY CARMEN Lucero on 11/01/2021:    Data reviewed: Previous note, medication history          Assessment and Plan    Problem List Items Addressed This Visit     None      Visit Diagnoses     Severe episode of recurrent major depressive disorder, without psychotic features (HCC)  (Chronic)   -  Primary    Generalized anxiety disorder  (Chronic)                 Tobacco Cessation:  Patient is a former tobacco user. No tobacco cessation education necessary.      Impression/Plan:  -This follow-up appointment.  Patient presents today reports he feels though he is done well since his last appointment.  He endorses his mood and anxiety have continued to improve with the Zoloft 100 mg daily.  Patient reports he has been taking it on a daily basis as prescribed, and denies any adverse effects associated with it.  Patient endorses liking his current medication, and is happy with the progress he has continued to make.  " Patient has reduced his marijuana intake, and continues to progress towards stopping completely.  -Maintain Zoloft 100 mg daily.  -Schedule follow-up for 2 months or as needed.    MEDS ORDERED DURING VISIT:  No orders of the defined types were placed in this encounter.        Follow Up   Return in about 2 months (around 1/1/2022), or if symptoms worsen or fail to improve, for Next scheduled follow up.  Patient was given instructions and counseling regarding his condition or for health maintenance advice. Please see specific information pulled into the AVS if appropriate.       TREATMENT PLAN/GOALS: Continue supportive psychotherapy efforts and medications as indicated. Treatment and medication options discussed during today's visit. Patient acknowledged and verbally consented to continue with current treatment plan and was educated on the importance of compliance with treatment and follow-up appointments.    MEDICATION ISSUES:  Discussed medication options and treatment plan of prescribed medication as well as the risks, benefits, and side effects including potential falls, possible impaired driving and metabolic adversities among others. Patient is agreeable to call the office with any worsening of symptoms or onset of side effects. Patient is agreeable to call 911 or go to the nearest ER should he/she begin having SI/HI.          This document has been electronically signed by MARY CARMEN Duran, PMHNP-BC  November 1, 2021 12:51 EDT      Part of this note may be an electronic transcription/translation of spoken language to printed text using the Dragon Dictation System.

## 2021-11-18 ENCOUNTER — TELEPHONE (OUTPATIENT)
Dept: CARDIOLOGY | Facility: CLINIC | Age: 41
End: 2021-11-18

## 2021-11-22 ENCOUNTER — OFFICE VISIT (OUTPATIENT)
Dept: PSYCHIATRY | Facility: CLINIC | Age: 41
End: 2021-11-22

## 2021-11-22 DIAGNOSIS — F41.1 GENERALIZED ANXIETY DISORDER: ICD-10-CM

## 2021-11-22 DIAGNOSIS — F33.2 SEVERE EPISODE OF RECURRENT MAJOR DEPRESSIVE DISORDER, WITHOUT PSYCHOTIC FEATURES (HCC): Primary | ICD-10-CM

## 2021-11-22 PROCEDURE — 90837 PSYTX W PT 60 MINUTES: CPT | Performed by: SOCIAL WORKER

## 2021-11-22 NOTE — PROGRESS NOTES
"Date: 11/22/2021   Time In: 1430  Time Out: 1537    PROGRESS NOTE  Data:  Girma Chapman is a 41 y.o. male who presents today for individual therapy session at [facility].     ICD-10-CM ICD-9-CM   1. Severe episode of recurrent major depressive disorder, without psychotic features (HCC)  F33.2 296.33   2. Generalized anxiety disorder  F41.1 300.02     Patient reports feeling \"better\" since starting the Zoloft. Patient discussed some problems with co workers judging him for his brain injury. Patient reports \"they talk to me real slow\". Patient reports working on his anger and reports feeling it is a \"family trait\". Patient reports he has been considering other career options. Patient discussed ongoing issues with not being able to see his son. Patient reports poor relationship with ex wife and reports she is making it difficult to see him. Patient reports his son has been asking him \"weird\" questions lately and patient reports feeling concerned where he is getting that information from. Patient reports he wants to avoid going to court because \"I went through that as a kid\". Patient reports feeling he is getting his life back on track. Patient reports his new goal is \"get a bank account and start saving\". Patient denies suicidal ideations.       Clinical Maneuvering/Intervention:    (Scales based on 0 - 10 with 10 being the worst)  Depression:   na Anxiety:  na       Assisted patient in processing above session content; acknowledged and normalized patient’s thoughts, feelings, and concerns. Discussed healthy communication skills to use with ex wife such as; I statements, appropriate tone of voice and not bringing up old resentments.  Discussed triggers associated with patient's anger.  Also discussed coping skills for patient to implement such as breathing exercises and relaxation.    Allowed patient to freely discuss issues without interruption or judgment. Provided safe, confidential environment to " facilitate the development of positive therapeutic relationship and encourage open, honest communication. Assisted patient in identifying risk factors which would indicate the need for higher level of care including thoughts to harm self or others and/or self-harming behavior and encouraged patient to contact this office, call 911, or present to the nearest emergency room should any of these events occur. Discussed crisis intervention services and means to access. Patient adamantly and convincingly denies current suicidal or homicidal ideation or perceptual disturbance.    Assessment   Patient appears to maintain relative stability as compared to their baseline.  However, patient continues to struggle with depression and anxiety which continues to cause impairment in important areas of functioning.  As a result, they can be reasonably expected to continue to benefit from treatment and would likely be at increased risk for decompensation otherwise.    Mental Status Exam:   Hygiene:   good  Cooperation:  Cooperative  Eye Contact:  Good  Psychomotor Behavior:  Appropriate  Affect:  Appropriate  Mood: normal  Speech:  Normal  Thought Process:  Goal directed  Thought Content:  Normal  Suicidal:  None  Homicidal:  None  Hallucinations:  None  Delusion:  None  Memory:  Intact  Orientation:  Grossly intact  Reliability:  good  Insight:  Fair  Judgement:  Poor  Impulse Control:  Poor  Physical/Medical Issues:  Yes patient reports eye problems     Patient's Support Network Includes:  significant other    Functional Status: Moderate impairment     Progress toward goal: Not at goal    Prognosis: Good with Ongoing Treatment          Plan     Patient will continue in individual outpatient therapy with focus on improved functioning and coping skills, maintaining stability, and avoiding decompensation and the need for higher level of care.    Patient will adhere to medication regimen as prescribed and report any side effects.  Patient will contact this office, call 911 or present to the nearest emergency room should suicidal or homicidal ideations occur. Provide Cognitive Behavioral Therapy and Solution Focused Therapy to improve functioning, maintain stability, and avoid decompensation and the need for higher level of care.     Return in about Return in about 2 weeks (around 12/6/2021) for Next scheduled follow up. or earlier if symptoms worsen or fail to improve.            This document has been electronically signed by Megan Goldberg LCSW  November 22, 2021 14:30 EST      Part of this note may be an electronic transcription/translation of spoken language to printed text using the Dragon Dictation System.

## 2021-12-18 DIAGNOSIS — I10 ESSENTIAL HYPERTENSION: Primary | ICD-10-CM

## 2021-12-21 RX ORDER — CARVEDILOL 12.5 MG/1
TABLET ORAL
Qty: 60 TABLET | Refills: 3 | Status: SHIPPED | OUTPATIENT
Start: 2021-12-21 | End: 2021-12-28

## 2021-12-28 ENCOUNTER — OFFICE VISIT (OUTPATIENT)
Dept: CARDIOLOGY | Facility: CLINIC | Age: 41
End: 2021-12-28

## 2021-12-28 VITALS
BODY MASS INDEX: 28.73 KG/M2 | WEIGHT: 194 LBS | HEIGHT: 69 IN | SYSTOLIC BLOOD PRESSURE: 112 MMHG | RESPIRATION RATE: 18 BRPM | DIASTOLIC BLOOD PRESSURE: 82 MMHG | OXYGEN SATURATION: 99 % | HEART RATE: 98 BPM

## 2021-12-28 DIAGNOSIS — I25.10 CORONARY ARTERY DISEASE INVOLVING NATIVE CORONARY ARTERY OF NATIVE HEART WITHOUT ANGINA PECTORIS: Primary | ICD-10-CM

## 2021-12-28 DIAGNOSIS — I10 ESSENTIAL HYPERTENSION: ICD-10-CM

## 2021-12-28 DIAGNOSIS — E78.00 PURE HYPERCHOLESTEROLEMIA: ICD-10-CM

## 2021-12-28 PROCEDURE — 99214 OFFICE O/P EST MOD 30 MIN: CPT | Performed by: INTERNAL MEDICINE

## 2021-12-28 RX ORDER — HYDROXYZINE HYDROCHLORIDE 10 MG/1
10 TABLET, FILM COATED ORAL NIGHTLY
COMMUNITY
Start: 2021-11-19

## 2021-12-28 RX ORDER — LISINOPRIL 5 MG/1
5 TABLET ORAL DAILY
Qty: 90 TABLET | Refills: 3 | Status: SHIPPED | OUTPATIENT
Start: 2021-12-28 | End: 2022-10-05 | Stop reason: DRUGHIGH

## 2021-12-28 RX ORDER — CARVEDILOL 6.25 MG/1
12.5 TABLET ORAL 2 TIMES DAILY WITH MEALS
Qty: 180 TABLET | Refills: 3 | Status: SHIPPED | OUTPATIENT
Start: 2021-12-28 | End: 2022-10-05 | Stop reason: DRUGHIGH

## 2021-12-28 NOTE — PROGRESS NOTES
[facility] Office Follow Up Note    Girma Chapman  [id number]  2019    Primary Care Provider: Girma Arambula DO    Chief Complaint: Routine follow-up    History of Present Illness:   Mr. Girma Chapman is a 41 y.o. male who presents to the Cardiology Clinic for routine follow-up.  The patient has a past medical history significant for hypertension, possible prior CVA, and chronic cannabis use.  He has a past cardiac history significant for presentation with an inferior STEMI in , undergoing PCI to the proximal RCA at that time.  He had mild to moderate residual stenosis in the LCx.  A post MI echocardiogram showed a low normal LVEF of 50-55%.  He returns to cardiology clinic today for routine follow-up.  Since his last appointment, the patient reports he has been well with no significant changes in his health.  He remains active exercising on a regular basis without exertional chest pain or anginal symptoms.  He does report intermittent episodes of mild hypotension associated with fatigue.  His lisinopril was subsequently held, with improvement in his symptoms and blood pressure.  He continues to tolerate DAPT without significant bleeding or bruising.  No other specific complaints today.    Past Cardiac Testin. Last Coronary Angio: 2021              1.  Severe thrombotic stenosis of the proximal to mid RCA as culprit for inferior STEMI (significantly delayed presentation)                            -Successful PCI of the proximal to mid RCA with placement of a 4.0 x 18 mm Xience CHRISTOPHE postdilated to approximately 4.7 mm proximally              2.  Moderate stenosis of the mid LCx              3.  Mild stenosis of the ostial ramus  2. Prior Stress Testing: None  3. Last Echo: 7/15/2021              1.  LVEF 50-55%              2.  Normal diastolic function              3.  Mild to moderate concentric LVH  4. Prior Holter Monitor: None    Review of Systems:    Review of Systems   Constitutional: Negative for activity change, appetite change, chills, diaphoresis, fatigue, fever, unexpected weight gain and unexpected weight loss.   Eyes: Negative for blurred vision and double vision.   Respiratory: Negative for cough, chest tightness, shortness of breath and wheezing.    Cardiovascular: Negative for chest pain, palpitations and leg swelling.   Gastrointestinal: Negative for abdominal pain, anal bleeding, blood in stool and GERD.   Endocrine: Negative for cold intolerance and heat intolerance.   Genitourinary: Negative for hematuria.   Neurological: Negative for dizziness, syncope, weakness and light-headedness.   Hematological: Does not bruise/bleed easily.   Psychiatric/Behavioral: Negative for depressed mood and stress. The patient is not nervous/anxious.        I have reviewed and/or updated the patient's past medical, past surgical, family, social history, problem list and allergies as appropriate.     Medications:     Current Outpatient Medications:   •  aspirin 81 MG EC tablet, Take 81 mg by mouth Daily., Disp: , Rfl:   •  atorvastatin (LIPITOR) 80 MG tablet, Take 1 tablet by mouth Every Night., Disp: 90 tablet, Rfl: 3  •  carvedilol (COREG) 6.25 MG tablet, Take 2 tablets by mouth 2 (Two) Times a Day With Meals., Disp: 180 tablet, Rfl: 3  •  clopidogrel (PLAVIX) 75 MG tablet, Take 1 tablet by mouth Daily., Disp: 90 tablet, Rfl: 3  •  dicyclomine (BENTYL) 20 MG tablet, Take 1 tablet by mouth Every 6 (Six) Hours As Needed (Abdominal cramping)., Disp: 12 tablet, Rfl: 0  •  folic acid (FOLVITE) 1 MG tablet, Take 1,000 mcg by mouth Daily., Disp: , Rfl:   •  hydrOXYzine (ATARAX) 10 MG tablet, Every 6 (Six) Hours As Needed., Disp: , Rfl:   •  NP Thyroid 30 MG tablet, Take 30 mg by mouth Daily., Disp: , Rfl:   •  lisinopril (PRINIVIL,ZESTRIL) 5 MG tablet, Take 1 tablet by mouth Daily., Disp: 90 tablet, Rfl: 3  •  sertraline (ZOLOFT) 100 MG tablet, Take 1 tablet by mouth  "Daily., Disp: 30 tablet, Rfl: 2    Physical Exam:  Vital Signs:   Vitals:    12/28/21 1253   BP: 112/82   BP Location: Right arm   Pulse: 98   Resp: 18   SpO2: 99%   Weight: 88 kg (194 lb)   Height: 175.3 cm (69\")       Physical Exam  Constitutional:       General: He is not in acute distress.     Appearance: Normal appearance. He is well-developed. He is not diaphoretic.   HENT:      Head: Normocephalic and atraumatic.   Eyes:      General: No scleral icterus.     Pupils: Pupils are equal, round, and reactive to light.   Neck:      Trachea: No tracheal deviation.   Cardiovascular:      Rate and Rhythm: Normal rate and regular rhythm.      Heart sounds: Normal heart sounds. No murmur heard.  No friction rub. No gallop.       Comments: Normal JVD.  2+ right radial pulse  Pulmonary:      Effort: Pulmonary effort is normal. No respiratory distress.      Breath sounds: Normal breath sounds. No stridor. No wheezing or rales.   Chest:      Chest wall: No tenderness.   Abdominal:      General: Bowel sounds are normal. There is no distension.      Palpations: Abdomen is soft.      Tenderness: There is no abdominal tenderness. There is no guarding or rebound.   Musculoskeletal:         General: No swelling. Normal range of motion.      Cervical back: Neck supple.   Lymphadenopathy:      Cervical: No cervical adenopathy.   Skin:     General: Skin is warm and dry.      Findings: No erythema.   Neurological:      General: No focal deficit present.      Mental Status: He is alert and oriented to person, place, and time.   Psychiatric:         Mood and Affect: Mood normal.         Behavior: Behavior normal.         Results Review:   I reviewed the patient's new clinical results.      Assessment / Plan:     1.  Coronary artery disease  --Presentation with an inferior STEMI in 7/21, underwent PCI x1 to the proximal RCA, mild to moderate residual stenosis in the mid LCx  --Post MI echocardiogram shows LVEF low normal at " 50-55%  --Remains active, without recurrent exertional anginal symptoms  --Continue DAPT with aspirin and Plavix uninterrupted through 7/22, then aspirin monotherapy  --Given mild symptomatic hypotension, will down titrate carvedilol and lisinopril  --Continue high intensity statin  --Follow-up in 6 months, sooner if required     2.  Hypertension  --Given mild symptomatic hypotension, decrease carvedilol to 6.25 mg and lisinopril to 5 mg  --If persistent symptomatic hypotension, would then stop lisinopril     3.  Hyperlipidemia  --Lipid profile in 7/21 with , HDL 21, triglycerides 104  --Continue high intensity statin      Follow Up:   Return in about 6 months (around 6/28/2022).      Thank you for allowing me to participate in the care of your patient. Please to not hesitate to contact me with additional questions or concerns.     JEAN CLAUDE Grady MD  Interventional Cardiology   12/28/2021  12:57 EST

## 2022-05-03 ENCOUNTER — TELEPHONE (OUTPATIENT)
Dept: CARDIOLOGY | Facility: CLINIC | Age: 42
End: 2022-05-03

## 2022-06-23 ENCOUNTER — TRANSCRIBE ORDERS (OUTPATIENT)
Dept: LAB | Facility: HOSPITAL | Age: 42
End: 2022-06-23

## 2022-06-23 ENCOUNTER — LAB (OUTPATIENT)
Dept: LAB | Facility: HOSPITAL | Age: 42
End: 2022-06-23

## 2022-06-23 DIAGNOSIS — E29.1 3-OXO-5 ALPHA-STEROID DELTA 4-DEHYDROGENASE DEFICIENCY: ICD-10-CM

## 2022-06-23 DIAGNOSIS — E03.9 MYXEDEMA HEART DISEASE: ICD-10-CM

## 2022-06-23 DIAGNOSIS — E78.1 PURE HYPERGLYCERIDEMIA: ICD-10-CM

## 2022-06-23 DIAGNOSIS — I51.9 MYXEDEMA HEART DISEASE: Primary | ICD-10-CM

## 2022-06-23 DIAGNOSIS — R53.83 TIREDNESS: ICD-10-CM

## 2022-06-23 DIAGNOSIS — I51.9 MYXEDEMA HEART DISEASE: ICD-10-CM

## 2022-06-23 DIAGNOSIS — E03.9 MYXEDEMA HEART DISEASE: Primary | ICD-10-CM

## 2022-06-23 LAB
25(OH)D3 SERPL-MCNC: 33.2 NG/ML (ref 30–100)
ALBUMIN SERPL-MCNC: 4.1 G/DL (ref 3.5–5.2)
ALBUMIN/GLOB SERPL: 1.4 G/DL
ALP SERPL-CCNC: 69 U/L (ref 39–117)
ALT SERPL W P-5'-P-CCNC: 30 U/L (ref 1–41)
ANION GAP SERPL CALCULATED.3IONS-SCNC: 8.3 MMOL/L (ref 5–15)
AST SERPL-CCNC: 23 U/L (ref 1–40)
BASOPHILS # BLD AUTO: 0.06 10*3/MM3 (ref 0–0.2)
BASOPHILS NFR BLD AUTO: 0.8 % (ref 0–1.5)
BILIRUB CONJ SERPL-MCNC: <0.2 MG/DL (ref 0–0.3)
BILIRUB SERPL-MCNC: 0.7 MG/DL (ref 0–1.2)
BUN SERPL-MCNC: 14 MG/DL (ref 6–20)
BUN/CREAT SERPL: 12.6 (ref 7–25)
CALCIUM SPEC-SCNC: 9 MG/DL (ref 8.6–10.5)
CHLORIDE SERPL-SCNC: 99 MMOL/L (ref 98–107)
CHOLEST SERPL-MCNC: 234 MG/DL (ref 0–200)
CO2 SERPL-SCNC: 28.7 MMOL/L (ref 22–29)
CREAT SERPL-MCNC: 1.11 MG/DL (ref 0.76–1.27)
DEPRECATED RDW RBC AUTO: 45.4 FL (ref 37–54)
EGFRCR SERPLBLD CKD-EPI 2021: 86.6 ML/MIN/1.73
EOSINOPHIL # BLD AUTO: 0.41 10*3/MM3 (ref 0–0.4)
EOSINOPHIL NFR BLD AUTO: 5.7 % (ref 0.3–6.2)
ERYTHROCYTE [DISTWIDTH] IN BLOOD BY AUTOMATED COUNT: 13.6 % (ref 12.3–15.4)
ERYTHROCYTE [SEDIMENTATION RATE] IN BLOOD: 7 MM/HR (ref 0–15)
GLOBULIN UR ELPH-MCNC: 2.9 GM/DL
GLUCOSE SERPL-MCNC: 82 MG/DL (ref 65–99)
HCT VFR BLD AUTO: 53.8 % (ref 37.5–51)
HDLC SERPL-MCNC: 43 MG/DL (ref 40–60)
HGB BLD-MCNC: 18.2 G/DL (ref 13–17.7)
IMM GRANULOCYTES # BLD AUTO: 0.01 10*3/MM3 (ref 0–0.05)
IMM GRANULOCYTES NFR BLD AUTO: 0.1 % (ref 0–0.5)
IRON 24H UR-MRATE: 151 MCG/DL (ref 59–158)
IRON SATN MFR SERPL: 39 % (ref 20–50)
LDLC SERPL CALC-MCNC: 176 MG/DL (ref 0–100)
LDLC/HDLC SERPL: 4.04 {RATIO}
LYMPHOCYTES # BLD AUTO: 1.62 10*3/MM3 (ref 0.7–3.1)
LYMPHOCYTES NFR BLD AUTO: 22.7 % (ref 19.6–45.3)
MAGNESIUM SERPL-MCNC: 2 MG/DL (ref 1.6–2.6)
MCH RBC QN AUTO: 30.8 PG (ref 26.6–33)
MCHC RBC AUTO-ENTMCNC: 33.8 G/DL (ref 31.5–35.7)
MCV RBC AUTO: 91 FL (ref 79–97)
MONOCYTES # BLD AUTO: 0.51 10*3/MM3 (ref 0.1–0.9)
MONOCYTES NFR BLD AUTO: 7.1 % (ref 5–12)
NEUTROPHILS NFR BLD AUTO: 4.53 10*3/MM3 (ref 1.7–7)
NEUTROPHILS NFR BLD AUTO: 63.6 % (ref 42.7–76)
NRBC BLD AUTO-RTO: 0 /100 WBC (ref 0–0.2)
PHOSPHATE SERPL-MCNC: 2.7 MG/DL (ref 2.5–4.5)
PLATELET # BLD AUTO: 268 10*3/MM3 (ref 140–450)
PMV BLD AUTO: 10.3 FL (ref 6–12)
POTASSIUM SERPL-SCNC: 3.6 MMOL/L (ref 3.5–5.2)
PROT SERPL-MCNC: 7 G/DL (ref 6–8.5)
RBC # BLD AUTO: 5.91 10*6/MM3 (ref 4.14–5.8)
SODIUM SERPL-SCNC: 136 MMOL/L (ref 136–145)
TIBC SERPL-MCNC: 386 MCG/DL (ref 298–536)
TRANSFERRIN SERPL-MCNC: 259 MG/DL (ref 200–360)
TRIGL SERPL-MCNC: 87 MG/DL (ref 0–150)
TSH SERPL DL<=0.05 MIU/L-ACNC: 4.93 UIU/ML (ref 0.27–4.2)
VIT B12 BLD-MCNC: 901 PG/ML (ref 211–946)
VLDLC SERPL-MCNC: 15 MG/DL (ref 5–40)
WBC NRBC COR # BLD: 7.14 10*3/MM3 (ref 3.4–10.8)

## 2022-06-23 PROCEDURE — 84466 ASSAY OF TRANSFERRIN: CPT

## 2022-06-23 PROCEDURE — 85652 RBC SED RATE AUTOMATED: CPT

## 2022-06-23 PROCEDURE — 36415 COLL VENOUS BLD VENIPUNCTURE: CPT

## 2022-06-23 PROCEDURE — 82607 VITAMIN B-12: CPT

## 2022-06-23 PROCEDURE — 82248 BILIRUBIN DIRECT: CPT

## 2022-06-23 PROCEDURE — 83540 ASSAY OF IRON: CPT

## 2022-06-23 PROCEDURE — 83735 ASSAY OF MAGNESIUM: CPT

## 2022-06-23 PROCEDURE — 84403 ASSAY OF TOTAL TESTOSTERONE: CPT

## 2022-06-23 PROCEDURE — 80050 GENERAL HEALTH PANEL: CPT

## 2022-06-23 PROCEDURE — 82306 VITAMIN D 25 HYDROXY: CPT

## 2022-06-23 PROCEDURE — 84402 ASSAY OF FREE TESTOSTERONE: CPT

## 2022-06-23 PROCEDURE — 84100 ASSAY OF PHOSPHORUS: CPT

## 2022-06-23 PROCEDURE — 80061 LIPID PANEL: CPT

## 2022-06-26 LAB
TESTOST FREE SERPL-MCNC: 7.6 PG/ML (ref 8.7–25.1)
TESTOST SERPL-MCNC: 336 NG/DL (ref 264–916)

## 2022-07-06 ENCOUNTER — OFFICE VISIT (OUTPATIENT)
Dept: CARDIOLOGY | Facility: CLINIC | Age: 42
End: 2022-07-06

## 2022-07-06 VITALS
HEART RATE: 81 BPM | SYSTOLIC BLOOD PRESSURE: 120 MMHG | RESPIRATION RATE: 18 BRPM | OXYGEN SATURATION: 98 % | WEIGHT: 183 LBS | DIASTOLIC BLOOD PRESSURE: 86 MMHG | HEIGHT: 69 IN | BODY MASS INDEX: 27.11 KG/M2

## 2022-07-06 DIAGNOSIS — I10 ESSENTIAL HYPERTENSION: ICD-10-CM

## 2022-07-06 DIAGNOSIS — E78.00 PURE HYPERCHOLESTEROLEMIA: ICD-10-CM

## 2022-07-06 DIAGNOSIS — I25.10 CORONARY ARTERY DISEASE INVOLVING NATIVE CORONARY ARTERY OF NATIVE HEART WITHOUT ANGINA PECTORIS: Primary | ICD-10-CM

## 2022-07-06 PROCEDURE — 99214 OFFICE O/P EST MOD 30 MIN: CPT | Performed by: INTERNAL MEDICINE

## 2022-07-06 NOTE — PROGRESS NOTES
[facility] Cardiology Office Follow Up Note    Girma Chapman  [id number]  2022    Primary Care Provider: Gabriel Mandel PA    Chief Complaint: Routine follow-up    History of Present Illness:   Mr. Girma Chapman is a 41 y.o. male who presents to the Cardiology Clinic for regular follow-up. The patient has a past medical history significant for hypertension, possible prior CVA, and chronic cannabis use.  He has a past cardiac history significant for presentation with an inferior STEMI in , undergoing PCI to the proximal RCA at that time.  He had mild residual stenosis in the LCx.  A post MI echocardiogram showed a low normal LVEF of 50-55%.   He presents today for routine follow-up.  Since his last appointment, the patient reports he has been well without significant changes in his health.  He remains active, without exertional chest pain or anginal symptoms.  He continues to tolerate DAPT with aspirin and Plavix without significant difficulty.  No specific complaints today.    Past Cardiac Testin. Last Coronary Angio: 2021              1.  Severe thrombotic stenosis of the proximal to mid RCA as culprit for inferior STEMI (significantly delayed presentation)                            -Successful PCI of the proximal to mid RCA with placement of a 4.0 x 18 mm Xience CHRISTOPHE postdilated to approximately 4.7 mm proximally              2.  Mild stenosis of the mid LCx              3.  Mild stenosis of the ostial ramus  2. Prior Stress Testing: None  3. Last Echo: 7/15/2021              1.  LVEF 50-55%              2.  Normal diastolic function              3.  Mild to moderate concentric LVH  4. Prior Holter Monitor: None       Review of Systems:   Review of Systems   Constitutional: Negative for activity change, appetite change, chills, diaphoresis, fatigue, fever, unexpected weight gain and unexpected weight loss.   Eyes: Negative for blurred vision and double vision.  "  Respiratory: Negative for cough, chest tightness, shortness of breath and wheezing.    Cardiovascular: Negative for chest pain, palpitations and leg swelling.   Gastrointestinal: Negative for abdominal pain, anal bleeding, blood in stool and GERD.   Endocrine: Negative for cold intolerance and heat intolerance.   Genitourinary: Negative for hematuria.   Neurological: Negative for dizziness, syncope, weakness and light-headedness.   Hematological: Does not bruise/bleed easily.   Psychiatric/Behavioral: Negative for depressed mood and stress. The patient is not nervous/anxious.        I have reviewed and/or updated the patient's past medical, past surgical, family, social history, problem list and allergies as appropriate.     Medications:     Current Outpatient Medications:   •  aspirin 81 MG EC tablet, Take 81 mg by mouth Daily., Disp: , Rfl:   •  atorvastatin (LIPITOR) 80 MG tablet, Take 1 tablet by mouth Every Night., Disp: 90 tablet, Rfl: 3  •  carvedilol (COREG) 6.25 MG tablet, Take 2 tablets by mouth 2 (Two) Times a Day With Meals., Disp: 180 tablet, Rfl: 3  •  dicyclomine (BENTYL) 20 MG tablet, Take 1 tablet by mouth Every 6 (Six) Hours As Needed (Abdominal cramping)., Disp: 12 tablet, Rfl: 0  •  folic acid (FOLVITE) 1 MG tablet, Take 1,000 mcg by mouth Daily., Disp: , Rfl:   •  hydrOXYzine (ATARAX) 10 MG tablet, Every 6 (Six) Hours As Needed., Disp: , Rfl:   •  lisinopril (PRINIVIL,ZESTRIL) 5 MG tablet, Take 1 tablet by mouth Daily., Disp: 90 tablet, Rfl: 3  •  NP Thyroid 30 MG tablet, Take 30 mg by mouth Daily., Disp: , Rfl:   •  sertraline (ZOLOFT) 100 MG tablet, Take 1 tablet by mouth Daily., Disp: 30 tablet, Rfl: 2    Physical Exam:  Vital Signs:   Vitals:    07/06/22 1528   BP: 120/86   BP Location: Left arm   Patient Position: Sitting   Pulse: 81   Resp: 18   SpO2: 98%   Weight: 83 kg (183 lb)   Height: 175.3 cm (69\")       Physical Exam  Constitutional:       General: He is not in acute distress.     " Appearance: Normal appearance. He is not diaphoretic.   HENT:      Head: Normocephalic and atraumatic.   Cardiovascular:      Rate and Rhythm: Normal rate and regular rhythm.      Heart sounds: No murmur heard.     Comments: 2+ right radial pulse  Pulmonary:      Effort: Pulmonary effort is normal. No respiratory distress.      Breath sounds: Normal breath sounds. No stridor. No wheezing, rhonchi or rales.   Abdominal:      General: Bowel sounds are normal. There is no distension.      Palpations: Abdomen is soft.      Tenderness: There is no abdominal tenderness. There is no guarding or rebound.   Musculoskeletal:         General: No swelling. Normal range of motion.      Cervical back: Neck supple. No tenderness.   Skin:     General: Skin is warm and dry.   Neurological:      General: No focal deficit present.      Mental Status: He is alert and oriented to person, place, and time.   Psychiatric:         Mood and Affect: Mood normal.         Behavior: Behavior normal.         Results Review:   I reviewed the patient's new clinical results.      Assessment / Plan:     1.  Coronary artery disease  --Presentation with an inferior STEMI in 7/21, underwent PCI x1 to the proximal RCA, mild residual stenosis in the mid LCx  --No recurrent chest pain or exertional anginal symptoms  --Continue DAPT with aspirin and Plavix uninterrupted through through 7/16/2022, then de-escalate to aspirin monotherapy  --Continue carvedilol and lisinopril  --Continue high intensity statin  --Follow-up in 1 year, sooner if required     2.  Hypertension  --BP well controlled with current antihypertensives     3.  Hyperlipidemia  --Lipid profile in 6/22 shows , HDL 43, triglycerides 87  --Continue high intensity statin    Follow Up:   Return in about 1 year (around 7/6/2023).      Thank you for allowing me to participate in the care of your patient. Please to not hesitate to contact me with additional questions or concerns.     JEAN CLAUDE  Caio Grady MD  Interventional Cardiology   07/06/2022  15:30 EDT

## 2022-09-17 ENCOUNTER — HOSPITAL ENCOUNTER (EMERGENCY)
Facility: HOSPITAL | Age: 42
End: 2022-09-17

## 2022-09-17 ENCOUNTER — HOSPITAL ENCOUNTER (INPATIENT)
Facility: HOSPITAL | Age: 42
LOS: 2 days | Discharge: HOME | End: 2022-09-19
Attending: EMERGENCY MEDICINE | Admitting: INTERNAL MEDICINE

## 2022-09-17 ENCOUNTER — APPOINTMENT (OUTPATIENT)
Dept: GENERAL RADIOLOGY | Facility: HOSPITAL | Age: 42
End: 2022-09-17

## 2022-09-17 DIAGNOSIS — I21.3 ST ELEVATION MYOCARDIAL INFARCTION (STEMI), UNSPECIFIED ARTERY: Primary | ICD-10-CM

## 2022-09-17 DIAGNOSIS — D45 POLYCYTHEMIA VERA: ICD-10-CM

## 2022-09-17 PROBLEM — I21.11 ST ELEVATION MYOCARDIAL INFARCTION INVOLVING RIGHT CORONARY ARTERY: Status: ACTIVE | Noted: 2022-09-17

## 2022-09-17 LAB
ALBUMIN SERPL-MCNC: 3.5 G/DL (ref 3.5–5.2)
ALBUMIN/GLOB SERPL: 1 G/DL
ALP SERPL-CCNC: 74 U/L (ref 39–117)
ALT SERPL W P-5'-P-CCNC: 12 U/L (ref 1–41)
ANION GAP SERPL CALCULATED.3IONS-SCNC: 10.8 MMOL/L (ref 5–15)
AST SERPL-CCNC: 13 U/L (ref 1–40)
BASOPHILS # BLD AUTO: 0.15 10*3/MM3 (ref 0–0.2)
BASOPHILS NFR BLD AUTO: 0.6 % (ref 0–1.5)
BILIRUB SERPL-MCNC: 0.5 MG/DL (ref 0–1.2)
BUN SERPL-MCNC: 9 MG/DL (ref 6–20)
BUN/CREAT SERPL: 8.7 (ref 7–25)
CALCIUM SPEC-SCNC: 8.9 MG/DL (ref 8.6–10.5)
CHLORIDE SERPL-SCNC: 97 MMOL/L (ref 98–107)
CO2 SERPL-SCNC: 26.2 MMOL/L (ref 22–29)
CREAT SERPL-MCNC: 1.04 MG/DL (ref 0.76–1.27)
DEPRECATED RDW RBC AUTO: 45.5 FL (ref 37–54)
EGFRCR SERPLBLD CKD-EPI 2021: 93.1 ML/MIN/1.73
EOSINOPHIL # BLD AUTO: 0.42 10*3/MM3 (ref 0–0.4)
EOSINOPHIL NFR BLD AUTO: 1.8 % (ref 0.3–6.2)
ERYTHROCYTE [DISTWIDTH] IN BLOOD BY AUTOMATED COUNT: 13.6 % (ref 12.3–15.4)
GLOBULIN UR ELPH-MCNC: 3.6 GM/DL
GLUCOSE SERPL-MCNC: 141 MG/DL (ref 65–99)
HCT VFR BLD AUTO: 49.2 % (ref 37.5–51)
HGB BLD-MCNC: 17.2 G/DL (ref 13–17.7)
HOLD SPECIMEN: NORMAL
HOLD SPECIMEN: NORMAL
IMM GRANULOCYTES # BLD AUTO: 0.1 10*3/MM3 (ref 0–0.05)
IMM GRANULOCYTES NFR BLD AUTO: 0.4 % (ref 0–0.5)
LYMPHOCYTES # BLD AUTO: 4.2 10*3/MM3 (ref 0.7–3.1)
LYMPHOCYTES NFR BLD AUTO: 18 % (ref 19.6–45.3)
MCH RBC QN AUTO: 31.7 PG (ref 26.6–33)
MCHC RBC AUTO-ENTMCNC: 35 G/DL (ref 31.5–35.7)
MCV RBC AUTO: 90.8 FL (ref 79–97)
MONOCYTES # BLD AUTO: 1.94 10*3/MM3 (ref 0.1–0.9)
MONOCYTES NFR BLD AUTO: 8.3 % (ref 5–12)
NEUTROPHILS NFR BLD AUTO: 16.52 10*3/MM3 (ref 1.7–7)
NEUTROPHILS NFR BLD AUTO: 70.9 % (ref 42.7–76)
NRBC BLD AUTO-RTO: 0 /100 WBC (ref 0–0.2)
PLATELET # BLD AUTO: 415 10*3/MM3 (ref 140–450)
PMV BLD AUTO: 9 FL (ref 6–12)
POTASSIUM SERPL-SCNC: 4.6 MMOL/L (ref 3.5–5.2)
PROT SERPL-MCNC: 7.1 G/DL (ref 6–8.5)
RBC # BLD AUTO: 5.42 10*6/MM3 (ref 4.14–5.8)
RBC MORPH BLD: NORMAL
SMALL PLATELETS BLD QL SMEAR: ADEQUATE
SODIUM SERPL-SCNC: 134 MMOL/L (ref 136–145)
TROPONIN T SERPL-MCNC: 1.22 NG/ML (ref 0–0.03)
TROPONIN T SERPL-MCNC: <0.01 NG/ML (ref 0–0.03)
WBC MORPH BLD: NORMAL
WBC NRBC COR # BLD: 23.33 10*3/MM3 (ref 3.4–10.8)
WHOLE BLOOD HOLD COAG: NORMAL
WHOLE BLOOD HOLD SPECIMEN: NORMAL

## 2022-09-17 PROCEDURE — 99223 1ST HOSP IP/OBS HIGH 75: CPT | Performed by: INTERNAL MEDICINE

## 2022-09-17 PROCEDURE — 92941 PRQ TRLML REVSC TOT OCCL AMI: CPT | Performed by: INTERNAL MEDICINE

## 2022-09-17 PROCEDURE — 93458 L HRT ARTERY/VENTRICLE ANGIO: CPT | Performed by: INTERNAL MEDICINE

## 2022-09-17 PROCEDURE — C1874 STENT, COATED/COV W/DEL SYS: HCPCS | Performed by: INTERNAL MEDICINE

## 2022-09-17 PROCEDURE — 84484 ASSAY OF TROPONIN QUANT: CPT | Performed by: PHYSICIAN ASSISTANT

## 2022-09-17 PROCEDURE — 0 IOPAMIDOL PER 1 ML: Performed by: INTERNAL MEDICINE

## 2022-09-17 PROCEDURE — 71045 X-RAY EXAM CHEST 1 VIEW: CPT

## 2022-09-17 PROCEDURE — C9606 PERC D-E COR REVASC W AMI S: HCPCS | Performed by: INTERNAL MEDICINE

## 2022-09-17 PROCEDURE — 0 LIDOCAINE 1 % SOLUTION: Performed by: INTERNAL MEDICINE

## 2022-09-17 PROCEDURE — 3E033PZ INTRODUCTION OF PLATELET INHIBITOR INTO PERIPHERAL VEIN, PERCUTANEOUS APPROACH: ICD-10-PCS | Performed by: INTERNAL MEDICINE

## 2022-09-17 PROCEDURE — 93005 ELECTROCARDIOGRAM TRACING: CPT

## 2022-09-17 PROCEDURE — 25010000002 MORPHINE PER 10 MG: Performed by: EMERGENCY MEDICINE

## 2022-09-17 PROCEDURE — 80053 COMPREHEN METABOLIC PANEL: CPT

## 2022-09-17 PROCEDURE — C1725 CATH, TRANSLUMIN NON-LASER: HCPCS | Performed by: INTERNAL MEDICINE

## 2022-09-17 PROCEDURE — 92978 ENDOLUMINL IVUS OCT C 1ST: CPT | Performed by: INTERNAL MEDICINE

## 2022-09-17 PROCEDURE — 84484 ASSAY OF TROPONIN QUANT: CPT

## 2022-09-17 PROCEDURE — C1769 GUIDE WIRE: HCPCS | Performed by: INTERNAL MEDICINE

## 2022-09-17 PROCEDURE — B2111ZZ FLUOROSCOPY OF MULTIPLE CORONARY ARTERIES USING LOW OSMOLAR CONTRAST: ICD-10-PCS | Performed by: INTERNAL MEDICINE

## 2022-09-17 PROCEDURE — 25010000002 ONDANSETRON PER 1 MG: Performed by: EMERGENCY MEDICINE

## 2022-09-17 PROCEDURE — C1753 CATH, INTRAVAS ULTRASOUND: HCPCS | Performed by: INTERNAL MEDICINE

## 2022-09-17 PROCEDURE — C1894 INTRO/SHEATH, NON-LASER: HCPCS | Performed by: INTERNAL MEDICINE

## 2022-09-17 PROCEDURE — 027036Z DILATION OF CORONARY ARTERY, ONE ARTERY WITH THREE DRUG-ELUTING INTRALUMINAL DEVICES, PERCUTANEOUS APPROACH: ICD-10-PCS | Performed by: INTERNAL MEDICINE

## 2022-09-17 PROCEDURE — 25010000002 BIVALIRUDIN TRIFLUOROACETATE 250 MG RECONSTITUTED SOLUTION 1 EACH VIAL: Performed by: INTERNAL MEDICINE

## 2022-09-17 PROCEDURE — 85007 BL SMEAR W/DIFF WBC COUNT: CPT

## 2022-09-17 PROCEDURE — 25010000002 MORPHINE PER 10 MG: Performed by: INTERNAL MEDICINE

## 2022-09-17 PROCEDURE — B221Z2Z COMPUTERIZED TOMOGRAPHY (CT SCAN) OF MULTIPLE CORONARY ARTERIES USING INTRAVASCULAR OPTICAL COHERENCE: ICD-10-PCS | Performed by: INTERNAL MEDICINE

## 2022-09-17 PROCEDURE — 4A023N7 MEASUREMENT OF CARDIAC SAMPLING AND PRESSURE, LEFT HEART, PERCUTANEOUS APPROACH: ICD-10-PCS | Performed by: INTERNAL MEDICINE

## 2022-09-17 PROCEDURE — 25010000002 ONDANSETRON PER 1 MG: Performed by: INTERNAL MEDICINE

## 2022-09-17 PROCEDURE — C1887 CATHETER, GUIDING: HCPCS | Performed by: INTERNAL MEDICINE

## 2022-09-17 PROCEDURE — 25010000002 HEPARIN (PORCINE) PER 1000 UNITS: Performed by: INTERNAL MEDICINE

## 2022-09-17 PROCEDURE — 99284 EMERGENCY DEPT VISIT MOD MDM: CPT

## 2022-09-17 PROCEDURE — 93005 ELECTROCARDIOGRAM TRACING: CPT | Performed by: PHYSICIAN ASSISTANT

## 2022-09-17 PROCEDURE — 85025 COMPLETE CBC W/AUTO DIFF WBC: CPT

## 2022-09-17 PROCEDURE — 25010000002 EPTIFIBATIDE PER 5 MG: Performed by: INTERNAL MEDICINE

## 2022-09-17 DEVICE — XIENCE SKYPOINT™ EVEROLIMUS ELUTING CORONARY STENT SYSTEM 2.75 MM X 12 MM / RAPID-EXCHANGE
Type: IMPLANTABLE DEVICE | Status: FUNCTIONAL
Brand: XIENCE SKYPOINT™

## 2022-09-17 DEVICE — XIENCE SKYPOINT™ EVEROLIMUS ELUTING CORONARY STENT SYSTEM 4.50 MM X 23 MM / RAPID-EXCHANGE
Type: IMPLANTABLE DEVICE | Status: FUNCTIONAL
Brand: XIENCE SKYPOINT™

## 2022-09-17 DEVICE — XIENCE SKYPOINT™ EVEROLIMUS ELUTING CORONARY STENT SYSTEM 4.50 MM X 12 MM / RAPID-EXCHANGE
Type: IMPLANTABLE DEVICE | Status: FUNCTIONAL
Brand: XIENCE SKYPOINT™

## 2022-09-17 RX ORDER — DIPHENHYDRAMINE HYDROCHLORIDE 50 MG/ML
25 INJECTION INTRAMUSCULAR; INTRAVENOUS EVERY 6 HOURS PRN
Status: DISCONTINUED | OUTPATIENT
Start: 2022-09-17 | End: 2022-09-19 | Stop reason: HOSPADM

## 2022-09-17 RX ORDER — MORPHINE SULFATE 4 MG/ML
4 INJECTION, SOLUTION INTRAMUSCULAR; INTRAVENOUS
Status: DISCONTINUED | OUTPATIENT
Start: 2022-09-17 | End: 2022-09-19 | Stop reason: HOSPADM

## 2022-09-17 RX ORDER — ALPRAZOLAM 0.25 MG/1
0.25 TABLET ORAL 3 TIMES DAILY PRN
Status: DISCONTINUED | OUTPATIENT
Start: 2022-09-17 | End: 2022-09-19 | Stop reason: HOSPADM

## 2022-09-17 RX ORDER — EPTIFIBATIDE 0.75 MG/ML
2 INJECTION, SOLUTION INTRAVENOUS CONTINUOUS
Status: DISPENSED | OUTPATIENT
Start: 2022-09-17 | End: 2022-09-18

## 2022-09-17 RX ORDER — LEVOTHYROXINE AND LIOTHYRONINE 9.5; 2.25 UG/1; UG/1
30 TABLET ORAL DAILY
Status: DISCONTINUED | OUTPATIENT
Start: 2022-09-17 | End: 2022-09-19 | Stop reason: HOSPADM

## 2022-09-17 RX ORDER — SODIUM CHLORIDE 9 MG/ML
100 INJECTION, SOLUTION INTRAVENOUS CONTINUOUS
Status: ACTIVE | OUTPATIENT
Start: 2022-09-17 | End: 2022-09-17

## 2022-09-17 RX ORDER — ASPIRIN 81 MG/1
81 TABLET ORAL DAILY
Status: DISCONTINUED | OUTPATIENT
Start: 2022-09-17 | End: 2022-09-19 | Stop reason: HOSPADM

## 2022-09-17 RX ORDER — ONDANSETRON 4 MG/1
4 TABLET, FILM COATED ORAL EVERY 6 HOURS PRN
Status: DISCONTINUED | OUTPATIENT
Start: 2022-09-17 | End: 2022-09-19 | Stop reason: HOSPADM

## 2022-09-17 RX ORDER — MORPHINE SULFATE 4 MG/ML
4 INJECTION, SOLUTION INTRAMUSCULAR; INTRAVENOUS ONCE
Status: COMPLETED | OUTPATIENT
Start: 2022-09-17 | End: 2022-09-17

## 2022-09-17 RX ORDER — NALOXONE HCL 0.4 MG/ML
0.4 VIAL (ML) INJECTION
Status: DISCONTINUED | OUTPATIENT
Start: 2022-09-17 | End: 2022-09-19 | Stop reason: HOSPADM

## 2022-09-17 RX ORDER — ACETAMINOPHEN 325 MG/1
650 TABLET ORAL EVERY 4 HOURS PRN
Status: DISCONTINUED | OUTPATIENT
Start: 2022-09-17 | End: 2022-09-19 | Stop reason: HOSPADM

## 2022-09-17 RX ORDER — HEPARIN SODIUM 1000 [USP'U]/ML
INJECTION, SOLUTION INTRAVENOUS; SUBCUTANEOUS AS NEEDED
Status: DISCONTINUED | OUTPATIENT
Start: 2022-09-17 | End: 2022-09-17 | Stop reason: HOSPADM

## 2022-09-17 RX ORDER — CITALOPRAM 20 MG/1
20 TABLET ORAL DAILY
COMMUNITY
End: 2022-10-05

## 2022-09-17 RX ORDER — LIDOCAINE HYDROCHLORIDE 10 MG/ML
INJECTION, SOLUTION INFILTRATION; PERINEURAL AS NEEDED
Status: DISCONTINUED | OUTPATIENT
Start: 2022-09-17 | End: 2022-09-17 | Stop reason: HOSPADM

## 2022-09-17 RX ORDER — ONDANSETRON 2 MG/ML
4 INJECTION INTRAMUSCULAR; INTRAVENOUS EVERY 6 HOURS PRN
Status: DISCONTINUED | OUTPATIENT
Start: 2022-09-17 | End: 2022-09-19 | Stop reason: HOSPADM

## 2022-09-17 RX ORDER — ATORVASTATIN CALCIUM 40 MG/1
80 TABLET, FILM COATED ORAL NIGHTLY
Status: DISCONTINUED | OUTPATIENT
Start: 2022-09-17 | End: 2022-09-19 | Stop reason: HOSPADM

## 2022-09-17 RX ORDER — SODIUM CHLORIDE 0.9 % (FLUSH) 0.9 %
10 SYRINGE (ML) INJECTION AS NEEDED
Status: DISCONTINUED | OUTPATIENT
Start: 2022-09-17 | End: 2022-09-19 | Stop reason: HOSPADM

## 2022-09-17 RX ORDER — HYDROCODONE BITARTRATE AND ACETAMINOPHEN 5; 325 MG/1; MG/1
1 TABLET ORAL EVERY 4 HOURS PRN
Status: DISCONTINUED | OUTPATIENT
Start: 2022-09-17 | End: 2022-09-19 | Stop reason: HOSPADM

## 2022-09-17 RX ORDER — CITALOPRAM 20 MG/1
20 TABLET ORAL DAILY
Status: DISCONTINUED | OUTPATIENT
Start: 2022-09-17 | End: 2022-09-19 | Stop reason: HOSPADM

## 2022-09-17 RX ORDER — LISINOPRIL 5 MG/1
5 TABLET ORAL DAILY
Status: DISCONTINUED | OUTPATIENT
Start: 2022-09-17 | End: 2022-09-18

## 2022-09-17 RX ORDER — ONDANSETRON 2 MG/ML
4 INJECTION INTRAMUSCULAR; INTRAVENOUS ONCE
Status: COMPLETED | OUTPATIENT
Start: 2022-09-17 | End: 2022-09-17

## 2022-09-17 RX ORDER — CARVEDILOL 6.25 MG/1
12.5 TABLET ORAL 2 TIMES DAILY WITH MEALS
Status: DISCONTINUED | OUTPATIENT
Start: 2022-09-17 | End: 2022-09-19 | Stop reason: HOSPADM

## 2022-09-17 RX ORDER — TEMAZEPAM 15 MG/1
15 CAPSULE ORAL NIGHTLY PRN
Status: DISCONTINUED | OUTPATIENT
Start: 2022-09-17 | End: 2022-09-19 | Stop reason: HOSPADM

## 2022-09-17 RX ADMIN — ALPRAZOLAM 0.25 MG: 0.25 TABLET ORAL at 20:36

## 2022-09-17 RX ADMIN — HYDROCODONE BITARTRATE AND ACETAMINOPHEN 1 TABLET: 5; 325 TABLET ORAL at 20:36

## 2022-09-17 RX ADMIN — CITALOPRAM HYDROBROMIDE 20 MG: 20 TABLET ORAL at 20:31

## 2022-09-17 RX ADMIN — ATORVASTATIN CALCIUM 80 MG: 40 TABLET, FILM COATED ORAL at 20:31

## 2022-09-17 RX ADMIN — MORPHINE SULFATE 4 MG: 4 INJECTION, SOLUTION INTRAMUSCULAR; INTRAVENOUS at 22:06

## 2022-09-17 RX ADMIN — EPTIFIBATIDE 2 MCG/KG/MIN: 75 INJECTION INTRAVENOUS at 20:41

## 2022-09-17 RX ADMIN — LISINOPRIL 5 MG: 5 TABLET ORAL at 17:15

## 2022-09-17 RX ADMIN — MORPHINE SULFATE 4 MG: 4 INJECTION, SOLUTION INTRAMUSCULAR; INTRAVENOUS at 13:19

## 2022-09-17 RX ADMIN — CARVEDILOL 12.5 MG: 6.25 TABLET, FILM COATED ORAL at 18:26

## 2022-09-17 RX ADMIN — ONDANSETRON 4 MG: 2 INJECTION INTRAMUSCULAR; INTRAVENOUS at 19:53

## 2022-09-17 RX ADMIN — TICAGRELOR 90 MG: 90 TABLET ORAL at 20:32

## 2022-09-17 RX ADMIN — ONDANSETRON 4 MG: 2 INJECTION INTRAMUSCULAR; INTRAVENOUS at 13:17

## 2022-09-17 RX ADMIN — THYROID, PORCINE 30 MG: 15 TABLET ORAL at 17:15

## 2022-09-17 NOTE — PROGRESS NOTES
"Drug-Drug Interaction    Girma Chapman is a  42 y.o. male.  Height - 175.3 cm (69\")  Weight - 83.9 kg (185 lb)    BMI (Body Mass Index) = 27.32 kg/m²        Title   Selective Serotonin Reuptake Inhibitors / Citalopram    Risk Rating   C: Monitor therapy    Summary   Citalopram may enhance the antiplatelet effect of Selective Serotonin Reuptake Inhibitors. Citalopram may enhance the serotonergic effect of Selective Serotonin Reuptake Inhibitors. This could result in serotonin syndrome.   Severity Major   Reliability Rating Good    Patient Management   The concomitant use of 2 drugs from the same therapeutic class (ie, 2 selective serotonin reuptake inhibitors [SSRIs]) is typically not recommended. If combined, monitor closely for signs and symptoms of serotonin syndrome/serotonin toxicity (eg, hyperreflexia, clonus, hyperthermia, diaphoresis, tremor, autonomic instability, mental status changes). Consider alternatives in patients with other risk factors (eg, high drug concentrations/doses, greater numbers of serotonergic agents) who are likely at an even greater risk for these potentially life-threatening toxicities. In addition, monitor for signs and symptoms of bleeding.    Selective Serotonin Reuptake Inhibitors Interacting Members   Citalopram; PARoxetine; Sertraline; Vilazodone; Vortioxetine Exceptions (agents listed are discussed in separate interaction monograph[s] or are non-interacting) Dapoxetine; Escitalopram; FLUoxetine; FluvoxaMINE    Discussion   Data characterizing serotonergic risks of combining citalopram with other selective serotonin reuptake inhibitors (SSRIs) are not available. However, there is evidence that SSRIs can contribute to the development of serotonin syndrome/serotonin toxicity (SS/ST), and a representative selection of that evidence is described here. Numerous case reports describe patients who were stable on either no serotonergic therapy or single serotonergic agent (eg, SNRI, TCA, " MAOI) therapy who developed SS/ST after the addition of a SSRI or an increase in SSRI dose.1,2,3    Many drugs possess an ability to enhance central serotonergic activity (eg, inhibition of serotonin reuptake, decreased serotonin metabolism, direct serotonin receptor stimulation) either as an intended mechanism of action or as an additional effect. The concomitant use of 2 or more of these agents may increase the risk for serotonin toxicity (also called serotonin syndrome), a condition of serotonergic overstimulation characterized by autonomic, neuromuscular, and neurologic effects.4,5,6    In addition to the risk of SS/ST, the use of 2 SSRIs may also increase the risk for bleeding since these agents impair platelet function.7,8,9,10,11    Footnotes  1. Kulwinder P, Dacia PF, Quinten D, Rajani P. Serotonin syndrome after small doses of citalopram or sertraline. J Clin Psychopharmacol. 2000;20(6):713-714. [PubMed [id number]]  2. Lizzette G, Mayra J. Repetition of serotonin syndrome after reexposure to SSRI-- a case report. Pharmacopsychiatry. 2004;37(5):236-238. [PubMed [id number]]  3. Ortega WP, Maris JH, Bloom MT, Odom CT, Oliver HW. Citalopram-induced serotonin syndrome: a case report. [facility] J Med Sci. 2005;21(7):326-328. [PubMed [id number]]  4. Karen ZEPEDA, Mervat RENEE. The serotonin syndrome. N Engl J Med. 2005;352(11):7858-6582. [PubMed [id number]]  5. Hayes EJ, Nicolás GK, Gold D, Agustín AH, Rachael IM. The Ryan Serotonin Toxicity Criteria: simple and accurate diagnostic decision rules for serotonin toxicity. QJM. 2003;96(9):635-642. [PubMed [id number]]  6. Juan H. The serotonin syndromes. Am J Psychiatry. 1991;148(6):705-713. [PubMed [id number]]  7. Celexa (citalopram) [prescribing information]. [place], [place]: [employer] December 2018.  8. Paxil (paroxetine) [prescribing information]. [place], [place]: [employer]; January 2017.  9. Zoloft (sertraline) [prescribing information]. [place], [place]:  [employer]; December 2017.  10. Viibryd (vilazodone) [prescribing information]. [place], [place]: [employer] January 2017.  11. Trintellix (vortioxetine) [prescribing information]. [place], [place]: [employer] July 2019.

## 2022-09-17 NOTE — PLAN OF CARE
Problem: Adult Inpatient Plan of Care  Goal: Plan of Care Review  Outcome: Ongoing, Progressing  Goal: Patient-Specific Goal (Individualized)  Outcome: Ongoing, Progressing  Goal: Absence of Hospital-Acquired Illness or Injury  Outcome: Ongoing, Progressing  Intervention: Identify and Manage Fall Risk  Recent Flowsheet Documentation  Taken 9/17/2022 1900 by Viv Elliott, RN  Safety Promotion/Fall Prevention:   activity supervised   assistive device/personal items within reach   fall prevention program maintained   lighting adjusted   nonskid shoes/slippers when out of bed   room organization consistent   safety round/check completed  Taken 9/17/2022 1800 by Vvi Elliott, RN  Safety Promotion/Fall Prevention:   activity supervised   assistive device/personal items within reach   clutter free environment maintained   fall prevention program maintained   lighting adjusted  Taken 9/17/2022 1700 by Viv Elliott, RN  Safety Promotion/Fall Prevention:   activity supervised   lighting adjusted   nonskid shoes/slippers when out of bed   room organization consistent   safety round/check completed   toileting scheduled  Taken 9/17/2022 1600 by Viv Elliott, RN  Safety Promotion/Fall Prevention:   activity supervised   assistive device/personal items within reach   clutter free environment maintained   fall prevention program maintained   lighting adjusted   nonskid shoes/slippers when out of bed   room organization consistent   safety round/check completed   toileting scheduled  Taken 9/17/2022 1545 by Viv Elliott, RN  Safety Promotion/Fall Prevention:   activity supervised   assistive device/personal items within reach   clutter free environment maintained   fall prevention program maintained   lighting adjusted   nonskid shoes/slippers when out of bed   room organization consistent   safety round/check completed  Intervention: Prevent Skin Injury  Recent Flowsheet Documentation  Taken 9/17/2022 1900 by  Viv Elliott RN  Body Position:   turned   position changed independently  Taken 9/17/2022 1800 by Viv Elliott RN  Body Position:   30 degrees   position changed independently  Taken 9/17/2022 1700 by Viv Elliott RN  Body Position: position changed independently  Taken 9/17/2022 1600 by Viv Elliott RN  Body Position: turned  Taken 9/17/2022 1545 by Viv Elliott RN  Body Position: turned  Intervention: Prevent and Manage VTE (Venous Thromboembolism) Risk  Recent Flowsheet Documentation  Taken 9/17/2022 1900 by Viv Elliott RN  Activity Management: bedrest  Taken 9/17/2022 1800 by Viv Elliott RN  Activity Management: bedrest  Taken 9/17/2022 1700 by Viv Elliott RN  Activity Management: bedrest  Taken 9/17/2022 1600 by Viv Elliott RN  Activity Management: bedrest  Taken 9/17/2022 1545 by Viv Elliott RN  Activity Management: bedrest  Intervention: Prevent Infection  Recent Flowsheet Documentation  Taken 9/17/2022 1600 by Viv Elliott RN  Infection Prevention:   environmental surveillance performed   equipment surfaces disinfected   hand hygiene promoted   personal protective equipment utilized   rest/sleep promoted   single patient room provided  Goal: Optimal Comfort and Wellbeing  Outcome: Ongoing, Progressing  Intervention: Provide Person-Centered Care  Recent Flowsheet Documentation  Taken 9/17/2022 1545 by Viv Elliott RN  Trust Relationship/Rapport:   care explained   choices provided   emotional support provided   empathic listening provided   questions answered   reassurance provided   questions encouraged   thoughts/feelings acknowledged  Goal: Readiness for Transition of Care  Outcome: Ongoing, Progressing  Intervention: Mutually Develop Transition Plan  Recent Flowsheet Documentation  Taken 9/17/2022 1640 by Viv Elliott, LATHA  Equipment Currently Used at Home: none  Transportation Anticipated: family or friend will provide  Patient/Family Anticipated  Services at Transition: none  Patient/Family Anticipates Transition to:   home   home with family   Goal Outcome Evaluation:

## 2022-09-17 NOTE — H&P
Patient Care Team:  Gabriel Mandel PA as PCP - General (Family Medicine)    Chief complaint : Chest pain    Subjective     Patient is a 42 y.o. male presents with known coronary artery disease (stenting of the proximal RCA in July 2021-drug-eluting stent) who presents to the emergency room with acute onset of severe anterior chest pressure/heaviness.  The discomfort did not radiate.  It was associated with shortness of breath.  The discomfort had 8/10 intensity on arrival.  The discomfort decreased to 3/10 intensity with sublingual nitroglycerin.  Initial twelve-lead electrocardiogram showed inferior Q waves with subtle ST segment elevation but no reciprocal ST segment depression.  The patient's chest discomfort intensified and repeat twelve-lead electrocardiogram showed prominent ST segment elevation in the inferior leads with reciprocal ST segment depression and the high lateral leads.  The patient indicates the quality of his discomfort is identical to that which he experienced with his acute coronary syndrome last year.      Review of Systems   Pertinent items are noted in HPI, all other systems reviewed and negative    History  Past Medical History:   Diagnosis Date   • Anxiety    • Depression    • Glaucoma    • Hypertension    • Screening for neurological condition    • Seizures (HCC)    • Self-injurious behavior    • Stroke (HCC)      Past Surgical History:   Procedure Laterality Date   • CARDIAC CATHETERIZATION N/A 7/14/2021    Procedure: CORONARY ANGIOGRAPHY;  Surgeon: Farhad Grady MD;  Location: [facility] CATH INVASIVE LOCATION;  Service: Cardiology;  Laterality: N/A;   • CARDIAC CATHETERIZATION N/A 7/14/2021    Procedure: Percutaneous Coronary Intervention;  Surgeon: Farhad Grady MD;  Location: [facility] CATH INVASIVE LOCATION;  Service: Cardiology;  Laterality: N/A;   • CARDIAC CATHETERIZATION N/A 7/14/2021    Procedure: Optical Coherent Tomography;  Surgeon: Farhad Grady MD;   Location: [facility] INVASIVE LOCATION;  Service: Cardiology;  Laterality: N/A;   • FRACTURE SURGERY Right     ankle     Family History   Problem Relation Age of Onset   • Dementia Maternal Grandmother    • Anxiety disorder Maternal Grandmother    • Alcohol abuse Mother    • Anxiety disorder Mother    • Alcohol abuse Father    • Drug abuse Father    • Seizures Father    • OCD Maternal Grandfather    • Drug abuse Paternal Grandfather    • Drug abuse Paternal Grandmother    • Schizophrenia Cousin    • ADD / ADHD Neg Hx    • Bipolar disorder Neg Hx    • Paranoid behavior Neg Hx    • Self-Injurious Behavior  Neg Hx    • Suicide Attempts Neg Hx      Social History     Tobacco Use   • Smoking status: Former Smoker     Types: Cigarettes     Quit date:      Years since quittin.7   • Smokeless tobacco: Former User     Types: Snuff     Quit date:    Vaping Use   • Vaping Use: Never used   Substance Use Topics   • Alcohol use: Yes     Comment: OCC/ once a month   • Drug use: Yes     Types: Marijuana     Comment: RARELY     E-cigarette/Vaping   • E-cigarette/Vaping Use Never User      E-cigarette/Vaping Substances   • Nicotine No    • THC No    • CBD No    • Flavoring No      (Not in a hospital admission)    Allergies:  Patient has no known allergies.    Objective     Vital Signs  Temp:  [98.1 °F (36.7 °C)] 98.1 °F (36.7 °C)  Heart Rate:  [0-66] 0  Resp:  [18] 18  BP: (102)/(61) 102/61    Physical Exam:      General Appearance:    Alert, cooperative, in no acute distress   Head:    Normocephalic, without obvious abnormality, atraumatic   Eyes:            Lids and lashes normal, conjunctivae and sclerae normal, no   icterus, no pallor, corneas clear, PERRLA   Ears:    Ears appear intact with no abnormalities noted   Throat:   No oral lesions, no thrush, oral mucosa moist   Neck:   No adenopathy, supple, trachea midline, no thyromegaly, no   carotid bruit, no JVD   Back:     No kyphosis present, no scoliosis  present, no skin lesions,      erythema or scars, no tenderness to percussion or                   palpation,   range of motion normal   Lungs:     Clear to auscultation,respirations regular, even and                  unlabored    Heart:    Regular rhythm and normal rate, normal S1 and S2, no            murmur, no gallop, no rub, no click   Chest Wall:    No abnormalities observed   Abdomen:     Normal bowel sounds, no masses, no organomegaly, soft        non-tender, non-distended, no guarding, no rebound                tenderness   Rectal:     Deferred   Extremities:   Moves all extremities well, no edema, no cyanosis, no             redness   Pulses:   Pulses palpable and equal bilaterally   Skin:   No bleeding, bruising or rash   Lymph nodes:   No palpable adenopathy   Neurologic:   Cranial nerves 2 - 12 grossly intact, sensation intact, DTR       present and equal bilaterally     Results Review:    I reviewed the patient's new imaging results and agree with the interpretation.    Assessment & Plan     Acute inferior ST elevation myocardial infarction    I have recommended emergency coronary angiography with catheter-based standby.  Mr. Chapman has been engaged in a patient level discussion explaining the rationale for proceeding with invasive testing/procedures.  The procedure of coronary angiography with catheter-based coronary revascularization has been explained in detail, using layman's terminology, including risks benefits and alternatives.  He expresses understanding and a desire to proceed.  Further recommendations will be predicated on the results of his catheterization findings.    I discussed the patients findings and my recommendations with patient.     René Solomon MD  09/17/22  13:21 EDT

## 2022-09-17 NOTE — ED PROVIDER NOTES
Subjective   History of Present Illness  Chief Complaint: Chest pain  History of Present Illness: 50-year-old male comes in for evaluation of above complaint.  He states about 830 to 9:00 this morning he awoke and noted a right-sided chest pressure.  Spread across the left-sided chest and down to his left upper arm.  He states the pain in his upper arm on the left is squeezing.  He states he did have some diaphoresis but no nausea or short of breath.  Called EMS was administer 1 nitroglycerin sublingual and 1 aspirin and brought here.  He has a history of a STEMI and July 2001 with a stent placed.  He is on dual antiplatelet therapy with Plavix and aspirin as well as carvedilol and lisinopril.  Onset: 830 to 9 AM  Timing: Ongoing, slightly improved  Exacerbating / Alleviating factors: None  Associated symptoms: None      Nurses Notes reviewed and agree, including vitals, allergies, social history and prior medical history.        Review of Systems   Constitutional: Negative.    HENT: Negative.    Eyes: Negative.    Respiratory: Negative.    Cardiovascular: Positive for chest pain.   Gastrointestinal: Negative.    Genitourinary: Negative.    Musculoskeletal: Negative.    Skin: Negative.    Allergic/Immunologic: Negative.    Neurological: Negative.    Psychiatric/Behavioral: Negative.    All other systems reviewed and are negative.      Past Medical History:   Diagnosis Date   • Anxiety    • Depression    • Glaucoma    • Hypertension    • Screening for neurological condition    • Seizures (HCC)    • Self-injurious behavior    • Stroke (HCC)        No Known Allergies    Past Surgical History:   Procedure Laterality Date   • CARDIAC CATHETERIZATION N/A 7/14/2021    Procedure: CORONARY ANGIOGRAPHY;  Surgeon: Farhad Grady MD;  Location: [facility] CATH INVASIVE LOCATION;  Service: Cardiology;  Laterality: N/A;   • CARDIAC CATHETERIZATION N/A 7/14/2021    Procedure: Percutaneous Coronary Intervention;  Surgeon: Miguel A  Farhad Kearney MD;  Location:  [facility] CATH INVASIVE LOCATION;  Service: Cardiology;  Laterality: N/A;   • CARDIAC CATHETERIZATION N/A 2021    Procedure: Optical Coherent Tomography;  Surgeon: Farhad Grady MD;  Location: [facility] CATH INVASIVE LOCATION;  Service: Cardiology;  Laterality: N/A;   • FRACTURE SURGERY Right     ankle       Family History   Problem Relation Age of Onset   • Dementia Maternal Grandmother    • Anxiety disorder Maternal Grandmother    • Alcohol abuse Mother    • Anxiety disorder Mother    • Alcohol abuse Father    • Drug abuse Father    • Seizures Father    • OCD Maternal Grandfather    • Drug abuse Paternal Grandfather    • Drug abuse Paternal Grandmother    • Schizophrenia Cousin    • ADD / ADHD Neg Hx    • Bipolar disorder Neg Hx    • Paranoid behavior Neg Hx    • Self-Injurious Behavior  Neg Hx    • Suicide Attempts Neg Hx        Social History     Socioeconomic History   • Marital status: Single   Tobacco Use   • Smoking status: Former Smoker     Types: Cigarettes     Quit date:      Years since quittin.7   • Smokeless tobacco: Former User     Types: Snuff     Quit date:    Vaping Use   • Vaping Use: Never used   Substance and Sexual Activity   • Alcohol use: Yes     Comment: OCC/ once a month   • Drug use: Yes     Types: Marijuana     Comment: RARELY   • Sexual activity: Defer           Objective   Physical Exam  Vitals and nursing note reviewed.   Constitutional:       General: He is not in acute distress.     Appearance: He is well-developed and normal weight. He is not ill-appearing, toxic-appearing or diaphoretic.   HENT:      Head: Normocephalic and atraumatic.   Cardiovascular:      Rate and Rhythm: Normal rate and regular rhythm.      Heart sounds: Normal heart sounds.   Pulmonary:      Effort: Pulmonary effort is normal.      Breath sounds: Normal breath sounds.   Abdominal:      Palpations: Abdomen is soft.   Musculoskeletal:         General: Normal range of  motion.      Cervical back: Normal range of motion.   Skin:     General: Skin is warm and dry.   Neurological:      General: No focal deficit present.      Mental Status: He is alert.   Psychiatric:         Mood and Affect: Mood normal.         Behavior: Behavior normal.         Procedures           ED Course  ED Course as of 09/17/22 1315   Sat Sep 17, 2022   1231 EKG interpreted by me.  Sinus rhythm.  Rate of 60.  Nonspecific ST segment changes.  Minimal elevation in leads II, III and aVF.  Minimal depressions in leads aVL.  Appears similar to previous. No acute RADHA.   Discussed with cardiology on call. They agree.  [CG]   1314 Repeat EKG interpreted by me.  Sinus rhythm.  Worsening ST segment elevation in inferior leads with reciprocal changes.  Discussed with Dr. Solomon.  Cardiac catheterization lab activated at this time. [CG]      ED Course User Index  [CG] Cristo Tafoya, DO                                           [facility]  Patient states chest pain is improved some but now has more of a shooting pain down the left upper extremity at 1315.  Repeat EKG shows new elevation in the inferior leads concerning for STEMI, Dr. Solomon contacted, he recommends activation of Cath Lab.  Recommends only morphine to be administered in addition to what is been on at this point.  Patient updated and is comfortable with the plan.  Final diagnoses:   ST elevation myocardial infarction (STEMI), unspecified artery (HCC)       ED Disposition  ED Disposition     ED Disposition   Send to Cath Lab    Condition   --    Comment   --             No follow-up provider specified.       Medication List      No changes were made to your prescriptions during this visit.          Jus Mathews PA-C  09/17/22 1315

## 2022-09-18 ENCOUNTER — APPOINTMENT (OUTPATIENT)
Dept: CARDIOLOGY | Facility: HOSPITAL | Age: 42
End: 2022-09-18

## 2022-09-18 PROBLEM — R11.2 NAUSEA AND VOMITING: Status: ACTIVE | Noted: 2022-09-18

## 2022-09-18 LAB
ANION GAP SERPL CALCULATED.3IONS-SCNC: 13.5 MMOL/L (ref 5–15)
BUN SERPL-MCNC: 10 MG/DL (ref 6–20)
BUN/CREAT SERPL: 8.8 (ref 7–25)
CALCIUM SPEC-SCNC: 9.7 MG/DL (ref 8.6–10.5)
CHLORIDE SERPL-SCNC: 94 MMOL/L (ref 98–107)
CHOLEST SERPL-MCNC: 235 MG/DL (ref 0–200)
CO2 SERPL-SCNC: 26.5 MMOL/L (ref 22–29)
CREAT SERPL-MCNC: 1.13 MG/DL (ref 0.76–1.27)
DEPRECATED RDW RBC AUTO: 45.7 FL (ref 37–54)
EGFRCR SERPLBLD CKD-EPI 2021: 84.3 ML/MIN/1.73
ERYTHROCYTE [DISTWIDTH] IN BLOOD BY AUTOMATED COUNT: 14 % (ref 12.3–15.4)
GLUCOSE SERPL-MCNC: 129 MG/DL (ref 65–99)
HBA1C MFR BLD: 5 % (ref 4.8–5.6)
HCT VFR BLD AUTO: 55.7 % (ref 37.5–51)
HDLC SERPL-MCNC: 51 MG/DL (ref 40–60)
HGB BLD-MCNC: 19.4 G/DL (ref 13–17.7)
LDLC SERPL CALC-MCNC: 174 MG/DL (ref 0–100)
LDLC/HDLC SERPL: 3.37 {RATIO}
LIPASE SERPL-CCNC: 15 U/L (ref 13–60)
MCH RBC QN AUTO: 31.4 PG (ref 26.6–33)
MCHC RBC AUTO-ENTMCNC: 34.8 G/DL (ref 31.5–35.7)
MCV RBC AUTO: 90.1 FL (ref 79–97)
PLATELET # BLD AUTO: 433 10*3/MM3 (ref 140–450)
PMV BLD AUTO: 9 FL (ref 6–12)
POTASSIUM SERPL-SCNC: 4.1 MMOL/L (ref 3.5–5.2)
RBC # BLD AUTO: 6.18 10*6/MM3 (ref 4.14–5.8)
SODIUM SERPL-SCNC: 134 MMOL/L (ref 136–145)
TRIGL SERPL-MCNC: 61 MG/DL (ref 0–150)
VLDLC SERPL-MCNC: 10 MG/DL (ref 5–40)
WBC NRBC COR # BLD: 30.87 10*3/MM3 (ref 3.4–10.8)

## 2022-09-18 PROCEDURE — 80061 LIPID PANEL: CPT | Performed by: INTERNAL MEDICINE

## 2022-09-18 PROCEDURE — 25010000002 PROCHLORPERAZINE 10 MG/2ML SOLUTION: Performed by: INTERNAL MEDICINE

## 2022-09-18 PROCEDURE — 25010000002 EPTIFIBATIDE PER 5 MG: Performed by: INTERNAL MEDICINE

## 2022-09-18 PROCEDURE — 93306 TTE W/DOPPLER COMPLETE: CPT

## 2022-09-18 PROCEDURE — 80048 BASIC METABOLIC PNL TOTAL CA: CPT | Performed by: INTERNAL MEDICINE

## 2022-09-18 PROCEDURE — 99232 SBSQ HOSP IP/OBS MODERATE 35: CPT | Performed by: INTERNAL MEDICINE

## 2022-09-18 PROCEDURE — 25010000002 DIPHENHYDRAMINE PER 50 MG: Performed by: INTERNAL MEDICINE

## 2022-09-18 PROCEDURE — 63710000001 ONDANSETRON PER 8 MG: Performed by: INTERNAL MEDICINE

## 2022-09-18 PROCEDURE — 83690 ASSAY OF LIPASE: CPT | Performed by: INTERNAL MEDICINE

## 2022-09-18 PROCEDURE — 25010000002 ONDANSETRON PER 1 MG: Performed by: INTERNAL MEDICINE

## 2022-09-18 PROCEDURE — 93306 TTE W/DOPPLER COMPLETE: CPT | Performed by: INTERNAL MEDICINE

## 2022-09-18 PROCEDURE — 83036 HEMOGLOBIN GLYCOSYLATED A1C: CPT | Performed by: INTERNAL MEDICINE

## 2022-09-18 PROCEDURE — 25010000002 ENOXAPARIN PER 10 MG: Performed by: INTERNAL MEDICINE

## 2022-09-18 PROCEDURE — 85027 COMPLETE CBC AUTOMATED: CPT | Performed by: INTERNAL MEDICINE

## 2022-09-18 RX ORDER — LISINOPRIL 10 MG/1
10 TABLET ORAL DAILY
Status: DISCONTINUED | OUTPATIENT
Start: 2022-09-19 | End: 2022-09-19 | Stop reason: HOSPADM

## 2022-09-18 RX ORDER — ALUMINA, MAGNESIA, AND SIMETHICONE 2400; 2400; 240 MG/30ML; MG/30ML; MG/30ML
15 SUSPENSION ORAL EVERY 6 HOURS PRN
Status: DISCONTINUED | OUTPATIENT
Start: 2022-09-18 | End: 2022-09-19 | Stop reason: HOSPADM

## 2022-09-18 RX ORDER — ENOXAPARIN SODIUM 100 MG/ML
40 INJECTION SUBCUTANEOUS DAILY
Status: DISCONTINUED | OUTPATIENT
Start: 2022-09-18 | End: 2022-09-19 | Stop reason: HOSPADM

## 2022-09-18 RX ORDER — PANTOPRAZOLE SODIUM 40 MG/10ML
40 INJECTION, POWDER, LYOPHILIZED, FOR SOLUTION INTRAVENOUS
Status: DISCONTINUED | OUTPATIENT
Start: 2022-09-18 | End: 2022-09-19 | Stop reason: HOSPADM

## 2022-09-18 RX ORDER — PROCHLORPERAZINE EDISYLATE 5 MG/ML
5 INJECTION INTRAMUSCULAR; INTRAVENOUS EVERY 6 HOURS PRN
Status: DISCONTINUED | OUTPATIENT
Start: 2022-09-18 | End: 2022-09-19 | Stop reason: HOSPADM

## 2022-09-18 RX ORDER — SODIUM CHLORIDE 9 MG/ML
INJECTION, SOLUTION INTRAVENOUS
Status: COMPLETED
Start: 2022-09-18 | End: 2022-09-18

## 2022-09-18 RX ADMIN — ALPRAZOLAM 0.25 MG: 0.25 TABLET ORAL at 09:26

## 2022-09-18 RX ADMIN — PANTOPRAZOLE SODIUM 40 MG: 40 INJECTION, POWDER, FOR SOLUTION INTRAVENOUS at 17:18

## 2022-09-18 RX ADMIN — EPTIFIBATIDE 2 MCG/KG/MIN: 75 INJECTION INTRAVENOUS at 05:38

## 2022-09-18 RX ADMIN — APIXABAN 5 MG: 5 TABLET, FILM COATED ORAL at 12:07

## 2022-09-18 RX ADMIN — ONDANSETRON 4 MG: 2 INJECTION INTRAMUSCULAR; INTRAVENOUS at 09:25

## 2022-09-18 RX ADMIN — THYROID, PORCINE 30 MG: 15 TABLET ORAL at 09:25

## 2022-09-18 RX ADMIN — LISINOPRIL 5 MG: 5 TABLET ORAL at 09:26

## 2022-09-18 RX ADMIN — APIXABAN 5 MG: 5 TABLET, FILM COATED ORAL at 20:43

## 2022-09-18 RX ADMIN — ENOXAPARIN SODIUM 40 MG: 40 INJECTION SUBCUTANEOUS at 12:07

## 2022-09-18 RX ADMIN — ASPIRIN 81 MG: 81 TABLET, COATED ORAL at 09:26

## 2022-09-18 RX ADMIN — TICAGRELOR 90 MG: 90 TABLET ORAL at 09:26

## 2022-09-18 RX ADMIN — ATORVASTATIN CALCIUM 80 MG: 40 TABLET, FILM COATED ORAL at 20:42

## 2022-09-18 RX ADMIN — HYDROCODONE BITARTRATE AND ACETAMINOPHEN 1 TABLET: 5; 325 TABLET ORAL at 20:44

## 2022-09-18 RX ADMIN — TEMAZEPAM 15 MG: 15 CAPSULE ORAL at 21:48

## 2022-09-18 RX ADMIN — TICAGRELOR 90 MG: 90 TABLET ORAL at 20:43

## 2022-09-18 RX ADMIN — ACETAMINOPHEN 650 MG: 325 TABLET, FILM COATED ORAL at 01:16

## 2022-09-18 RX ADMIN — CITALOPRAM HYDROBROMIDE 20 MG: 20 TABLET ORAL at 09:26

## 2022-09-18 RX ADMIN — DIPHENHYDRAMINE HYDROCHLORIDE 25 MG: 50 INJECTION, SOLUTION INTRAMUSCULAR; INTRAVENOUS at 07:12

## 2022-09-18 RX ADMIN — ONDANSETRON HYDROCHLORIDE 4 MG: 4 TABLET, FILM COATED ORAL at 20:43

## 2022-09-18 RX ADMIN — CARVEDILOL 12.5 MG: 6.25 TABLET, FILM COATED ORAL at 17:18

## 2022-09-18 RX ADMIN — ALPRAZOLAM 0.25 MG: 0.25 TABLET ORAL at 20:43

## 2022-09-18 RX ADMIN — CARVEDILOL 12.5 MG: 6.25 TABLET, FILM COATED ORAL at 09:25

## 2022-09-18 RX ADMIN — ONDANSETRON 4 MG: 2 INJECTION INTRAMUSCULAR; INTRAVENOUS at 02:08

## 2022-09-18 RX ADMIN — TEMAZEPAM 15 MG: 15 CAPSULE ORAL at 01:16

## 2022-09-18 RX ADMIN — Medication 10 ML: at 09:24

## 2022-09-18 RX ADMIN — SODIUM CHLORIDE 50 ML: 9 INJECTION, SOLUTION INTRAVENOUS at 07:13

## 2022-09-18 RX ADMIN — PROCHLORPERAZINE EDISYLATE 5 MG: 5 INJECTION INTRAMUSCULAR; INTRAVENOUS at 12:38

## 2022-09-18 NOTE — PROGRESS NOTES
"[facility]-Cardiology Progress note     LOS: 1 day   Patient Care Team:  Gabriel Mandel PA as PCP - General (Family Medicine)    Chief Complaint: Chest pain    Subjective:    Interval History: Overnight, the patient has had no recurrent chest discomfort.  He has been experiencing severe nausea and vomiting refractory to Zofran.  No cardiac arrhythmia has been noted.  He has manifest no signs or symptoms of clinical congestive heart failure.  ST segments on twelve-lead electrocardiogram have returned to baseline.  He is remained afebrile; however, white blood cell count has increased to just over 30,000.  Hematocrit is greater than 55%.  He has a pre-existing diagnosis of polycythemia vera.    Patient Complaints: Nausea and vomiting  Patient Denies:   Chest pain, shortness of breath, orthopnea, peripheral edema, palpitations, cough, sputum production, hemoptysis, abdominal pain, diarrhea, fevers chills or night sweats  History taken from: patient family    Review of Systems:   All systems were reviewed and negative except for:  Gastrointestinal: positive for  nausea and vomiting      Objective:    Vital Sign Min/Max for last 24 hours  Temp  Min: 98 °F (36.7 °C)  Max: 98.5 °F (36.9 °C)   BP  Min: 102/61  Max: 225/117   Pulse  Min: 0  Max: 94   Resp  Min: 12  Max: 20   SpO2  Min: 92 %  Max: 100 %   Flow (L/min)  Min: 2  Max: 2   Weight  Min: 82.7 kg (182 lb 5.1 oz)  Max: 83.9 kg (185 lb)     Flowsheet Rows    Flowsheet Row First Filed Value   Admission Height 175.3 cm (69\") Documented at 09/17/2022 1214   Admission Weight 83.9 kg (185 lb) Documented at 09/17/2022 1214          Physical Exam:     General Appearance:    Alert, cooperative, in no acute distress   Head:    Normocephalic, without obvious abnormality, atraumatic   Eyes:            Lids and lashes normal, conjunctivae and sclerae normal, no   icterus, no pallor, corneas clear, PERRLA   Ears:    Ears appear intact with no abnormalities noted   Throat:   No oral " lesions, no thrush, oral mucosa moist   Neck:   No adenopathy, supple, trachea midline, no thyromegaly, no   carotid bruit, no JVD   Back:     No kyphosis present, no scoliosis present, no skin lesions,      erythema or scars, no tenderness to percussion or                   palpation,   range of motion normal   Lungs:     Clear to auscultation,respirations regular, even and                  unlabored    Heart:    Regular rhythm and normal rate, normal S1 and S2, no            murmur, no gallop, no rub, no click   Chest Wall:    No abnormalities observed   Abdomen:     Normal bowel sounds, no masses, no organomegaly, soft        non-tender, non-distended, no guarding, no rebound                tenderness   Rectal:     Deferred   Extremities:   Moves all extremities well, no edema, no cyanosis, no             redness   Pulses:   Pulses palpable and equal bilaterally   Skin:   No bleeding, bruising or rash   Lymph nodes:   No palpable adenopathy   Neurologic:   Cranial nerves 2 - 12 grossly intact, sensation intact, DTR       present and equal bilaterally        Results Review:     I reviewed the patient's new clinical results.  Results from last 7 days   Lab Units 09/18/22  0455   HEMOGLOBIN A1C % 5.00     Results from last 7 days   Lab Units 09/18/22  0455   SODIUM mmol/L 134*   POTASSIUM mmol/L 4.1   CHLORIDE mmol/L 94*   CO2 mmol/L 26.5   BUN mg/dL 10   CREATININE mg/dL 1.13   GLUCOSE mg/dL 129*   CALCIUM mg/dL 9.7     Results from last 7 days   Lab Units 09/18/22  0455 09/17/22  1219   WBC 10*3/mm3 30.87* 23.33*   HEMOGLOBIN g/dL 19.4* 17.2   HEMATOCRIT % 55.7* 49.2   PLATELETS 10*3/mm3 433 415           Echo EF Estimated  Lab Results   Component Value Date    ECHOEFEST 55 07/15/2021       Physical Exam    Medication Review: yes    Assessment/Plan:      ST elevation myocardial infarction involving right coronary artery (HCC)    ST elevation myocardial infarction (STEMI), unspecified artery (HCC)      Successful  "PCI of multiple areas of coronary thrombosis within the right coronary artery.  Concern regarding possible \"hypercoagulable state\".  For this reason the patient will be discharged to home on enteric-coated baby aspirin 81 mg once per day, Brilinta 90 mg orally twice daily and at least 2 months of Eliquis 5 mg orally twice daily.    Development of nausea and vomiting overnight which was not part of his ACS presentation.  Hospitalist service has been consulted to evaluate.    Severe leukocytosis in the context of a previous diagnosis of polycythemia vera.  The magnitude of his WBC elevation is inconsistent with recent myocardial infarction and suggests other pathology.  Hospitalist service has been consulted to evaluate.    The patient may require inpatient hematology evaluation prior to discharge.    The patient's LDL cholesterol is 176 mg/dL.  The patient indicates that he has not taken his statin based cholesterol lowering medication in 1 month.  I suspect the patient will require multiple cholesterol-lowering medications.  High intensity statin based cholesterol-lowering therapy has been reinitiated.  The patient will likely require addition of Praluent and/or Nexlizet as an outpatient.    Outpatient follow-up with Dr. Grady following hospital discharge-established cardiologist.    René Solomon MD  09/18/22  10:09 EDT        "

## 2022-09-18 NOTE — CONSULTS
[facility]      Name:  Girma Chapman   Age:  42 y.o.  Sex:  male  :  1980  MRN:  [id number]   Visit Number:  [id number]  Admission Date:  2022  Date Of Service:  22  Primary Care Physician:  Gabriel Mandel PA    Consulting Physician:    Bryant Davis MD    Referring Physician:    René Solomon MD    Reason For Consult:    Emesis, leukocytosis.    Chief Complaint:     Nausea and vomiting.    History Of Presenting Illness:      Mr. Chapman is a 42-year-old male with history of coronary artery disease status post RCA stent, history of polycythemia vera, tobacco abuse, history of CVA, hypertension, anxiety was admitted from the emergency room with inferior wall STEMI.  Patient was admitted by Dr. Solomon and was taken to the cardiac Cath Lab and did undergo 2 stents placement in the mid and distal RCA.  Patient has been subsequently started on aspirin, Brilinta as well as Eliquis.  Patient started having nausea and vomiting starting early this morning and has not been able to keep anything down.  He denies any abdominal pain.  No prior history of peptic ulcer disease.  He currently is sitting up on the bed and seems to be comfortable.  Denies any headache or chest pain.    Patient does have history of polycythemia and apparently used to see a hematologist in [place] but has not seen him in 3 years.  He also used to have regular phlebotomies for bloodletting but have not done those in the last 3 years.  Unfortunately continues to smoke.  Patient is currently afebrile and hemodynamically stable saturating at 95% on room air.  His lipase level is within normal limits.  LFTs done yesterday were within normal limits.    Review Of Systems:     All systems were reviewed and negative except for: Nausea and vomiting.    Past Medical History: Patient  has a past medical history of Anxiety, Depression, Glaucoma, Hypertension, Screening for neurological condition,  Seizures (HCC), Self-injurious behavior, and Stroke (HCC).    Past Surgical History: Patient  has a past surgical history that includes Fracture surgery (Right); Cardiac catheterization (N/A, 7/14/2021); Cardiac catheterization (N/A, 7/14/2021); and Cardiac catheterization (N/A, 7/14/2021).    Social History: Patient  reports that he quit smoking about 20 years ago. His smoking use included cigarettes. He quit smokeless tobacco use about 20 years ago.  His smokeless tobacco use included snuff. He reports current alcohol use. He reports current drug use. Drug: Marijuana.    Family History: Patient family history includes Alcohol abuse in his father and mother; Anxiety disorder in his maternal grandmother and mother; Dementia in his maternal grandmother; Drug abuse in his father, paternal grandfather, and paternal grandmother; OCD in his maternal grandfather; Schizophrenia in his cousin; Seizures in his father.     Allergies:      Patient has no known allergies.    Home Medications:    Prior to Admission Medications     Prescriptions Last Dose Informant Patient Reported? Taking?    aspirin 81 MG EC tablet More than a month  Yes No    Take 81 mg by mouth Daily.    atorvastatin (LIPITOR) 80 MG tablet 9/16/2022  No Yes    Take 1 tablet by mouth Every Night.    carvedilol (COREG) 6.25 MG tablet 9/17/2022  No Yes    Take 2 tablets by mouth 2 (Two) Times a Day With Meals.    citalopram (CeleXA) 20 MG tablet 9/16/2022 Self Yes Yes    Take 20 mg by mouth Daily.    dicyclomine (BENTYL) 20 MG tablet More than a month  No No    Take 1 tablet by mouth Every 6 (Six) Hours As Needed (Abdominal cramping).    folic acid (FOLVITE) 1 MG tablet More than a month  Yes No    Take 1,000 mcg by mouth Daily.    hydrOXYzine (ATARAX) 10 MG tablet 9/16/2022 Self Yes Yes    10 mg Every Night. PT STATES 20MG AT NIGHT    lisinopril (PRINIVIL,ZESTRIL) 5 MG tablet 9/17/2022  No Yes    Take 1 tablet by mouth Daily.    NP Thyroid 30 MG tablet  9/16/2022  Yes Yes    Take 30 mg by mouth Daily.         Medication Review:     I have reviewed the patient's active and prn medications.      Vital Signs:    Temp:  [97.9 °F (36.6 °C)-99.2 °F (37.3 °C)] 97.9 °F (36.6 °C)  Heart Rate:  [56-94] 79  Resp:  [12-20] 12  BP: (107-225)/() 107/81        09/17/22  1214 09/18/22  0502 09/18/22  1316   Weight: 83.9 kg (185 lb) 82.7 kg (182 lb 5.1 oz) 82.7 kg (182 lb 5.1 oz)     Body mass index is 26.91 kg/m².    Physical Exam:     General Appearance:  Alert and cooperative.   Head:  Atraumatic and normocephalic.   Eyes: Conjunctivae and sclerae normal, no icterus. No pallor.   Ears:  Ears with no abnormalities noted.   Throat: No oral lesions, no thrush, oral mucosa moist.   Neck: Supple, trachea midline, no thyromegaly.   Back:   No kyphoscoliosis present. No tenderness to palpation.   Lungs:   Breath sounds heard bilaterally equally.  No crackles or wheezing. No Pleural rub or bronchial breathing.   Heart:  Normal S1 and S2, no murmur, no gallop, no rub. No JVD.   Abdomen:   Normal bowel sounds, no masses, no organomegaly. Soft, nontender, nondistended, no rebound tenderness.   Extremities: Supple, no edema, no cyanosis, no clubbing.   Pulses: Pulses palpable bilaterally.   Skin: No bleeding or rash.  Multiple tattoos noted.   Neurologic: Alert and oriented x 3. No facial asymmetry. Moves all four limbs. No tremors.      Laboratory data:    Results from last 7 days   Lab Units 09/18/22  0455 09/17/22  1219   SODIUM mmol/L 134* 134*   POTASSIUM mmol/L 4.1 4.6   CHLORIDE mmol/L 94* 97*   CO2 mmol/L 26.5 26.2   BUN mg/dL 10 9   CREATININE mg/dL 1.13 1.04   CALCIUM mg/dL 9.7 8.9   BILIRUBIN mg/dL  --  0.5   ALK PHOS U/L  --  74   ALT (SGPT) U/L  --  12   AST (SGOT) U/L  --  13   GLUCOSE mg/dL 129* 141*     Results from last 7 days   Lab Units 09/18/22  0455 09/17/22  1219   WBC 10*3/mm3 30.87* 23.33*   HEMOGLOBIN g/dL 19.4* 17.2   HEMATOCRIT % 55.7* 49.2   PLATELETS  "10*3/mm3 433 415         Results from last 7 days   Lab Units 09/17/22  1555 09/17/22  1219   TROPONIN T ng/mL 1.220* <0.010       Results from last 7 days   Lab Units 09/18/22  0455   LIPASE U/L 15      Radiology:    Cardiac Catheterization/Vascular Study    Result Date: 9/17/2022  Recommendations: · Enteric-coated aspirin indefinitely · Uninterrupted dual antiplatelet therapy (enteric-coated baby aspirin plus Brilinta) for minimum of 30 months.  Consideration given to indefinite dual antiplatelet therapy · Initiation of anticoagulation therapy with Eliquis 5 mg orally twice daily (\"off-label\" use) for recurrent arterial thrombosis (coronary and cerebral).  Hypercoagulable state suspected · Outpatient referral to hematology for hypercoagulable state work-up · Outpatient referral to cardiac rehab · Counseling regarding essential need to maintain compliance with medications · Echocardiogram in a.m. to evaluate LV systolic function post-ACS Impression: Severe single-vessel coronary artery disease with multiple sites of plaque rupture and coronary thrombosis within the RCA No evidence of in-stent restenosis Successful catheter-based revascularization of the proximal mid and distal RCA with placement of multiple drug-eluting stents Stent deployment optimization with OCT Suspected hypercoagulable state leading to multiple episodes of arterial thrombosis.  No venous thrombosis history Indication: Acute inferior ST elevation myocardial infarction --------------------------------------------------------------------------- ------------------------------------------- Clinical History: This is a 42-year-old male patient with known coronary artery disease and suspected hypercoagulable state with prior cerebral and coronary thrombosis who presents to the emergency room with acute onset of severe anterior chest pressure and heaviness.  Initial twelve-lead electrocardiogram showed pathologic Q waves in the inferior leads with " subtle J-point elevation but no highly suspicious findings for ST elevation myocardial infarction.  The patient had escalation of chest discomfort and repeat twelve-lead electrocardiogram showed evidence of inferior ST elevation myocardial infarction. Procedures performed: 1. Bilateral selective coronary angiography 2. Left heart catheterization 3. Balloon angioplasty x2-RCA 4. Drug-eluting stent placement x3-RCA 5. OCT x2-RCA  Access:   5\6 Macedonian Slender arterial hemostasis sheath placed via right radial artery; arterial sheath removed in the cardiac catheterization laboratory with application of a transradial band for patent hemostasis. Procedure narrative: The procedure was explained in detail to the patient, including risks benefits and alternatives.  The patient expressed understanding and a desire to proceed. Delfino's testing demonstrated excellent collateral circulation to both hands.  The patient was brought to the cardiac catheterization laboratory where the right wrist was prepped and draped in usual sterile fashion.  One percent lidocaine was infiltrated into the skin overlying the right radial artery.  Access was obtained from the right radial artery utilizing the micropuncture technique and a 5\6 Macedonian Slender hemostasis sheath was placed without difficulty.  The standard antispasm cocktail as well as 4000 units of unfractionated heparin was administered.  Retrograde catheterization was performed using a 6 Macedonian JR4 diagnostic catheter to engage  the right coronary artery and a 6 Macedonian Tiger diagnostic catheter to engage the left main coronary artery.  Hand injected angiograms was performed.  Contrast ventriculogram was not performed.  Left ventricular and aortic pullback pressures was obtained with the 6 Macedonian JR4 diagnostic catheter.  For the details of the interventional procedure, refer to the dictation below.  Estimated Blood Loss: Negligible Total Contrast: 240 mL Fluoro Time: 10.2-minute  "Radiation Dose: 1459 mGy (air kerma) Angiographic Findings: · Coronary circulation is right dominant · Left main: The left main coronary artery trifurcates into the left anterior descending ramus intermedius and circumflex coronary arteries.  The left main coronary artery has no significant disease. · LAD: The left anterior descending gives rise to the diagonal branches as well as multiple septal perforators before terminating as an apical recurrent branch.  The left anterior descending has minor luminal irregularities. · LCX: The ramus intermedius branch has minor luminal irregularities.  The circumflex coronary artery is a nondominant vessel giving rise to the obtuse marginal branches.  The circumflex coronary artery and its branches have minor luminal irregularities. · RCA: The right coronary artery is a large caliber ectatic vessel.  The vessel demonstrates severe tortuosity with a proximal \"inverted trujillo's crook\" anatomy.  The right coronary artery is dominant for the posterior circulation.  The proximal RCA has been previously stented.  The stent is widely patent without flow-limiting stenosis or evidence of in-stent restenosis.  There was an ostial less than 50% stenosis.  Coronary thrombus was identified just distal to the original RCA stent in an area of severe tortuosity.  In addition, the distal RCA demonstrates a large thrombus burden.  Stenoses in these locations was greater than 90% severity. Hemodynamics: LV pressure: 100/ post a-10 mmHg Ao pressure: 100/70 Intervention Summary Lesion #1 Location:    Distal RCA Stenosis pre-PCI:   95% Stenosis post-PCI:  0% JENNIFER flow pre-PCI:  3 JENNIFER flow post-PCI:  3 ACC AHA class lesion: B OCT-distal RCA Proximal Edge Dissection: No Distal Edge Dissection: No Acceptable Stent Apposition (<3 mm length, >0.3 mm from wall): Yes Acceptable Stent Expansion Index (>0.8): Yes Goal MSA (>4.5 mm^2): Yes Lesion #2 Location:                               Mid RCA Pre-PCI " stenosis:                    95% Post-PCI stenosis:                   0% Pre-PCI JENNIFER flow:                   3 Post-PCI JENNIFER flow:                  3 ACC AHA lesion class:             C OCT-mid RCA Proximal Edge Dissection: No Distal Edge Dissection: No Acceptable Stent Apposition (<3 mm length, >0.3 mm from wall): Yes Acceptable Stent Expansion Index (>0.8): Yes Goal MSA (>4.5 mm^2): Yes Lesion #3 Location:                                Proximal RCA Pre-PCI stenosis:                     70% Post-PCI stenosis:                    0% Pre-PCI JENNIFER flow:                    3 Post-PCI JENNIFER flow:                  3 ACC AHA lesion class:             A OCT-proximal RCA Proximal Edge Dissection: No Distal Edge Dissection: No Acceptable Stent Apposition (<3 mm length, >0.3 mm from wall): Yes Acceptable Stent Expansion Index (>0.8): Yes Goal MSA (>4.5 mm^2): Yes Guide:   6 Albanian AL 0.75 Anticoagulation:  Heparin The patient was given additional 4000 units of unfractionated heparin followed by 1000 units of unfractionated heparin yielding an ACT of 310.  The patient was started on a double Integrilin bolus and drip protocol.  After reengage in the right coronary artery with the guiding catheter, the multiple areas of subtotal occlusion throughout the right coronary artery was crossed with a Run-through guidewire.  Balloon angioplasty was performed in the mid RCA utilizing a 4.0 x 20 mm TREK balloon dilated to 8 cody for 60 seconds.  Balloon angioplasty was then performed in the distal right coronary artery utilizing a 2.5 x 20 mm TREK balloon dilated to 8 cody for 60 seconds.  Stenting was then performed in the distal RCA utilizing a 2.75 x 15 mm Xience drug-eluting stent.  The proximal 2/3 of the stent was further dilated utilizing a 3.5 x 12mm NC TREK balloon dilated to 16 cody for 20 seconds.  OCT was performed which showed no proximal or distal edge dissection.  The stent was completely apposed to the vessel wall and  completely expanded with differential sizing.  Next, stenting of the mid RCA was performed utilizing a 4.5 x 23 mm Xience drug-eluting stent dilated to 18 cody for 20 seconds.  This stent overlapped with the original proximal RCA stent.  A final 4.5 x 12 mm Xience drug-eluting stent was deployed proximal to the original stent with overlap.  OCT was performed which showed no evidence of proximal or distal edge dissection.  The stent was completely opposed to the vessel wall and completely expanded.  The angiographic result was excellent throughout. Complications: None apparent     XR Chest 1 View    Result Date: 9/17/2022  PORTABLE CHEST  9/17/2022 12:30 PM  HISTORY: Chest pain  COMPARISON:   None  FINDINGS: The cardiac silhouette is normal in size. The mediastinal and hilar contours are unremarkable.  The lungs are clear. There is no pneumothorax. The visualized osseous structures demonstrate no acute abnormalities.      No acute cardiopulmonary process.      This report was signed and finalized on 9/17/2022 12:56 PM by Jenifer Peng MD.    Assessment:     1. Nausea and vomiting likely secondary to gastritis.  2. Acute inferior wall STEMI status post stents to RCA on 9/17/2022.  3. Essential hypertension.  4. Polycythemia- will need outpatient follow-up with hematology.    Recommendations/Plan:     Nausea and vomitings/gastritis.  - The exact etiology of his nausea and vomiting is uncertain but could be related to gastric irritation/gastritis from antiplatelet agents.  - His abdomen is benign and his lipase is within normal limits.  - We will place him on IV Protonix and Maalox as needed.  - Continue Zofran and Compazine as needed for symptomatic relief.  - We will switch him to clear liquid diet.    Elevated white count.  - At this time I do not suspect any evidence of infection.  - He does have history of polycythemia and has seen hematology in the past.  He will benefit from outpatient follow-up with  hematology.  - We will repeat CBC in the morning.  I will hold off on antibiotics therapy at this time.  - I have advised him to use the incentive spirometry.    STEMI status post stents/hypertension.  - Continue aspirin, Brilinta and Eliquis as per Dr. Solomon's orders.  - Continue carvedilol, lisinopril and atorvastatin.    Discussed with nursing staff.    Thank you for this consult. Please do not hesitate to call me for any questions.    Bryant Davis MD  09/18/22  13:45 EDT    Dictated utilizing Dragon dictation.

## 2022-09-18 NOTE — PAYOR COMM NOTE
"TO: [employer] [place] [employer]  FROM: MURIEL MOORERN [phone], [phone]  MEMBER ID# [id number]  INPT NOTIFICATION AND CLINICALS    Ami Dixon (42 y.o. Male)             Date of Birth   1980    Social Security Number       Address   [address] [address]    Home Phone   [phone]    MRN   [id number]       Jewish   None    Marital Status   Single                            Admission Date   9/17/22    Admission Type   Emergency    Admitting Provider   René Solomon MD    Attending Provider   René Solomon MD    Department, Room/Bed   [facility] INTENSIVE CARE, I08/1       Discharge Date       Discharge Disposition       Discharge Destination                               Attending Provider: René Solomon MD    Allergies: No Known Allergies    Isolation: None   Infection: None   Code Status: CPR   Advance Care Planning Activity    Ht: 175.3 cm (69\")   Wt: 82.7 kg (182 lb 5.1 oz)    Admission Cmt: None   Principal Problem: ST elevation myocardial infarction involving right coronary artery (HCC) [I21.11]                 Active Insurance as of 9/17/2022     Primary Coverage     Payor Plan Insurance Group Employer/Plan Group    [facility] MEDICAID      Payor Plan Address Payor Plan Phone Number Payor Plan Fax Number Effective Dates    [address] [address] [phone]  12/1/2020 - None Entered    [facility] [id number]       Subscriber Name Subscriber Birth Date Member ID       AMI DIXON 1980 [id number]                 Emergency Contacts      (Rel.) Home Phone Work Phone Mobile Phone    Robina Cifuentes (Mother) [phone] -- --               History & Physical      René Solomon MD at 09/17/22 1320                Patient Care Team:  Gabriel Mandel PA as PCP - General (Family Medicine)    Chief complaint : Chest pain    Subjective     Patient is a 42 y.o. male presents with known coronary artery disease " (stenting of the proximal RCA in July 2021-drug-eluting stent) who presents to the emergency room with acute onset of severe anterior chest pressure/heaviness.  The discomfort did not radiate.  It was associated with shortness of breath.  The discomfort had 8/10 intensity on arrival.  The discomfort decreased to 3/10 intensity with sublingual nitroglycerin.  Initial twelve-lead electrocardiogram showed inferior Q waves with subtle ST segment elevation but no reciprocal ST segment depression.  The patient's chest discomfort intensified and repeat twelve-lead electrocardiogram showed prominent ST segment elevation in the inferior leads with reciprocal ST segment depression and the high lateral leads.  The patient indicates the quality of his discomfort is identical to that which he experienced with his acute coronary syndrome last year.      Review of Systems   Pertinent items are noted in HPI, all other systems reviewed and negative    History  Past Medical History:   Diagnosis Date   • Anxiety    • Depression    • Glaucoma    • Hypertension    • Screening for neurological condition    • Seizures (HCC)    • Self-injurious behavior    • Stroke (HCC)      Past Surgical History:   Procedure Laterality Date   • CARDIAC CATHETERIZATION N/A 7/14/2021    Procedure: CORONARY ANGIOGRAPHY;  Surgeon: Farhad Grady MD;  Location: [facility] CATH INVASIVE LOCATION;  Service: Cardiology;  Laterality: N/A;   • CARDIAC CATHETERIZATION N/A 7/14/2021    Procedure: Percutaneous Coronary Intervention;  Surgeon: Farhad Grady MD;  Location: [facility] CATH INVASIVE LOCATION;  Service: Cardiology;  Laterality: N/A;   • CARDIAC CATHETERIZATION N/A 7/14/2021    Procedure: Optical Coherent Tomography;  Surgeon: Farhad Grady MD;  Location: [facility] CATH INVASIVE LOCATION;  Service: Cardiology;  Laterality: N/A;   • FRACTURE SURGERY Right     ankle     Family History   Problem Relation Age of Onset   • Dementia Maternal Grandmother    •  Anxiety disorder Maternal Grandmother    • Alcohol abuse Mother    • Anxiety disorder Mother    • Alcohol abuse Father    • Drug abuse Father    • Seizures Father    • OCD Maternal Grandfather    • Drug abuse Paternal Grandfather    • Drug abuse Paternal Grandmother    • Schizophrenia Cousin    • ADD / ADHD Neg Hx    • Bipolar disorder Neg Hx    • Paranoid behavior Neg Hx    • Self-Injurious Behavior  Neg Hx    • Suicide Attempts Neg Hx      Social History     Tobacco Use   • Smoking status: Former Smoker     Types: Cigarettes     Quit date:      Years since quittin.7   • Smokeless tobacco: Former User     Types: Snuff     Quit date:    Vaping Use   • Vaping Use: Never used   Substance Use Topics   • Alcohol use: Yes     Comment: OCC/ once a month   • Drug use: Yes     Types: Marijuana     Comment: RARELY     E-cigarette/Vaping   • E-cigarette/Vaping Use Never User      E-cigarette/Vaping Substances   • Nicotine No    • THC No    • CBD No    • Flavoring No      (Not in a hospital admission)    Allergies:  Patient has no known allergies.    Objective     Vital Signs  Temp:  [98.1 °F (36.7 °C)] 98.1 °F (36.7 °C)  Heart Rate:  [0-66] 0  Resp:  [18] 18  BP: (102)/(61) 102/61    Physical Exam:      General Appearance:    Alert, cooperative, in no acute distress   Head:    Normocephalic, without obvious abnormality, atraumatic   Eyes:            Lids and lashes normal, conjunctivae and sclerae normal, no   icterus, no pallor, corneas clear, PERRLA   Ears:    Ears appear intact with no abnormalities noted   Throat:   No oral lesions, no thrush, oral mucosa moist   Neck:   No adenopathy, supple, trachea midline, no thyromegaly, no   carotid bruit, no JVD   Back:     No kyphosis present, no scoliosis present, no skin lesions,      erythema or scars, no tenderness to percussion or                   palpation,   range of motion normal   Lungs:     Clear to auscultation,respirations regular, even and                   unlabored    Heart:    Regular rhythm and normal rate, normal S1 and S2, no            murmur, no gallop, no rub, no click   Chest Wall:    No abnormalities observed   Abdomen:     Normal bowel sounds, no masses, no organomegaly, soft        non-tender, non-distended, no guarding, no rebound                tenderness   Rectal:     Deferred   Extremities:   Moves all extremities well, no edema, no cyanosis, no             redness   Pulses:   Pulses palpable and equal bilaterally   Skin:   No bleeding, bruising or rash   Lymph nodes:   No palpable adenopathy   Neurologic:   Cranial nerves 2 - 12 grossly intact, sensation intact, DTR       present and equal bilaterally     Results Review:    I reviewed the patient's new imaging results and agree with the interpretation.    Assessment & Plan     Acute inferior ST elevation myocardial infarction    I have recommended emergency coronary angiography with catheter-based standby.  Mr. Chapman has been engaged in a patient level discussion explaining the rationale for proceeding with invasive testing/procedures.  The procedure of coronary angiography with catheter-based coronary revascularization has been explained in detail, using layman's terminology, including risks benefits and alternatives.  He expresses understanding and a desire to proceed.  Further recommendations will be predicated on the results of his catheterization findings.    I discussed the patients findings and my recommendations with patient.     René Solomon MD  09/17/22  13:21 EDT        Electronically signed by René Solomon MD at 09/17/22 1325          Emergency Department Notes      Jus Mathews PA-C at 09/17/22 1232     Attestation signed by Cristo Tafoya DO at 09/17/22 1541        NON FACE TO FACE: This visit was performed by BOTH a physician and an APC. I performed all aspects of the MDM as documented.  Cristo Tafoya DO 9/17/2022 15:40 EDT                          Subjective   History of Present Illness  Chief Complaint: Chest pain  History of Present Illness: 50-year-old male comes in for evaluation of above complaint.  He states about 830 to 9:00 this morning he awoke and noted a right-sided chest pressure.  Spread across the left-sided chest and down to his left upper arm.  He states the pain in his upper arm on the left is squeezing.  He states he did have some diaphoresis but no nausea or short of breath.  Called EMS was administer 1 nitroglycerin sublingual and 1 aspirin and brought here.  He has a history of a STEMI and July 2001 with a stent placed.  He is on dual antiplatelet therapy with Plavix and aspirin as well as carvedilol and lisinopril.  Onset: 830 to 9 AM  Timing: Ongoing, slightly improved  Exacerbating / Alleviating factors: None  Associated symptoms: None      Nurses Notes reviewed and agree, including vitals, allergies, social history and prior medical history.        Review of Systems   Constitutional: Negative.    HENT: Negative.    Eyes: Negative.    Respiratory: Negative.    Cardiovascular: Positive for chest pain.   Gastrointestinal: Negative.    Genitourinary: Negative.    Musculoskeletal: Negative.    Skin: Negative.    Allergic/Immunologic: Negative.    Neurological: Negative.    Psychiatric/Behavioral: Negative.    All other systems reviewed and are negative.      Past Medical History:   Diagnosis Date   • Anxiety    • Depression    • Glaucoma    • Hypertension    • Screening for neurological condition    • Seizures (HCC)    • Self-injurious behavior    • Stroke (HCC)        No Known Allergies    Past Surgical History:   Procedure Laterality Date   • CARDIAC CATHETERIZATION N/A 7/14/2021    Procedure: CORONARY ANGIOGRAPHY;  Surgeon: Farhad Grady MD;  Location: [facility] CATH INVASIVE LOCATION;  Service: Cardiology;  Laterality: N/A;   • CARDIAC CATHETERIZATION N/A 7/14/2021    Procedure: Percutaneous Coronary Intervention;  Surgeon: Miguel A  Farhad Kearney MD;  Location:  [facility] CATH INVASIVE LOCATION;  Service: Cardiology;  Laterality: N/A;   • CARDIAC CATHETERIZATION N/A 2021    Procedure: Optical Coherent Tomography;  Surgeon: Farhad Grady MD;  Location: [facility] CATH INVASIVE LOCATION;  Service: Cardiology;  Laterality: N/A;   • FRACTURE SURGERY Right     ankle       Family History   Problem Relation Age of Onset   • Dementia Maternal Grandmother    • Anxiety disorder Maternal Grandmother    • Alcohol abuse Mother    • Anxiety disorder Mother    • Alcohol abuse Father    • Drug abuse Father    • Seizures Father    • OCD Maternal Grandfather    • Drug abuse Paternal Grandfather    • Drug abuse Paternal Grandmother    • Schizophrenia Cousin    • ADD / ADHD Neg Hx    • Bipolar disorder Neg Hx    • Paranoid behavior Neg Hx    • Self-Injurious Behavior  Neg Hx    • Suicide Attempts Neg Hx        Social History     Socioeconomic History   • Marital status: Single   Tobacco Use   • Smoking status: Former Smoker     Types: Cigarettes     Quit date:      Years since quittin.7   • Smokeless tobacco: Former User     Types: Snuff     Quit date:    Vaping Use   • Vaping Use: Never used   Substance and Sexual Activity   • Alcohol use: Yes     Comment: OCC/ once a month   • Drug use: Yes     Types: Marijuana     Comment: RARELY   • Sexual activity: Defer           Objective   Physical Exam  Vitals and nursing note reviewed.   Constitutional:       General: He is not in acute distress.     Appearance: He is well-developed and normal weight. He is not ill-appearing, toxic-appearing or diaphoretic.   HENT:      Head: Normocephalic and atraumatic.   Cardiovascular:      Rate and Rhythm: Normal rate and regular rhythm.      Heart sounds: Normal heart sounds.   Pulmonary:      Effort: Pulmonary effort is normal.      Breath sounds: Normal breath sounds.   Abdominal:      Palpations: Abdomen is soft.   Musculoskeletal:         General: Normal range of  motion.      Cervical back: Normal range of motion.   Skin:     General: Skin is warm and dry.   Neurological:      General: No focal deficit present.      Mental Status: He is alert.   Psychiatric:         Mood and Affect: Mood normal.         Behavior: Behavior normal.         Procedures          ED Course  ED Course as of 09/17/22 1315   Sat Sep 17, 2022   1231 EKG interpreted by me.  Sinus rhythm.  Rate of 60.  Nonspecific ST segment changes.  Minimal elevation in leads II, III and aVF.  Minimal depressions in leads aVL.  Appears similar to previous. No acute RADHA.   Discussed with cardiology on call. They agree.  [CG]   1314 Repeat EKG interpreted by me.  Sinus rhythm.  Worsening ST segment elevation in inferior leads with reciprocal changes.  Discussed with Dr. Solomon.  Cardiac catheterization lab activated at this time. [CG]      ED Course User Index  [CG] Cristo Tafoya DO                                           [facility]  Patient states chest pain is improved some but now has more of a shooting pain down the left upper extremity at 1315.  Repeat EKG shows new elevation in the inferior leads concerning for STEMI, Dr. Solomon contacted, he recommends activation of Cath Lab.  Recommends only morphine to be administered in addition to what is been on at this point.  Patient updated and is comfortable with the plan.  Final diagnoses:   ST elevation myocardial infarction (STEMI), unspecified artery (HCC)       ED Disposition  ED Disposition     ED Disposition   Send to Cath Lab    Condition   --    Comment   --             No follow-up provider specified.       Medication List      No changes were made to your prescriptions during this visit.          Jus Mathews PA-C  09/17/22 1315      Electronically signed by Cristo Tafoya DO at 09/17/22 1541         Current Facility-Administered Medications   Medication Dose Route Frequency Provider Last Rate Last Admin   • acetaminophen (TYLENOL) tablet 650  mg  650 mg Oral Q4H PRN René oSlomon MD   650 mg at 09/18/22 0116   • ALPRAZolam (XANAX) tablet 0.25 mg  0.25 mg Oral TID PRN René Solomon MD   0.25 mg at 09/18/22 0926   • apixaban (ELIQUIS) tablet 5 mg  5 mg Oral Q12H René Solomon MD   5 mg at 09/18/22 1207   • aspirin EC tablet 81 mg  81 mg Oral Daily René Solomon MD   81 mg at 09/18/22 0926   • atorvastatin (LIPITOR) tablet 80 mg  80 mg Oral Nightly René Solomon MD   80 mg at 09/17/22 2031   • carvedilol (COREG) tablet 12.5 mg  12.5 mg Oral BID With Meals René Solomon MD   12.5 mg at 09/18/22 0925   • citalopram (CeleXA) tablet 20 mg  20 mg Oral Daily René Solomon MD   20 mg at 09/18/22 0926   • diphenhydrAMINE (BENADRYL) injection 25 mg  25 mg Intravenous Q6H PRN René Solomon MD   25 mg at 09/18/22 0712   • Enoxaparin Sodium (LOVENOX) syringe 40 mg  40 mg Subcutaneous Daily René Solomon MD   40 mg at 09/18/22 1207   • HYDROcodone-acetaminophen (NORCO) 5-325 MG per tablet 1 tablet  1 tablet Oral Q4H PRN René Solomon MD   1 tablet at 09/17/22 2036   • [START ON 9/19/2022] lisinopril (PRINIVIL,ZESTRIL) tablet 10 mg  10 mg Oral Daily René Solomon MD       • Morphine sulfate (PF) injection 4 mg  4 mg Intravenous Q1H PRN René Solomon MD   4 mg at 09/17/22 2206    And   • naloxone (NARCAN) injection 0.4 mg  0.4 mg Intravenous Q5 Min PRN René Solomon MD       • ondansetron (ZOFRAN) tablet 4 mg  4 mg Oral Q6H PRN René Solomon MD        Or   • ondansetron (ZOFRAN) injection 4 mg  4 mg Intravenous Q6H PRN René Solomon MD   4 mg at 09/18/22 0925   • prochlorperazine (COMPAZINE) injection 5 mg  5 mg Intravenous Q6H PRN Bryant Davis MD   5 mg at 09/18/22 1238   • promethazine (PHENERGAN) 12.5 mg in sodium chloride 0.9 % 50 mL  12.5 mg Intravenous Q4H PRN René Solomon MD       • sodium chloride 0.9 % flush 10 mL  10 mL Intravenous PRN René Solomon MD   10 mL at 09/18/22 0924    • temazepam (RESTORIL) capsule 15 mg  15 mg Oral Nightly PRN René Solomon MD   15 mg at 09/18/22 0116   • thyroid (ARMOUR) tablet 30 mg  30 mg Oral Daily René Solomon MD   30 mg at 09/18/22 0925   • ticagrelor (BRILINTA) tablet 90 mg  90 mg Oral BID René Solomon MD   90 mg at 09/18/22 0926     Lab Results (last 48 hours)     Procedure Component Value Units Date/Time    Hemoglobin A1c [[id number]]  (Normal) Collected: 09/18/22 0455    Specimen: Blood Updated: 09/18/22 0522     Hemoglobin A1C 5.00 %     Narrative:      Hemoglobin A1C Ranges:    Increased Risk for Diabetes  5.7% to 6.4%  Diabetes                     >= 6.5%  Diabetic Goal                < 7.0%    Lipid Panel [[id number]]  (Abnormal) Collected: 09/18/22 0455    Specimen: Blood Updated: 09/18/22 0521     Total Cholesterol 235 mg/dL      Triglycerides 61 mg/dL      HDL Cholesterol 51 mg/dL      LDL Cholesterol  174 mg/dL      VLDL Cholesterol 10 mg/dL      LDL/HDL Ratio 3.37    Narrative:      Cholesterol Reference Ranges  (U.S. Department of Health and Human Services ATP III Classifications)    Desirable          <200 mg/dL  Borderline High    200-239 mg/dL  High Risk          >240 mg/dL      Triglyceride Reference Ranges  (U.S. Department of Health and Human Services ATP III Classifications)    Normal           <150 mg/dL  Borderline High  150-199 mg/dL  High             200-499 mg/dL  Very High        >500 mg/dL    HDL Reference Ranges  (U.S. Department of Health and Human Services ATP III Classifications)    Low     <40 mg/dl (major risk factor for CHD)  High    >60 mg/dl ('negative' risk factor for CHD)        LDL Reference Ranges  (U.S. Department of Health and Human Services ATP III Classifications)    Optimal          <100 mg/dL  Near Optimal     100-129 mg/dL  Borderline High  130-159 mg/dL  High             160-189 mg/dL  Very High        >189 mg/dL    Basic Metabolic Panel [[id number]]  (Abnormal) Collected: 09/18/22 0455     Specimen: Blood Updated: 09/18/22 0521     Glucose 129 mg/dL      BUN 10 mg/dL      Creatinine 1.13 mg/dL      Sodium 134 mmol/L      Potassium 4.1 mmol/L      Chloride 94 mmol/L      CO2 26.5 mmol/L      Calcium 9.7 mg/dL      BUN/Creatinine Ratio 8.8     Anion Gap 13.5 mmol/L      eGFR 84.3 mL/min/1.73      Comment: National Kidney Foundation and American Society of Nephrology (ASN) Task Force recommended calculation based on the Chronic Kidney Disease Epidemiology Collaboration (CKD-EPI) equation refit without adjustment for race.       Narrative:      GFR Normal >60  Chronic Kidney Disease <60  Kidney Failure <15      CBC (No Diff) [[id number]]  (Abnormal) Collected: 09/18/22 0455    Specimen: Blood Updated: 09/18/22 0504     WBC 30.87 10*3/mm3      RBC 6.18 10*6/mm3      Hemoglobin 19.4 g/dL      Hematocrit 55.7 %      MCV 90.1 fL      MCH 31.4 pg      MCHC 34.8 g/dL      RDW 14.0 %      RDW-SD 45.7 fl      MPV 9.0 fL      Platelets 433 10*3/mm3     Troponin [[id number]]  (Abnormal) Collected: 09/17/22 1555    Specimen: Blood Updated: 09/17/22 1642     Troponin T 1.220 ng/mL     Narrative:      Troponin T Reference Range:  <= 0.03 ng/mL-   Negative for AMI  >0.03 ng/mL-     Abnormal for myocardial necrosis.  Clinicians would have to utilize clinical acumen, EKG, Troponin and serial changes to determine if it is an Acute Myocardial Infarction or myocardial injury due to an underlying chronic condition.       Results may be falsely decreased if patient taking Biotin.      [place] Draw [[id number]] Collected: 09/17/22 1219    Specimen: Blood Updated: 09/17/22 1332    Narrative:      The following orders were created for panel order [place] Draw.  Procedure                               Abnormality         Status                     ---------                               -----------         ------                     Green Top (Gel)[[id number]]                                  Final result               Lavender  Top[[id number]]                                     Final result               Gold Top - SST[[id number]]                                   Final result               Light Blue Top[[id number]]                                   Final result                 Please view results for these tests on the individual orders.    Green Top (Gel) [[id number]] Collected: 09/17/22 1219    Specimen: Blood Updated: 09/17/22 1332     Extra Tube Hold for add-ons.     Comment: Auto resulted.       Lavender Top [[id number]] Collected: 09/17/22 1219    Specimen: Blood Updated: 09/17/22 1332     Extra Tube hold for add-on     Comment: Auto resulted       Light Blue Top [[id number]] Collected: 09/17/22 1219    Specimen: Blood Updated: 09/17/22 1332     Extra Tube Hold for add-ons.     Comment: Auto resulted       Gold Top - SST [[id number]] Collected: 09/17/22 1219    Specimen: Blood Updated: 09/17/22 1332     Extra Tube Hold for add-ons.     Comment: Auto resulted.       CBC & Differential [[id number]]  (Abnormal) Collected: 09/17/22 1219    Specimen: Blood Updated: 09/17/22 1247    Narrative:      The following orders were created for panel order CBC & Differential.  Procedure                               Abnormality         Status                     ---------                               -----------         ------                     CBC Auto Differential[[id number]]        Abnormal            Final result               Scan Slide[[id number]]                                       Final result                 Please view results for these tests on the individual orders.    Scan Slide [[id number]] Collected: 09/17/22 1219    Specimen: Blood Updated: 09/17/22 1247     RBC Morphology Normal     WBC Morphology Normal     Platelet Estimate Adequate    CBC Auto Differential [[id number]]  (Abnormal) Collected: 09/17/22 1219    Specimen: Blood Updated: 09/17/22 1247     WBC 23.33 10*3/mm3      RBC 5.42 10*6/mm3      Hemoglobin 17.2 g/dL      Hematocrit  49.2 %      MCV 90.8 fL      MCH 31.7 pg      MCHC 35.0 g/dL      RDW 13.6 %      RDW-SD 45.5 fl      MPV 9.0 fL      Platelets 415 10*3/mm3      Neutrophil % 70.9 %      Lymphocyte % 18.0 %      Monocyte % 8.3 %      Eosinophil % 1.8 %      Basophil % 0.6 %      Immature Grans % 0.4 %      Neutrophils, Absolute 16.52 10*3/mm3      Lymphocytes, Absolute 4.20 10*3/mm3      Monocytes, Absolute 1.94 10*3/mm3      Eosinophils, Absolute 0.42 10*3/mm3      Basophils, Absolute 0.15 10*3/mm3      Immature Grans, Absolute 0.10 10*3/mm3      nRBC 0.0 /100 WBC     Troponin [[id number]]  (Normal) Collected: 09/17/22 1219    Specimen: Blood Updated: 09/17/22 1245     Troponin T <0.010 ng/mL     Narrative:      Troponin T Reference Range:  <= 0.03 ng/mL-   Negative for AMI  >0.03 ng/mL-     Abnormal for myocardial necrosis.  Clinicians would have to utilize clinical acumen, EKG, Troponin and serial changes to determine if it is an Acute Myocardial Infarction or myocardial injury due to an underlying chronic condition.       Results may be falsely decreased if patient taking Biotin.      Comprehensive Metabolic Panel [[id number]]  (Abnormal) Collected: 09/17/22 1219    Specimen: Blood Updated: 09/17/22 1244     Glucose 141 mg/dL      BUN 9 mg/dL      Creatinine 1.04 mg/dL      Sodium 134 mmol/L      Potassium 4.6 mmol/L      Chloride 97 mmol/L      CO2 26.2 mmol/L      Calcium 8.9 mg/dL      Total Protein 7.1 g/dL      Albumin 3.50 g/dL      ALT (SGPT) 12 U/L      AST (SGOT) 13 U/L      Alkaline Phosphatase 74 U/L      Total Bilirubin 0.5 mg/dL      Globulin 3.6 gm/dL      A/G Ratio 1.0 g/dL      BUN/Creatinine Ratio 8.7     Anion Gap 10.8 mmol/L      eGFR 93.1 mL/min/1.73      Comment: National Kidney Foundation and American Society of Nephrology (ASN) Task Force recommended calculation based on the Chronic Kidney Disease Epidemiology Collaboration (CKD-EPI) equation refit without adjustment for race.       Narrative:      GFR  Normal >60  Chronic Kidney Disease <60  Kidney Failure <15            Imaging Results (Last 48 Hours)     Procedure Component Value Units Date/Time    XR Chest 1 View [[id number]] Collected: 09/17/22 1255     Updated: 09/17/22 1258    Narrative:      PORTABLE CHEST  9/17/2022 12:30 PM     HISTORY: Chest pain     COMPARISON:   None     FINDINGS: The cardiac silhouette is normal in size. The mediastinal and  hilar contours are unremarkable.  The lungs are clear. There is no  pneumothorax. The visualized osseous structures demonstrate no acute  abnormalities.       Impression:      No acute cardiopulmonary process.                 This report was signed and finalized on 9/17/2022 12:56 PM by Jenifer Peng MD.        ECG/EMG Results (last 48 hours)     Procedure Component Value Units Date/Time    ECG 12 Lead [[id number]] Resulted: 09/17/22 1218     Updated: 09/17/22 1219    ECG 12 Lead [[id number]] Resulted: 09/17/22 1318     Updated: 09/17/22 1318           Physician Progress Notes (last 48 hours)      René Solomon MD at 09/18/22 1009          [facility]-Cardiology Progress note     LOS: 1 day   Patient Care Team:  Gabriel Mandel PA as PCP - General (Family Medicine)    Chief Complaint: Chest pain    Subjective:    Interval History: Overnight, the patient has had no recurrent chest discomfort.  He has been experiencing severe nausea and vomiting refractory to Zofran.  No cardiac arrhythmia has been noted.  He has manifest no signs or symptoms of clinical congestive heart failure.  ST segments on twelve-lead electrocardiogram have returned to baseline.  He is remained afebrile; however, white blood cell count has increased to just over 30,000.  Hematocrit is greater than 55%.  He has a pre-existing diagnosis of polycythemia vera.    Patient Complaints: Nausea and vomiting  Patient Denies:   Chest pain, shortness of breath, orthopnea, peripheral edema, palpitations, cough, sputum production, hemoptysis, abdominal  "pain, diarrhea, fevers chills or night sweats  History taken from: patient family    Review of Systems:   All systems were reviewed and negative except for:  Gastrointestinal: positive for  nausea and vomiting      Objective:    Vital Sign Min/Max for last 24 hours  Temp  Min: 98 °F (36.7 °C)  Max: 98.5 °F (36.9 °C)   BP  Min: 102/61  Max: 225/117   Pulse  Min: 0  Max: 94   Resp  Min: 12  Max: 20   SpO2  Min: 92 %  Max: 100 %   Flow (L/min)  Min: 2  Max: 2   Weight  Min: 82.7 kg (182 lb 5.1 oz)  Max: 83.9 kg (185 lb)     Flowsheet Rows    Flowsheet Row First Filed Value   Admission Height 175.3 cm (69\") Documented at 09/17/2022 1214   Admission Weight 83.9 kg (185 lb) Documented at 09/17/2022 1214          Physical Exam:     General Appearance:    Alert, cooperative, in no acute distress   Head:    Normocephalic, without obvious abnormality, atraumatic   Eyes:            Lids and lashes normal, conjunctivae and sclerae normal, no   icterus, no pallor, corneas clear, PERRLA   Ears:    Ears appear intact with no abnormalities noted   Throat:   No oral lesions, no thrush, oral mucosa moist   Neck:   No adenopathy, supple, trachea midline, no thyromegaly, no   carotid bruit, no JVD   Back:     No kyphosis present, no scoliosis present, no skin lesions,      erythema or scars, no tenderness to percussion or                   palpation,   range of motion normal   Lungs:     Clear to auscultation,respirations regular, even and                  unlabored    Heart:    Regular rhythm and normal rate, normal S1 and S2, no            murmur, no gallop, no rub, no click   Chest Wall:    No abnormalities observed   Abdomen:     Normal bowel sounds, no masses, no organomegaly, soft        non-tender, non-distended, no guarding, no rebound                tenderness   Rectal:     Deferred   Extremities:   Moves all extremities well, no edema, no cyanosis, no             redness   Pulses:   Pulses palpable and equal bilaterally " "  Skin:   No bleeding, bruising or rash   Lymph nodes:   No palpable adenopathy   Neurologic:   Cranial nerves 2 - 12 grossly intact, sensation intact, DTR       present and equal bilaterally        Results Review:     I reviewed the patient's new clinical results.  Results from last 7 days   Lab Units 09/18/22  0455   HEMOGLOBIN A1C % 5.00     Results from last 7 days   Lab Units 09/18/22  0455   SODIUM mmol/L 134*   POTASSIUM mmol/L 4.1   CHLORIDE mmol/L 94*   CO2 mmol/L 26.5   BUN mg/dL 10   CREATININE mg/dL 1.13   GLUCOSE mg/dL 129*   CALCIUM mg/dL 9.7     Results from last 7 days   Lab Units 09/18/22  0455 09/17/22  1219   WBC 10*3/mm3 30.87* 23.33*   HEMOGLOBIN g/dL 19.4* 17.2   HEMATOCRIT % 55.7* 49.2   PLATELETS 10*3/mm3 433 415           Echo EF Estimated  Lab Results   Component Value Date    ECHOEFEST 55 07/15/2021       Physical Exam    Medication Review: yes    Assessment/Plan:      ST elevation myocardial infarction involving right coronary artery (HCC)    ST elevation myocardial infarction (STEMI), unspecified artery (HCC)      Successful PCI of multiple areas of coronary thrombosis within the right coronary artery.  Concern regarding possible \"hypercoagulable state\".  For this reason the patient will be discharged to home on enteric-coated baby aspirin 81 mg once per day, Brilinta 90 mg orally twice daily and at least 2 months of Eliquis 5 mg orally twice daily.    Development of nausea and vomiting overnight which was not part of his ACS presentation.  Hospitalist service has been consulted to evaluate.    Severe leukocytosis in the context of a previous diagnosis of polycythemia vera.  The magnitude of his WBC elevation is inconsistent with recent myocardial infarction and suggests other pathology.  Hospitalist service has been consulted to evaluate.    The patient may require inpatient hematology evaluation prior to discharge.    The patient's LDL cholesterol is 176 mg/dL.  The patient indicates " that he has not taken his statin based cholesterol lowering medication in 1 month.  I suspect the patient will require multiple cholesterol-lowering medications.  High intensity statin based cholesterol-lowering therapy has been reinitiated.  The patient will likely require addition of Praluent and/or Nexlizet as an outpatient.    Outpatient follow-up with Dr. Grady following hospital discharge-established cardiologist.    René Solomon MD  09/18/22  10:09 EDT          Electronically signed by René Solomon MD at 09/18/22 1016       Consult Notes (last 48 hours)  Notes from 09/16/22 1300 through 09/18/22 1300   No notes of this type exist for this encounter.

## 2022-09-18 NOTE — PLAN OF CARE
Problem: Adult Inpatient Plan of Care  Goal: Plan of Care Review  Outcome: Ongoing, Progressing  Goal: Patient-Specific Goal (Individualized)  Outcome: Ongoing, Progressing  Goal: Absence of Hospital-Acquired Illness or Injury  Outcome: Ongoing, Progressing  Intervention: Identify and Manage Fall Risk  Recent Flowsheet Documentation  Taken 9/18/2022 1800 by Viv Elliott, RN  Safety Promotion/Fall Prevention:   activity supervised   assistive device/personal items within reach   clutter free environment maintained   fall prevention program maintained   lighting adjusted   nonskid shoes/slippers when out of bed   room organization consistent   safety round/check completed   toileting scheduled  Taken 9/18/2022 1600 by Viv Elliott, RN  Safety Promotion/Fall Prevention:   activity supervised   assistive device/personal items within reach   clutter free environment maintained   fall prevention program maintained   lighting adjusted   nonskid shoes/slippers when out of bed   room organization consistent   safety round/check completed  Taken 9/18/2022 1400 by Viv Elliott, RN  Safety Promotion/Fall Prevention:   assistive device/personal items within reach   activity supervised   clutter free environment maintained   fall prevention program maintained   lighting adjusted   nonskid shoes/slippers when out of bed   room organization consistent   safety round/check completed   toileting scheduled  Taken 9/18/2022 1200 by Viv Elliott, RN  Safety Promotion/Fall Prevention:   activity supervised   assistive device/personal items within reach   clutter free environment maintained   fall prevention program maintained   lighting adjusted   nonskid shoes/slippers when out of bed   room organization consistent   safety round/check completed  Taken 9/18/2022 1000 by Viv Elliott, RN  Safety Promotion/Fall Prevention:   activity supervised   assistive device/personal items within reach   clutter free environment  maintained   fall prevention program maintained   lighting adjusted   nonskid shoes/slippers when out of bed   room organization consistent   safety round/check completed   toileting scheduled  Taken 9/18/2022 0800 by Viv Elliott RN  Safety Promotion/Fall Prevention:   activity supervised   assistive device/personal items within reach   clutter free environment maintained   fall prevention program maintained   lighting adjusted   nonskid shoes/slippers when out of bed   room organization consistent   safety round/check completed   toileting scheduled  Intervention: Prevent Skin Injury  Recent Flowsheet Documentation  Taken 9/18/2022 1800 by Viv Elliott RN  Body Position: position changed independently  Taken 9/18/2022 1600 by Viv Elliott RN  Body Position: position changed independently  Taken 9/18/2022 1400 by Viv Elliott RN  Body Position: position changed independently  Taken 9/18/2022 1200 by Viv Elliott RN  Body Position:   turned   position changed independently  Taken 9/18/2022 1000 by Viv Elliott RN  Body Position: position changed independently  Taken 9/18/2022 0800 by Viv Elliott RN  Body Position: position changed independently  Intervention: Prevent and Manage VTE (Venous Thromboembolism) Risk  Recent Flowsheet Documentation  Taken 9/18/2022 1800 by Viv Elliott, RN  Activity Management: bedrest  Taken 9/18/2022 1600 by Viv Elliott RN  Activity Management: bedrest  Taken 9/18/2022 1400 by Viv Elliott RN  Activity Management: activity adjusted per tolerance  Taken 9/18/2022 1200 by Viv Elliott, RN  Activity Management: bedrest  Taken 9/18/2022 1000 by Viv Elliott RN  Activity Management: bedrest  Taken 9/18/2022 0800 by Viv Elliott RN  Activity Management: bedrest  Intervention: Prevent Infection  Recent Flowsheet Documentation  Taken 9/18/2022 1200 by Viv Elliott RN  Infection Prevention:   environmental surveillance performed   equipment  surfaces disinfected   hand hygiene promoted   personal protective equipment utilized   rest/sleep promoted  Taken 9/18/2022 1100 by Viv Elliott, RN  Infection Prevention:   environmental surveillance performed   equipment surfaces disinfected   personal protective equipment utilized   rest/sleep promoted  Taken 9/18/2022 1000 by Viv Elliott, RN  Infection Prevention:   environmental surveillance performed   equipment surfaces disinfected   hand hygiene promoted   personal protective equipment utilized   single patient room provided   rest/sleep promoted  Taken 9/18/2022 0800 by Viv Elliott, RN  Infection Prevention:   environmental surveillance performed   equipment surfaces disinfected   hand hygiene promoted   personal protective equipment utilized   rest/sleep promoted   single patient room provided  Goal: Optimal Comfort and Wellbeing  Outcome: Ongoing, Progressing  Intervention: Provide Person-Centered Care  Recent Flowsheet Documentation  Taken 9/18/2022 1600 by Viv Elliott, RN  Trust Relationship/Rapport:   care explained   choices provided   emotional support provided   empathic listening provided   questions answered   questions encouraged   reassurance provided   thoughts/feelings acknowledged  Goal: Readiness for Transition of Care  Outcome: Ongoing, Progressing   Goal Outcome Evaluation:

## 2022-09-19 ENCOUNTER — READMISSION MANAGEMENT (OUTPATIENT)
Dept: CALL CENTER | Facility: HOSPITAL | Age: 42
End: 2022-09-19

## 2022-09-19 VITALS
WEIGHT: 186.29 LBS | HEART RATE: 73 BPM | OXYGEN SATURATION: 97 % | RESPIRATION RATE: 14 BRPM | TEMPERATURE: 97.7 F | BODY MASS INDEX: 27.59 KG/M2 | SYSTOLIC BLOOD PRESSURE: 104 MMHG | HEIGHT: 69 IN | DIASTOLIC BLOOD PRESSURE: 77 MMHG

## 2022-09-19 LAB
ALBUMIN SERPL-MCNC: 3.1 G/DL (ref 3.5–5.2)
ALBUMIN/GLOB SERPL: 0.8 G/DL
ALP SERPL-CCNC: 73 U/L (ref 39–117)
ALT SERPL W P-5'-P-CCNC: 23 U/L (ref 1–41)
ANION GAP SERPL CALCULATED.3IONS-SCNC: 7.5 MMOL/L (ref 5–15)
AST SERPL-CCNC: 50 U/L (ref 1–40)
BH CV ECHO MEAS - AO MAX PG: 5 MMHG
BH CV ECHO MEAS - AO MEAN PG: 3 MMHG
BH CV ECHO MEAS - AO ROOT DIAM: 3.5 CM
BH CV ECHO MEAS - AO V2 MAX: 112 CM/SEC
BH CV ECHO MEAS - AO V2 VTI: 19.9 CM
BH CV ECHO MEAS - AVA(I,D): 2.25 CM2
BH CV ECHO MEAS - EDV(CUBED): 84 ML
BH CV ECHO MEAS - EDV(MOD-SP2): 74.2 ML
BH CV ECHO MEAS - EDV(MOD-SP4): 88.6 ML
BH CV ECHO MEAS - EF(MOD-BP): 64.2 %
BH CV ECHO MEAS - EF(MOD-SP2): 66 %
BH CV ECHO MEAS - EF(MOD-SP4): 59.8 %
BH CV ECHO MEAS - ESV(CUBED): 24.9 ML
BH CV ECHO MEAS - ESV(MOD-SP2): 25.2 ML
BH CV ECHO MEAS - ESV(MOD-SP4): 35.6 ML
BH CV ECHO MEAS - FS: 33.3 %
BH CV ECHO MEAS - IVS/LVPW: 1.02 CM
BH CV ECHO MEAS - IVSD: 1.27 CM
BH CV ECHO MEAS - LA DIMENSION: 3.2 CM
BH CV ECHO MEAS - LAT PEAK E' VEL: 8.5 CM/SEC
BH CV ECHO MEAS - LV DIASTOLIC VOL/BSA (35-75): 45.7 CM2
BH CV ECHO MEAS - LV MASS(C)D: 202.8 GRAMS
BH CV ECHO MEAS - LV MAX PG: 4.1 MMHG
BH CV ECHO MEAS - LV MEAN PG: 2 MMHG
BH CV ECHO MEAS - LV SYSTOLIC VOL/BSA (12-30): 18.4 CM2
BH CV ECHO MEAS - LV V1 MAX: 101 CM/SEC
BH CV ECHO MEAS - LV V1 VTI: 16.3 CM
BH CV ECHO MEAS - LVIDD: 4.4 CM
BH CV ECHO MEAS - LVIDS: 2.9 CM
BH CV ECHO MEAS - LVOT AREA: 2.7 CM2
BH CV ECHO MEAS - LVOT DIAM: 1.87 CM
BH CV ECHO MEAS - LVPWD: 1.24 CM
BH CV ECHO MEAS - MED PEAK E' VEL: 7.2 CM/SEC
BH CV ECHO MEAS - MV A MAX VEL: 50 CM/SEC
BH CV ECHO MEAS - MV DEC TIME: 0.13 MSEC
BH CV ECHO MEAS - MV E MAX VEL: 51.4 CM/SEC
BH CV ECHO MEAS - MV E/A: 1.03
BH CV ECHO MEAS - MV MAX PG: 1.55 MMHG
BH CV ECHO MEAS - MV MEAN PG: 1 MMHG
BH CV ECHO MEAS - MV V2 VTI: 17 CM
BH CV ECHO MEAS - MVA(VTI): 2.6 CM2
BH CV ECHO MEAS - PA ACC TIME: 0.1 SEC
BH CV ECHO MEAS - PA PR(ACCEL): 36.3 MMHG
BH CV ECHO MEAS - PA V2 MAX: 88.8 CM/SEC
BH CV ECHO MEAS - RAP SYSTOLE: 3 MMHG
BH CV ECHO MEAS - SI(MOD-SP2): 25.3 ML/M2
BH CV ECHO MEAS - SI(MOD-SP4): 27.4 ML/M2
BH CV ECHO MEAS - SV(LVOT): 44.8 ML
BH CV ECHO MEAS - SV(MOD-SP2): 49 ML
BH CV ECHO MEAS - SV(MOD-SP4): 53 ML
BH CV ECHO MEAS - TAPSE (>1.6): 2.7 CM
BH CV ECHO MEASUREMENTS AVERAGE E/E' RATIO: 6.55
BH CV XLRA - RV BASE: 3.6 CM
BH CV XLRA - TDI S': 15.5 CM/SEC
BILIRUB SERPL-MCNC: 0.8 MG/DL (ref 0–1.2)
BUN SERPL-MCNC: 12 MG/DL (ref 6–20)
BUN/CREAT SERPL: 11.4 (ref 7–25)
CALCIUM SPEC-SCNC: 9.1 MG/DL (ref 8.6–10.5)
CHLORIDE SERPL-SCNC: 97 MMOL/L (ref 98–107)
CO2 SERPL-SCNC: 28.5 MMOL/L (ref 22–29)
CREAT SERPL-MCNC: 1.05 MG/DL (ref 0.76–1.27)
DEPRECATED RDW RBC AUTO: 46.5 FL (ref 37–54)
EGFRCR SERPLBLD CKD-EPI 2021: 92 ML/MIN/1.73
ERYTHROCYTE [DISTWIDTH] IN BLOOD BY AUTOMATED COUNT: 14.1 % (ref 12.3–15.4)
GLOBULIN UR ELPH-MCNC: 4 GM/DL
GLUCOSE SERPL-MCNC: 91 MG/DL (ref 65–99)
HCT VFR BLD AUTO: 52.4 % (ref 37.5–51)
HGB BLD-MCNC: 18.2 G/DL (ref 13–17.7)
LEFT ATRIUM VOLUME INDEX: 15.4 ML/M2
MAXIMAL PREDICTED HEART RATE: 180 BPM
MCH RBC QN AUTO: 31.4 PG (ref 26.6–33)
MCHC RBC AUTO-ENTMCNC: 34.7 G/DL (ref 31.5–35.7)
MCV RBC AUTO: 90.3 FL (ref 79–97)
PLATELET # BLD AUTO: 403 10*3/MM3 (ref 140–450)
PMV BLD AUTO: 9.5 FL (ref 6–12)
POTASSIUM SERPL-SCNC: 3.8 MMOL/L (ref 3.5–5.2)
PROT SERPL-MCNC: 7.1 G/DL (ref 6–8.5)
RBC # BLD AUTO: 5.8 10*6/MM3 (ref 4.14–5.8)
SODIUM SERPL-SCNC: 133 MMOL/L (ref 136–145)
STRESS TARGET HR: 153 BPM
WBC NRBC COR # BLD: 23.22 10*3/MM3 (ref 3.4–10.8)

## 2022-09-19 PROCEDURE — 99232 SBSQ HOSP IP/OBS MODERATE 35: CPT | Performed by: INTERNAL MEDICINE

## 2022-09-19 PROCEDURE — 25010000002 ENOXAPARIN PER 10 MG: Performed by: INTERNAL MEDICINE

## 2022-09-19 PROCEDURE — 85027 COMPLETE CBC AUTOMATED: CPT | Performed by: INTERNAL MEDICINE

## 2022-09-19 PROCEDURE — 80053 COMPREHEN METABOLIC PANEL: CPT | Performed by: INTERNAL MEDICINE

## 2022-09-19 PROCEDURE — 99239 HOSP IP/OBS DSCHRG MGMT >30: CPT | Performed by: INTERNAL MEDICINE

## 2022-09-19 RX ORDER — ATORVASTATIN CALCIUM 80 MG/1
80 TABLET, FILM COATED ORAL NIGHTLY
Qty: 90 TABLET | Refills: 3 | Status: SHIPPED | OUTPATIENT
Start: 2022-09-19 | End: 2022-12-28

## 2022-09-19 RX ORDER — LEVOTHYROXINE AND LIOTHYRONINE 19; 4.5 UG/1; UG/1
30 TABLET ORAL DAILY
Start: 2022-09-20

## 2022-09-19 RX ORDER — LISINOPRIL 10 MG/1
10 TABLET ORAL DAILY
Qty: 90 TABLET | Refills: 3 | Status: SHIPPED | OUTPATIENT
Start: 2022-09-20

## 2022-09-19 RX ORDER — CARVEDILOL 12.5 MG/1
12.5 TABLET ORAL 2 TIMES DAILY WITH MEALS
Qty: 180 TABLET | Refills: 3 | Status: SHIPPED | OUTPATIENT
Start: 2022-09-19

## 2022-09-19 RX ORDER — ASPIRIN 81 MG/1
81 TABLET ORAL DAILY
Qty: 180 TABLET | Refills: 3 | Status: SHIPPED | OUTPATIENT
Start: 2022-09-20

## 2022-09-19 RX ADMIN — CARVEDILOL 12.5 MG: 6.25 TABLET, FILM COATED ORAL at 08:09

## 2022-09-19 RX ADMIN — ACETAMINOPHEN 650 MG: 325 TABLET, FILM COATED ORAL at 08:17

## 2022-09-19 RX ADMIN — LISINOPRIL 10 MG: 10 TABLET ORAL at 08:09

## 2022-09-19 RX ADMIN — ASPIRIN 81 MG: 81 TABLET, COATED ORAL at 08:09

## 2022-09-19 RX ADMIN — THYROID, PORCINE 30 MG: 15 TABLET ORAL at 08:09

## 2022-09-19 RX ADMIN — PANTOPRAZOLE SODIUM 40 MG: 40 INJECTION, POWDER, FOR SOLUTION INTRAVENOUS at 07:36

## 2022-09-19 RX ADMIN — ENOXAPARIN SODIUM 40 MG: 40 INJECTION SUBCUTANEOUS at 08:08

## 2022-09-19 RX ADMIN — CITALOPRAM HYDROBROMIDE 20 MG: 20 TABLET ORAL at 08:09

## 2022-09-19 RX ADMIN — APIXABAN 5 MG: 5 TABLET, FILM COATED ORAL at 08:09

## 2022-09-19 RX ADMIN — TICAGRELOR 90 MG: 90 TABLET ORAL at 08:09

## 2022-09-19 NOTE — DISCHARGE SUMMARY
[facility] Cardiology Discharge Summary     Girma Chapman  1980  [id number]    Admission Date: 9/17/2022  Discharge Date: 09/19/22    Primary Discharge Diagnosis:   1.  Inferior STEMI    Secondary Discharge Diagnosis:   1.  Hypertension  2.  Hyperlipidemia  3.  Polycythemia vera  4.  Concern for underlying hypercoagulable disorder    Consults:   Consults     Date and Time Order Name Status Description    9/18/2022 10:07 AM Inpatient Hospitalist Consult Completed           Day of Discharge Exam:  Subjective: Mr. Chapman is a 42-year-old male who presented with an inferior STEMI, subsequent found to have significant thrombosis in the mid and distal RCA.  Underwent PCI without complication.  This morning, reports he is doing well without chest pain or chest discomfort.  No significant pain or difficulty at the right radial access site.  Nausea and vomiting have resolved.  Was able to tolerate breakfast this morning without difficulty.  Anticipating discharge home today.    Vital Signs:  Temp:  [97.7 °F (36.5 °C)-98.7 °F (37.1 °C)] 97.7 °F (36.5 °C)  Heart Rate:  [68-90] 75  Resp:  [12-22] 14  BP: ()/(65-96) 127/87    Physical Exam  Vitals and nursing note reviewed.   Constitutional:       General: He is not in acute distress.     Appearance: Normal appearance. He is not diaphoretic.   HENT:      Head: Normocephalic and atraumatic.   Cardiovascular:      Rate and Rhythm: Normal rate and regular rhythm.      Heart sounds: No murmur heard.     Comments: 2+ right radial pulse  Pulmonary:      Effort: Pulmonary effort is normal. No respiratory distress.      Breath sounds: Normal breath sounds. No stridor. No wheezing, rhonchi or rales.   Abdominal:      General: Bowel sounds are normal. There is no distension.      Palpations: Abdomen is soft.      Tenderness: There is no abdominal tenderness. There is no guarding or rebound.   Musculoskeletal:         General: No swelling. Normal range of motion.       Cervical back: Neck supple. No tenderness.   Skin:     General: Skin is warm and dry.   Neurological:      General: No focal deficit present.      Mental Status: He is alert and oriented to person, place, and time.   Psychiatric:         Mood and Affect: Mood normal.         Behavior: Behavior normal.         Hospital Course:   Mr. Chapman is a 42-year-old male with a past cardiac history significant for an inferior STEMI with prior PCI to the proximal RCA.  He presented to the [facility] on 9/17/2022 for evaluation of chest pain.  A subsequent ECG revealed subtle ST elevation in the inferior leads, and a code STEMI was activated.  He was subsequently found to have significant thrombosis of the proximal RCA distal to the prior stent, as well as in the mid and distal RCA.  He subsequently went PCI to the proximal, mid, and distal RCA with CHRISTOPHE.  The patient tolerated the procedure well without significant periprocedural complications.  On presentation, he was also noted to have significant leukocytosis as well as evidence of suspected polycythemia vera.  Due to concern for possible underlying hypercoagulable disorder, post PCI the patient was started on DAPT with aspirin and Brilinta in addition to Eliquis for anticoagulation.  A post MI echocardiogram shows overall preserved LV systolic function.  The patient did have difficulty with postintervention nausea and vomiting which was suspected be secondary to gastroenteritis.  On the day of discharge, his nausea vomiting is resolved and he is tolerating p.o. intake.  We will plan to continue triple therapy with aspirin, Brilinta, and Eliquis for 1 month then de-escalate to Brilinta and Eliquis.  He will additionally be referred to hematology for additional work-up of his polycythemia vera, leukocytosis, and possible underlying hypercoagulable disorder.    Procedures Performed  1.  Coronary angiogram with PCI 9/17/2022    Condition on Discharge:  Stable for discharge home    Discharge Disposition  Home or Self Care    Discharge Medications     Discharge Medications      New Medications      Instructions Start Date   apixaban 5 MG tablet tablet  Commonly known as: ELIQUIS   5 mg, Oral, Every 12 Hours Scheduled      ticagrelor 90 MG tablet tablet  Commonly known as: BRILINTA   90 mg, Oral, 2 Times Daily      ticagrelor 90 MG tablet tablet  Commonly known as: BRILINTA   90 mg, Oral, 2 Times Daily         Changes to Medications      Instructions Start Date   aspirin 81 MG EC tablet  What changed: Another medication with the same name was added. Make sure you understand how and when to take each.   81 mg, Oral, Daily      aspirin 81 MG EC tablet  What changed: You were already taking a medication with the same name, and this prescription was added. Make sure you understand how and when to take each.   81 mg, Oral, Daily   Start Date: September 20, 2022     atorvastatin 80 MG tablet  Commonly known as: LIPITOR  What changed: Another medication with the same name was added. Make sure you understand how and when to take each.   80 mg, Oral, Nightly      atorvastatin 80 MG tablet  Commonly known as: LIPITOR  What changed: You were already taking a medication with the same name, and this prescription was added. Make sure you understand how and when to take each.   80 mg, Oral, Nightly      carvedilol 6.25 MG tablet  Commonly known as: COREG  What changed: Another medication with the same name was added. Make sure you understand how and when to take each.   12.5 mg, Oral, 2 Times Daily With Meals      carvedilol 12.5 MG tablet  Commonly known as: COREG  What changed: You were already taking a medication with the same name, and this prescription was added. Make sure you understand how and when to take each.   12.5 mg, Oral, 2 Times Daily With Meals      lisinopril 5 MG tablet  Commonly known as: PRINIVIL,ZESTRIL  What changed: Another medication with the same name was  added. Make sure you understand how and when to take each.   5 mg, Oral, Daily      lisinopril 10 MG tablet  Commonly known as: PRINIVIL,ZESTRIL  What changed: You were already taking a medication with the same name, and this prescription was added. Make sure you understand how and when to take each.   10 mg, Oral, Daily   Start Date: September 20, 2022     NP Thyroid 30 MG tablet  Generic drug: Thyroid  What changed: Another medication with the same name was added. Make sure you understand how and when to take each.   30 mg, Oral, Daily      Thyroid 30 MG tablet  Commonly known as: ARMOUR  What changed: You were already taking a medication with the same name, and this prescription was added. Make sure you understand how and when to take each.   30 mg, Oral, Daily   Start Date: September 20, 2022        Continue These Medications      Instructions Start Date   dicyclomine 20 MG tablet  Commonly known as: BENTYL   20 mg, Oral, Every 6 Hours PRN      folic acid 1 MG tablet  Commonly known as: FOLVITE   1,000 mcg, Oral, Daily      hydrOXYzine 10 MG tablet  Commonly known as: ATARAX   10 mg, Nightly, PT STATES 20MG AT NIGHT         ASK your doctor about these medications      Instructions Start Date   citalopram 20 MG tablet  Commonly known as: CeleXA   20 mg, Oral, Daily             Discharge Diet: Cardiac diet    Activity at Discharge: Standard right wrist precautions    Follow-up Appointments  Future Appointments   Date Time Provider Department Center   10/20/2022 10:00 AM Megan Goldberg LCSW [facility]  [facility]   7/6/2023  1:00 PM Farhad Grady MD [facility] CD [facility] Grady MD  Interventional Cardiology   09/19/22  09:48 EDT    Time: Discharge 45 min    Please note that portions of this note may have been completed with a voice recognition program. Efforts were made to edit the dictations, but occasionally words are mistranscribed.

## 2022-09-19 NOTE — PROGRESS NOTES
[facility]    Name:  Girma Chapman   Age:  42 y.o.  Sex:  male  :  1980  MRN:  [id number]   Visit Number:  [id number]  Admission Date:  2022  Date Of Service:  22  Primary Care Physician:  Gabriel Mandel PA     LOS: 2 days :    Chief Complaint:      Follow-up of nausea and vomiting.    Subjective:    Mr. Chapman was seen and examined this morning.  He is currently sitting up on the bed and is comfortable at rest.  Denies any further episodes of nausea and vomiting.  He has been tolerating diet.  Denies any chest pain or shortness of breath.  No significant overnight events.    Review of Systems:     All systems were reviewed and negative except as mentioned in subjective, assessment and plan.    Vital Signs:    Temp:  [97.7 °F (36.5 °C)-98.7 °F (37.1 °C)] 97.7 °F (36.5 °C)  Heart Rate:  [68-90] 75  Resp:  [12-22] 14  BP: ()/(65-95) 127/87    Intake and output:    I/O last 3 completed shifts:  In: 0   Out: 2225 [Urine:1525; Emesis/NG output:700]  I/O this shift:  In: 150 [P.O.:150]  Out: -     Physical Examination:    General Appearance:  Alert and cooperative.   Head:  Atraumatic and normocephalic.   Eyes: Conjunctivae and sclerae normal, no icterus. No pallor.   Throat: No oral lesions, no thrush, oral mucosa moist.   Neck: Supple, trachea midline, no thyromegaly.   Lungs:   Breath sounds heard bilaterally equally.  No wheezing or crackles. No Pleural rub or bronchial breathing.   Heart:  Normal S1 and S2, no murmur, no gallop, no rub. No JVD.   Abdomen:   Normal bowel sounds, no masses, no organomegaly. Soft, nontender, nondistended, no rebound tenderness.   Extremities: Supple, no edema, no cyanosis, no clubbing.   Skin: No bleeding or rash.  Multiple tattoos noted.   Neurologic: Alert and oriented x 3. No facial asymmetry. Moves all four limbs. No tremors.      Laboratory results:    Results from last 7 days   Lab Units 22  3259  "09/18/22  0455 09/17/22  1219   SODIUM mmol/L 133* 134* 134*   POTASSIUM mmol/L 3.8 4.1 4.6   CHLORIDE mmol/L 97* 94* 97*   CO2 mmol/L 28.5 26.5 26.2   BUN mg/dL 12 10 9   CREATININE mg/dL 1.05 1.13 1.04   CALCIUM mg/dL 9.1 9.7 8.9   BILIRUBIN mg/dL 0.8  --  0.5   ALK PHOS U/L 73  --  74   ALT (SGPT) U/L 23  --  12   AST (SGOT) U/L 50*  --  13   GLUCOSE mg/dL 91 129* 141*     Results from last 7 days   Lab Units 09/19/22  0442 09/18/22  0455 09/17/22  1219   WBC 10*3/mm3 23.22* 30.87* 23.33*   HEMOGLOBIN g/dL 18.2* 19.4* 17.2   HEMATOCRIT % 52.4* 55.7* 49.2   PLATELETS 10*3/mm3 403 433 415         Results from last 7 days   Lab Units 09/17/22  1555 09/17/22  1219   TROPONIN T ng/mL 1.220* <0.010         No results for input(s): PHART, WKQ8WGN, PO2ART, BFI4TXT, BASEEXCESS in the last 8760 hours.   I have reviewed the patient's laboratory results.    Radiology results:    Cardiac Catheterization/Vascular Study    Result Date: 9/17/2022  Recommendations: · Enteric-coated aspirin indefinitely · Uninterrupted dual antiplatelet therapy (enteric-coated baby aspirin plus Brilinta) for minimum of 30 months.  Consideration given to indefinite dual antiplatelet therapy · Initiation of anticoagulation therapy with Eliquis 5 mg orally twice daily (\"off-label\" use) for recurrent arterial thrombosis (coronary and cerebral).  Hypercoagulable state suspected · Outpatient referral to hematology for hypercoagulable state work-up · Outpatient referral to cardiac rehab · Counseling regarding essential need to maintain compliance with medications · Echocardiogram in a.m. to evaluate LV systolic function post-ACS Impression: Severe single-vessel coronary artery disease with multiple sites of plaque rupture and coronary thrombosis within the RCA No evidence of in-stent restenosis Successful catheter-based revascularization of the proximal mid and distal RCA with placement of multiple drug-eluting stents Stent deployment optimization with " OCT Suspected hypercoagulable state leading to multiple episodes of arterial thrombosis.  No venous thrombosis history Indication: Acute inferior ST elevation myocardial infarction --------------------------------------------------------------------------- ------------------------------------------- Clinical History: This is a 42-year-old male patient with known coronary artery disease and suspected hypercoagulable state with prior cerebral and coronary thrombosis who presents to the emergency room with acute onset of severe anterior chest pressure and heaviness.  Initial twelve-lead electrocardiogram showed pathologic Q waves in the inferior leads with subtle J-point elevation but no highly suspicious findings for ST elevation myocardial infarction.  The patient had escalation of chest discomfort and repeat twelve-lead electrocardiogram showed evidence of inferior ST elevation myocardial infarction. Procedures performed: 1. Bilateral selective coronary angiography 2. Left heart catheterization 3. Balloon angioplasty x2-RCA 4. Drug-eluting stent placement x3-RCA 5. OCT x2-RCA  Access:   5\6 [place] Island[place] Slender arterial hemostasis sheath placed via right radial artery; arterial sheath removed in the cardiac catheterization laboratory with application of a transradial band for patent hemostasis. Procedure narrative: The procedure was explained in detail to the patient, including risks benefits and alternatives.  The patient expressed understanding and a desire to proceed. Delfino's testing demonstrated excellent collateral circulation to both hands.  The patient was brought to the cardiac catheterization laboratory where the right wrist was prepped and draped in usual sterile fashion.  One percent lidocaine was infiltrated into the skin overlying the right radial artery.  Access was obtained from the right radial artery utilizing the micropuncture technique and a 5\6 [place] Islander Slender hemostasis sheath was placed without  "difficulty.  The standard antispasm cocktail as well as 4000 units of unfractionated heparin was administered.  Retrograde catheterization was performed using a 6 [place] JR4 diagnostic catheter to engage  the right coronary artery and a 6 [place] Tiger diagnostic catheter to engage the left main coronary artery.  Hand injected angiograms was performed.  Contrast ventriculogram was not performed.  Left ventricular and aortic pullback pressures was obtained with the 6 [place] JR4 diagnostic catheter.  For the details of the interventional procedure, refer to the dictation below.  Estimated Blood Loss: Negligible Total Contrast: 240 mL Fluoro Time: 10.2-minute Radiation Dose: 1459 mGy (air kerma) Angiographic Findings: · Coronary circulation is right dominant · Left main: The left main coronary artery trifurcates into the left anterior descending ramus intermedius and circumflex coronary arteries.  The left main coronary artery has no significant disease. · LAD: The left anterior descending gives rise to the diagonal branches as well as multiple septal perforators before terminating as an apical recurrent branch.  The left anterior descending has minor luminal irregularities. · LCX: The ramus intermedius branch has minor luminal irregularities.  The circumflex coronary artery is a nondominant vessel giving rise to the obtuse marginal branches.  The circumflex coronary artery and its branches have minor luminal irregularities. · RCA: The right coronary artery is a large caliber ectatic vessel.  The vessel demonstrates severe tortuosity with a proximal \"inverted trujillo's crook\" anatomy.  The right coronary artery is dominant for the posterior circulation.  The proximal RCA has been previously stented.  The stent is widely patent without flow-limiting stenosis or evidence of in-stent restenosis.  There was an ostial less than 50% stenosis.  Coronary thrombus was identified just distal to the original RCA stent in an area " of severe tortuosity.  In addition, the distal RCA demonstrates a large thrombus burden.  Stenoses in these locations was greater than 90% severity. Hemodynamics: LV pressure: 100/ post a-10 mmHg Ao pressure: 100/70 Intervention Summary Lesion #1 Location:    Distal RCA Stenosis pre-PCI:   95% Stenosis post-PCI:  0% JENNIFER flow pre-PCI:  3 JENNIFER flow post-PCI:  3 ACC AHA class lesion: B OCT-distal RCA Proximal Edge Dissection: No Distal Edge Dissection: No Acceptable Stent Apposition (<3 mm length, >0.3 mm from wall): Yes Acceptable Stent Expansion Index (>0.8): Yes Goal MSA (>4.5 mm^2): Yes Lesion #2 Location:                               Mid RCA Pre-PCI stenosis:                    95% Post-PCI stenosis:                   0% Pre-PCI JENNIFER flow:                   3 Post-PCI JENNIFER flow:                  3 ACC AHA lesion class:             C OCT-mid RCA Proximal Edge Dissection: No Distal Edge Dissection: No Acceptable Stent Apposition (<3 mm length, >0.3 mm from wall): Yes Acceptable Stent Expansion Index (>0.8): Yes Goal MSA (>4.5 mm^2): Yes Lesion #3 Location:                                Proximal RCA Pre-PCI stenosis:                     70% Post-PCI stenosis:                    0% Pre-PCI JENNIFER flow:                    3 Post-PCI JENNIFER flow:                  3 ACC AHA lesion class:             A OCT-proximal RCA Proximal Edge Dissection: No Distal Edge Dissection: No Acceptable Stent Apposition (<3 mm length, >0.3 mm from wall): Yes Acceptable Stent Expansion Index (>0.8): Yes Goal MSA (>4.5 mm^2): Yes Guide:   6 Chinese AL 0.75 Anticoagulation:  Heparin The patient was given additional 4000 units of unfractionated heparin followed by 1000 units of unfractionated heparin yielding an ACT of 310.  The patient was started on a double Integrilin bolus and drip protocol.  After reengage in the right coronary artery with the guiding catheter, the multiple areas of subtotal occlusion throughout the right coronary artery was  crossed with a Run-through guidewire.  Balloon angioplasty was performed in the mid RCA utilizing a 4.0 x 20 mm TREK balloon dilated to 8 cody for 60 seconds.  Balloon angioplasty was then performed in the distal right coronary artery utilizing a 2.5 x 20 mm TREK balloon dilated to 8 cody for 60 seconds.  Stenting was then performed in the distal RCA utilizing a 2.75 x 15 mm Xience drug-eluting stent.  The proximal 2/3 of the stent was further dilated utilizing a 3.5 x 12mm NC TREK balloon dilated to 16 cody for 20 seconds.  OCT was performed which showed no proximal or distal edge dissection.  The stent was completely apposed to the vessel wall and completely expanded with differential sizing.  Next, stenting of the mid RCA was performed utilizing a 4.5 x 23 mm Xience drug-eluting stent dilated to 18 cody for 20 seconds.  This stent overlapped with the original proximal RCA stent.  A final 4.5 x 12 mm Xience drug-eluting stent was deployed proximal to the original stent with overlap.  OCT was performed which showed no evidence of proximal or distal edge dissection.  The stent was completely opposed to the vessel wall and completely expanded.  The angiographic result was excellent throughout. Complications: None apparent     XR Chest 1 View    Result Date: 9/17/2022  PORTABLE CHEST  9/17/2022 12:30 PM  HISTORY: Chest pain  COMPARISON:   None  FINDINGS: The cardiac silhouette is normal in size. The mediastinal and hilar contours are unremarkable.  The lungs are clear. There is no pneumothorax. The visualized osseous structures demonstrate no acute abnormalities.      Impression: No acute cardiopulmonary process.      This report was signed and finalized on 9/17/2022 12:56 PM by Jenifer Peng MD.    I have reviewed the patient's radiology reports.    Medication Review:     I have reviewed the patient's active and prn medications.     Problem List:      ST elevation myocardial infarction involving right coronary  artery (HCC)    Polycythemia    Essential hypertension    ST elevation myocardial infarction (STEMI), unspecified artery (HCC)    Nausea and vomiting    Assessment:    1. Nausea and vomiting likely secondary to medications, resolved.  2. Acute inferior wall STEMI status post stents to RCA on 9/17/2022.  3. Essential hypertension.  4. Polycythemia- will need outpatient follow-up with hematology.    Plan:    Mr. Chapman is currently feeling better with regards to his nausea and vomiting.  His abdomen is benign and he does not have any epigastric tenderness.  He likely had irritation from loading doses of aspirin and Brilinta.  He has been strongly advised to discontinue smoking.  He does not need any further medications for his nausea and vomiting since it has resolved.  He may be discharged from medical standpoint.    Patient does have history of polycythemia and has had prior history of phlebotomies.  We will schedule him to follow-up with Dr. Nicholson in 3 weeks.  I have discussed the patient's condition and discharge plan with Dr. Grady from cardiology.  Discussed with nursing staff at the bedside.      Bryant Davis MD  09/19/22  10:45 EDT    Dictated utilizing Dragon dictation.

## 2022-09-19 NOTE — CASE MANAGEMENT/SOCIAL WORK
Case Management Discharge Note             Pt is being d/c'd today. Pt being d/c'd before DCP able to get done. Plan is home.     Selected Continued Care - Admitted Since 9/17/2022     Destination    No services have been selected for the patient.              Durable Medical Equipment    No services have been selected for the patient.              Dialysis/Infusion    No services have been selected for the patient.              Home Medical Care    No services have been selected for the patient.              Therapy    No services have been selected for the patient.              Community Resources    No services have been selected for the patient.              Community & DME    No services have been selected for the patient.                  Transportation Services  Private: Car    Final Discharge Disposition Code: 01 - home or self-care

## 2022-09-19 NOTE — DISCHARGE INSTR - APPOINTMENTS
Follow up with PCP in 1-2 weeks      Follow up with Cardiology, Robina LENTZ, 10/19/22 2:00 pm.     Hematologist Dr. Walt Nicholson [phone] [address] [place]- Call to schedule

## 2022-09-19 NOTE — PLAN OF CARE
Goal Outcome Evaluation:           Progress: improving  Outcome Evaluation: Pt denies any chest pain.

## 2022-09-19 NOTE — NURSING NOTE
Referral received for Phase II Cardiac Rehab. Staff reviewed chart and patient has qualifying diagnosis for Phase II Cardiac Rehab.  Met with patient, discussed benefits of exercise, program protocol with ed. material provided.  Pt undecided at this time but states if he does decide to do program he will be using our facility with being closer to home.

## 2022-09-19 NOTE — DISCHARGE INSTRUCTIONS
Radial Artery Coronary Angiogram After Care    Refer to this sheet in the next few weeks. These instructions provide you with information on caring for yourself after your procedure. Your health care provider may also give you more specific instructions. Your treatment has been planned according to current medical practices, but problems sometimes occur. Call your health care provider if you have any problems or questions after your procedure.       Home Care Instructions:  You may shower the day after the procedure. Remove the bandage (dressing) and gently wash the site with plain soap and water. Gently pat the site dry. You may apply a band aid daily for 2 days if desired.    Do not apply powder or lotion to the site.  Do not submerge the affected site in water for 3 to 5 days or until the site is completely healed.   Do not flex or bend the affected arm for 24 hours.  Do not lift, push or pull anything over 10 pounds for 2 days after your procedure.  Do not operate machinery or power tools for 24 hours.  Inspect the site at least twice daily. You may notice some bruising at the site and it may be tender for 1 to 2 weeks.     Increase your fluid intake for the next 2 days.    Keep arm elevated for 24 hours.  For the remainder of the day, keep your arm in the “Pledge of Allegiance” position when up and about.    Limit your activity for the next 48 hours and avoid strenuous activity as directed by your physician.   Cardiac Rehab may or may not be ordered.  Please consult with your physician  You may drive 24 hours after the procedure unless otherwise instructed by your caregiver.  A responsible adult should be with you for the first 24 hours after you arrive home.   Do not make any important legal decisions or sign legal papers for 24 hours. Do not drink alcohol for 24 hours.    Take medicines only as directed by your health care provider.  Blood thinners may be prescribed after your procedure to improve blood flow  through the stent.    Metformin or any medications containing Metformin should not be taken for 48 hours after your procedure.    Eat a heart-healthy diet. This should include plenty of fresh fruits and vegetables. Meat should be lean cuts. Avoid the following types of food:    Food that is high in salt.    Canned or highly processed food.    Food that is high in saturated fat or sugar.    Fried food.    Make any other lifestyle changes recommended by your health care provider. This may include:    Not using any tobacco products including cigarettes, chewing tobacco, or electronic cigarettes.   Managing your weight.    Getting regular exercise.    Managing your blood pressure.    Limiting your alcohol intake.    Managing other health problems, such as diabetes.    Keep all follow-up visits as directed by your health care provider.      Call Your Doctor If:  You have unusual pain at the radial/ulnar (wrist) site.  You have redness, warmth, swelling, or pain at the radial/ulnar (wrist) site.  You have drainage (other than a small amount of blood on the dressing).  `You have chills or a fever > 101.  Your arm becomes pale or dark, cool, tingly, or numb.  You develop chest pain, shortness of breath, feel faint or pass out.    You have heavy bleeding from the site, hold pressure on the site for 20 minutes.  If the bleeding stops, apply a fresh bandage and call your cardiologist.  However, if you continue to have bleeding, call 911.

## 2022-09-20 ENCOUNTER — READMISSION MANAGEMENT (OUTPATIENT)
Dept: CALL CENTER | Facility: HOSPITAL | Age: 42
End: 2022-09-20

## 2022-09-20 NOTE — OUTREACH NOTE
AMI Week 1 Survey    Flowsheet Row Responses   [facility] patient discharged from? [place]   Does the patient have one of the following disease processes/diagnoses(primary or secondary)? Acute MI (STEMI,NSTEMI)   Week 1 attempt successful? Yes   Call start time 1619   Call end time 1628   Discharge diagnosis STEMI   Person spoke with today (if not patient) and relationship patient   Meds reviewed with patient/caregiver? Yes   Is the patient having any side effects they believe may be caused by any medication additions or changes? No   Does the patient have all prescriptions related to this admission filled (includes statins,anticoagulants,HTN meds,anti-arrhythmia meds) Yes   Is the patient taking all medications as directed (includes completed medication regime)? Yes   Does the patient have a primary care provider?  Yes   Does the patient have an appointment with their PCP,cardiologist,or clinic within 7 days of discharge? Yes   Comments regarding PCP 9/22/22   Has the patient kept scheduled appointments due by today? N/A   Comments Cardiologist 10/19/22   Psychosocial issues? No   Did the patient receive a copy of their discharge instructions? Yes   Nursing interventions Reviewed instructions with patient   What is the patient's perception of their health status since discharge? Improving   Nursing interventions Nurse provided patient education   Is the patient/caregiver able to teach back signs and symptoms of when to call for help immediately: Sudden discomfort in arms, back, neck or jaw, Sudden chest discomfort, Sudden sweating or clammy skin, Shortness of breath at any time, Dizziness or lightheadedness, Irregular or rapid heart rate, Nausea or vomiting   Nursing interventions Nurse provided patient education   Is the pateint /caregiver able to teach back the importance of cardiac rehab? Yes   Nursing interventions Provided education on importance of cardiac rehab   Is the patient/caregiver able to teach  back lifestyle changes to help prevent MIs Heart healthy diet, Maintaining a healthy weight, Regular exercise as approved by provider   Is the patient/caregiver able to teach back ways to prevent a second heart attack: Follow up with MD, Participate in Cardiac Rehab, Take medications   If the patient is a current smoker, are they able to teach back resources for cessation? Not a smoker   Is the patient/caregiver able to teach back the hierarchy of who to call/visit for symptoms/problems? PCP, Specialist, Home health nurse, Urgent Care, ED, 911 Yes   Week 1 call completed? Yes   Wrap up additional comments Pt states he is  doing ok. Pt shares he is having anxiety r/t fear of dying. Pt advised to consult with PCP about anxiety at PCP fu appt on 9/22/22. Reveiwed over cardiac warning s/s, and when to call 911. Pt has cardiologist fu appt on 10/19/22.           ROSS REYES - Registered Nurse

## 2022-09-20 NOTE — OUTREACH NOTE
Prep Survey    Flowsheet Row Responses   [facility] facility patient discharged from? [facility]   Is LACE score < 7 ? Yes   Emergency Room discharge w/ pulse ox? No   Eligibility Readm Mgmt   Discharge diagnosis STEMI   Does the patient have one of the following disease processes/diagnoses(primary or secondary)? Acute MI (STEMI,NSTEMI)   Does the patient have Home health ordered? No   Is there a DME ordered? No   Medication alerts for this patient see AVS   Prep survey completed? Yes          CONSTANCE PÉREZ - Registered Nurse

## 2022-09-26 ENCOUNTER — LAB (OUTPATIENT)
Dept: LAB | Facility: HOSPITAL | Age: 42
End: 2022-09-26

## 2022-09-26 ENCOUNTER — CONSULT (OUTPATIENT)
Dept: ONCOLOGY | Facility: CLINIC | Age: 42
End: 2022-09-26

## 2022-09-26 VITALS
SYSTOLIC BLOOD PRESSURE: 127 MMHG | TEMPERATURE: 97.5 F | HEIGHT: 69 IN | DIASTOLIC BLOOD PRESSURE: 88 MMHG | OXYGEN SATURATION: 98 % | BODY MASS INDEX: 25.92 KG/M2 | WEIGHT: 175 LBS | HEART RATE: 76 BPM

## 2022-09-26 DIAGNOSIS — D75.1 POLYCYTHEMIA: Primary | ICD-10-CM

## 2022-09-26 DIAGNOSIS — D72.829 LEUKOCYTOSIS, UNSPECIFIED TYPE: ICD-10-CM

## 2022-09-26 LAB
BASOPHILS # BLD AUTO: 0.08 10*3/MM3 (ref 0–0.2)
BASOPHILS NFR BLD AUTO: 0.9 % (ref 0–1.5)
DEPRECATED RDW RBC AUTO: 46.2 FL (ref 37–54)
EOSINOPHIL # BLD AUTO: 0.38 10*3/MM3 (ref 0–0.4)
EOSINOPHIL NFR BLD AUTO: 4.2 % (ref 0.3–6.2)
ERYTHROCYTE [DISTWIDTH] IN BLOOD BY AUTOMATED COUNT: 13.6 % (ref 12.3–15.4)
FERRITIN SERPL-MCNC: 209.1 NG/ML (ref 30–400)
HCT VFR BLD AUTO: 51.4 % (ref 37.5–51)
HGB BLD-MCNC: 17.3 G/DL (ref 13–17.7)
IMM GRANULOCYTES # BLD AUTO: 0.03 10*3/MM3 (ref 0–0.05)
IMM GRANULOCYTES NFR BLD AUTO: 0.3 % (ref 0–0.5)
IRON 24H UR-MRATE: 104 MCG/DL (ref 59–158)
IRON SATN MFR SERPL: 28 % (ref 20–50)
LYMPHOCYTES # BLD AUTO: 1.87 10*3/MM3 (ref 0.7–3.1)
LYMPHOCYTES NFR BLD AUTO: 20.8 % (ref 19.6–45.3)
MCH RBC QN AUTO: 31.4 PG (ref 26.6–33)
MCHC RBC AUTO-ENTMCNC: 33.7 G/DL (ref 31.5–35.7)
MCV RBC AUTO: 93.3 FL (ref 79–97)
MONOCYTES # BLD AUTO: 0.92 10*3/MM3 (ref 0.1–0.9)
MONOCYTES NFR BLD AUTO: 10.2 % (ref 5–12)
NEUTROPHILS NFR BLD AUTO: 5.72 10*3/MM3 (ref 1.7–7)
NEUTROPHILS NFR BLD AUTO: 63.6 % (ref 42.7–76)
NRBC BLD AUTO-RTO: 0 /100 WBC (ref 0–0.2)
PLATELET # BLD AUTO: 438 10*3/MM3 (ref 140–450)
PMV BLD AUTO: 9.9 FL (ref 6–12)
RBC # BLD AUTO: 5.51 10*6/MM3 (ref 4.14–5.8)
TIBC SERPL-MCNC: 374 MCG/DL (ref 298–536)
TRANSFERRIN SERPL-MCNC: 251 MG/DL (ref 200–360)
WBC NRBC COR # BLD: 9 10*3/MM3 (ref 3.4–10.8)

## 2022-09-26 PROCEDURE — 99205 OFFICE O/P NEW HI 60 MIN: CPT | Performed by: INTERNAL MEDICINE

## 2022-09-26 PROCEDURE — 81207 BCR/ABL1 GENE MINOR BP: CPT

## 2022-09-26 PROCEDURE — 85025 COMPLETE CBC W/AUTO DIFF WBC: CPT

## 2022-09-26 PROCEDURE — 81279 JAK2 GENE TRGT SEQUENCE ALYS: CPT

## 2022-09-26 PROCEDURE — 83540 ASSAY OF IRON: CPT

## 2022-09-26 PROCEDURE — 88185 FLOWCYTOMETRY/TC ADD-ON: CPT

## 2022-09-26 PROCEDURE — 81219 CALR GENE COM VARIANTS: CPT

## 2022-09-26 PROCEDURE — 82728 ASSAY OF FERRITIN: CPT

## 2022-09-26 PROCEDURE — 81270 JAK2 GENE: CPT

## 2022-09-26 PROCEDURE — 81338 MPL GENE COMMON VARIANTS: CPT

## 2022-09-26 PROCEDURE — 36415 COLL VENOUS BLD VENIPUNCTURE: CPT

## 2022-09-26 PROCEDURE — 81206 BCR/ABL1 GENE MAJOR BP: CPT

## 2022-09-26 PROCEDURE — 88184 FLOWCYTOMETRY/ TC 1 MARKER: CPT

## 2022-09-26 PROCEDURE — 84466 ASSAY OF TRANSFERRIN: CPT

## 2022-09-26 NOTE — PROGRESS NOTES
[facility]ology [facility] [place]  Office number [phone]    Fax number [phone]    New Patient Office Visit      Date: 2022     Patient Name: Girma Chapman  MRN: [id number]  : 1980    Referring Physician: Dr. Farhad MIGUEL    Chief Complaint: Polycythemia vera      History of Present Illness: Girma Chapman is a pleasant 42 y.o. male who presents today for evaluation of polycythemia, CVA, and recurrent myocardial infarction.    He initially presented with asymptomatic right MCA territory stroke in 2018.  He was admitted to [facility].  Echocardiogram showed no thrombus or PFO.  His TSH was noted to be 18 with a mildly depressed T4 suggestive of subclinical hypothyroidism.  Hypercoagulable work-up was obtained.  Angiogram reportedly was suggestive of drug-induced vasculopathy, but without any history of illicit drug use, this was attributed possibly to being on over-the-counter supplements to augment his bodybuilding. polycythemia a symptomatic right MCA territory factor V Leiden and prothrombin gene mutation negative Antithrombin III normal protein S and protein C normal lupus anticoagulant negative anticardiolipin antibodies normal beta-2 glycoprotein antibodies negative factor VIII activity normal.  CBC from that admission was notable for a WBC count of 9.7; hemoglobin of 19; hematocrit of 54; and platelet count of 311.    He was subsequently evaluated by Dr. Jb Mcginnis, a hematologist at [facility] and was diagnosed with polycythemia vera.  He does not recall having a bone marrow biopsy.  He does recall being on phlebotomy every couple weeks for approximately 2 years and discontinued this in .    In 2021 he suffered a ST elevation myocardial infarction with coronary angiogram from 2021 showing severe thrombotic stenosis of the proximal to mid RCA.  He underwent drug-eluting stent placement to the mid RCA and was started on aspirin and Plavix.  He  completed 1 year of dual antiplatelet therapy and discontinued both aspirin and Plavix around August 2022.    He was admitted in September 2022 with an inferior ST elevation MI and found to have significant thrombosis in the mid and distal RCA.  He underwent stenting and was discharged on aspirin, Brilinta, and Eliquis.  CBC was notable for a WBC count of 23 with elevated monocytes and eosinophils as well as an absolute leukocyte count of 4.2 and ANC of 16; his hemoglobin has ranged from 17-19 and his platelet count was normal.    He endorses substantial depression for which she is on citalopram.  He endorsed suicidal ideation approximately a week ago and was evaluated at [facility].  He is now established with a counselor and denies any suicidal ideation.  He says he is feeling hopeful for the first time in a few years.  He is frustrated by his physical limitations as he was previously a , and now feels like he is unable to maintain the work activity because of his recurrent thrombotic episodes.  He endorses poor appetite, night sweats, and fatigue.    Review of Systems:   I have reviewed the review of systems completed by the patient. This is negative for clinically significant symptoms except as noted below. This document has been scanned into the patient's chart.    Past Medical History:   Past Medical History:   Diagnosis Date   • Anxiety    • Depression    • Glaucoma    • Hypertension    • Screening for neurological condition    • Seizures (HCC)    • Self-injurious behavior    • Stroke (HCC)        Past Surgical History:   Past Surgical History:   Procedure Laterality Date   • CARDIAC CATHETERIZATION N/A 7/14/2021    Procedure: CORONARY ANGIOGRAPHY;  Surgeon: Farhad Grady MD;  Location: [facility] CATH INVASIVE LOCATION;  Service: Cardiology;  Laterality: N/A;   • CARDIAC CATHETERIZATION N/A 7/14/2021    Procedure: Percutaneous Coronary Intervention;  Surgeon: Farhad Grady MD;   Location: [facility] CATH INVASIVE LOCATION;  Service: Cardiology;  Laterality: N/A;   • CARDIAC CATHETERIZATION N/A 2021    Procedure: Optical Coherent Tomography;  Surgeon: Farhda Grady MD;  Location: [facility] CATH INVASIVE LOCATION;  Service: Cardiology;  Laterality: N/A;   • CARDIAC CATHETERIZATION N/A 2022    Procedure: Left Heart Cath;  Surgeon: René Solomon MD;  Location: [facility] CATH INVASIVE LOCATION;  Service: Cardiology;  Laterality: N/A;   • CARDIAC CATHETERIZATION N/A 2022    Procedure: Percutaneous Coronary Intervention;  Surgeon: René Solomon MD;  Location: [facility] CATH INVASIVE LOCATION;  Service: Cardiology;  Laterality: N/A;   • FRACTURE SURGERY Right     ankle       Family History:   Family History   Problem Relation Age of Onset   • Dementia Maternal Grandmother    • Anxiety disorder Maternal Grandmother    • Alcohol abuse Mother    • Anxiety disorder Mother    • Alcohol abuse Father    • Drug abuse Father    • Seizures Father    • OCD Maternal Grandfather    • Drug abuse Paternal Grandfather    • Drug abuse Paternal Grandmother    • Schizophrenia Cousin    • ADD / ADHD Neg Hx    • Bipolar disorder Neg Hx    • Paranoid behavior Neg Hx    • Self-Injurious Behavior  Neg Hx    • Suicide Attempts Neg Hx        Social History:   Social History     Socioeconomic History   • Marital status: Single   Tobacco Use   • Smoking status: Former Smoker     Types: Cigarettes     Quit date:      Years since quittin.7   • Smokeless tobacco: Former User     Types: Snuff     Quit date:    Vaping Use   • Vaping Use: Never used   Substance and Sexual Activity   • Alcohol use: Yes     Comment: OCC/ once a month   • Drug use: Yes     Types: Marijuana     Comment: RARELY   • Sexual activity: Defer       Medications:     Current Outpatient Medications:   •  apixaban (ELIQUIS) 5 MG tablet tablet, Take 1 tablet by mouth Every 12 (Twelve) Hours. Indications: Other - full anticoagulation,  "Disp: 180 tablet, Rfl: 3  •  aspirin 81 MG EC tablet, Take 1 tablet by mouth Daily., Disp: 180 tablet, Rfl: 3  •  atorvastatin (LIPITOR) 80 MG tablet, Take 1 tablet by mouth Every Night., Disp: 90 tablet, Rfl: 3  •  carvedilol (COREG) 12.5 MG tablet, Take 1 tablet by mouth 2 (Two) Times a Day With Meals., Disp: 180 tablet, Rfl: 3  •  citalopram (CeleXA) 20 MG tablet, Take 20 mg by mouth Daily., Disp: , Rfl:   •  dicyclomine (BENTYL) 20 MG tablet, Take 1 tablet by mouth Every 6 (Six) Hours As Needed (Abdominal cramping)., Disp: 12 tablet, Rfl: 0  •  folic acid (FOLVITE) 1 MG tablet, Take 1,000 mcg by mouth Daily., Disp: , Rfl:   •  hydrOXYzine (ATARAX) 10 MG tablet, 10 mg Every Night. PT STATES 20MG AT NIGHT, Disp: , Rfl:   •  lisinopril (PRINIVIL,ZESTRIL) 10 MG tablet, Take 1 tablet by mouth Daily., Disp: 90 tablet, Rfl: 3  •  NP Thyroid 30 MG tablet, Take 30 mg by mouth Daily., Disp: , Rfl:   •  ticagrelor (BRILINTA) 90 MG tablet tablet, Take 1 tablet by mouth 2 (Two) Times a Day., Disp: 180 tablet, Rfl: 3  •  carvedilol (COREG) 6.25 MG tablet, Take 2 tablets by mouth 2 (Two) Times a Day With Meals., Disp: 180 tablet, Rfl: 3  •  lisinopril (PRINIVIL,ZESTRIL) 5 MG tablet, Take 1 tablet by mouth Daily., Disp: 90 tablet, Rfl: 3  •  thyroid (ARMOUR) 30 MG tablet, Take 1 tablet by mouth Daily., Disp: , Rfl:   •  ticagrelor (BRILINTA) 90 MG tablet tablet, Take 1 tablet by mouth 2 (Two) Times a Day., Disp: 60 tablet, Rfl: 11    Allergies:   No Known Allergies    Objective     Vital Signs:   Vitals:    09/26/22 1146   BP: 127/88   Pulse: 76   Temp: 97.5 °F (36.4 °C)   TempSrc: Infrared   SpO2: 98%   Weight: 79.4 kg (175 lb)   Height: 175.3 cm (69\")    Body mass index is 25.84 kg/m².   There were no vitals filed for this visit.    Physical Exam:   General: No acute distress. Well appearing   HEENT: Normocephalic, atraumatic. Sclera anicteric. Masked.  Neck: supple, no adenopathy.   Cardiovascular: regular rate and rhythm,. " No murmurs.   Respiratory: Normal rate. Clear to auscultation bilaterally.  Abdomen: Soft, nontender, non distended with normoactive bowel sounds. No hepatosplenomegaly  Lymph: no cervical, supraclavicular or axillary adenopathy.  Neuro: Alert and oriented x 3. No focal deficits.   Ext: Symmetric, no swelling.   Psych: Euthymic      Laboratory/Imaging Reviewed:   Component      Latest Ref Rng & Units 9/19/2022 9/23/2022   Glucose      65 - 99 mg/dL 91    BUN      6 - 20 mg/dL 12    Creatinine      0.76 - 1.27 mg/dL 1.05    Sodium      136 - 145 mmol/L 133 (L)    Potassium      3.5 - 5.2 mmol/L 3.8    Chloride      98 - 107 mmol/L 97 (L)    CO2      22.0 - 29.0 mmol/L 28.5    Calcium      8.6 - 10.5 mg/dL 9.1    Total Protein      6.0 - 8.5 g/dL 7.1    Albumin      3.50 - 5.20 g/dL 3.10 (L)    ALT (SGPT)      1 - 41 U/L 23    AST (SGOT)      1 - 40 U/L 50 (H)    Alkaline Phosphatase      39 - 117 U/L 73    Total Bilirubin      0.0 - 1.2 mg/dL 0.8    Globulin      gm/dL 4.0    A/G Ratio      g/dL 0.8    BUN/Creatinine Ratio      7.0 - 25.0 11.4    Anion Gap      5.0 - 15.0 mmol/L 7.5    eGFR      >60.0 mL/min/1.73 92.0    WBC      3.70 - 10.30 10*3/uL 23.22 (H) 18.71 (H)   RBC      4.60 - 6.10 10*6/uL 5.80 5.70   Hemoglobin      13.7 - 17.5 g/dL 18.2 (H) 17.8 (H)   Hematocrit      40.0 - 51.0 % 52.4 (H) 52.7 (H)   MCV      79 - 98 fL 90.3 93   MCH      26.0 - 32.0 pg 31.4 31.2   MCHC      30.7 - 35.5 g/dL 34.7 33.8   RDW      11.5 - 14.5 % 14.1 13.5   RDW-SD      37.0 - 54.0 fl 46.5    MPV      8.8 - 12.5 fL 9.5 9.3   Platelets      155 - 369 10*3/uL 403 370 (H)   nRBC      <=0.0 per 100 WBCs  0.0   HIV Screen 4th Gen w/RFX (Reference)      Nonreactive  Nonreactive   HCV Qual      Negative  Negative   Ethanol      <10 mg/dL  <10   TSH Pregnancy      0.40 - 4.20 uIU/mL  10.80 (H)     Lab on 09/26/2022   Component Date Value Ref Range Status   • Iron 09/26/2022 104  59 - 158 mcg/dL Final   • Iron Saturation 09/26/2022  28  20 - 50 % Final   • Transferrin 09/26/2022 251  200 - 360 mg/dL Final   • TIBC 09/26/2022 374  298 - 536 mcg/dL Final   • Ferritin 09/26/2022 209.10  30.00 - 400.00 ng/mL Final   • WBC 09/26/2022 9.00  3.40 - 10.80 10*3/mm3 Final   • RBC 09/26/2022 5.51  4.14 - 5.80 10*6/mm3 Final   • Hemoglobin 09/26/2022 17.3  13.0 - 17.7 g/dL Final   • Hematocrit 09/26/2022 51.4 (A) 37.5 - 51.0 % Final   • MCV 09/26/2022 93.3  79.0 - 97.0 fL Final   • MCH 09/26/2022 31.4  26.6 - 33.0 pg Final   • MCHC 09/26/2022 33.7  31.5 - 35.7 g/dL Final   • RDW 09/26/2022 13.6  12.3 - 15.4 % Final   • RDW-SD 09/26/2022 46.2  37.0 - 54.0 fl Final   • MPV 09/26/2022 9.9  6.0 - 12.0 fL Final   • Platelets 09/26/2022 438  140 - 450 10*3/mm3 Final   • Neutrophil % 09/26/2022 63.6  42.7 - 76.0 % Final   • Lymphocyte % 09/26/2022 20.8  19.6 - 45.3 % Final   • Monocyte % 09/26/2022 10.2  5.0 - 12.0 % Final   • Eosinophil % 09/26/2022 4.2  0.3 - 6.2 % Final   • Basophil % 09/26/2022 0.9  0.0 - 1.5 % Final   • Immature Grans % 09/26/2022 0.3  0.0 - 0.5 % Final   • Neutrophils, Absolute 09/26/2022 5.72  1.70 - 7.00 10*3/mm3 Final   • Lymphocytes, Absolute 09/26/2022 1.87  0.70 - 3.10 10*3/mm3 Final   • Monocytes, Absolute 09/26/2022 0.92 (A) 0.10 - 0.90 10*3/mm3 Final   • Eosinophils, Absolute 09/26/2022 0.38  0.00 - 0.40 10*3/mm3 Final   • Basophils, Absolute 09/26/2022 0.08  0.00 - 0.20 10*3/mm3 Final   • Immature Grans, Absolute 09/26/2022 0.03  0.00 - 0.05 10*3/mm3 Final   • nRBC 09/26/2022 0.0  0.0 - 0.2 /100 WBC Final   Admission on 09/17/2022, Discharged on 09/19/2022   Component Date Value Ref Range Status   • Glucose 09/17/2022 141 (A) 65 - 99 mg/dL Final   • BUN 09/17/2022 9  6 - 20 mg/dL Final   • Creatinine 09/17/2022 1.04  0.76 - 1.27 mg/dL Final   • Sodium 09/17/2022 134 (A) 136 - 145 mmol/L Final   • Potassium 09/17/2022 4.6  3.5 - 5.2 mmol/L Final   • Chloride 09/17/2022 97 (A) 98 - 107 mmol/L Final   • CO2 09/17/2022 26.2  22.0 -  29.0 mmol/L Final   • Calcium 09/17/2022 8.9  8.6 - 10.5 mg/dL Final   • Total Protein 09/17/2022 7.1  6.0 - 8.5 g/dL Final   • Albumin 09/17/2022 3.50  3.50 - 5.20 g/dL Final   • ALT (SGPT) 09/17/2022 12  1 - 41 U/L Final   • AST (SGOT) 09/17/2022 13  1 - 40 U/L Final   • Alkaline Phosphatase 09/17/2022 74  39 - 117 U/L Final   • Total Bilirubin 09/17/2022 0.5  0.0 - 1.2 mg/dL Final   • Globulin 09/17/2022 3.6  gm/dL Final   • A/G Ratio 09/17/2022 1.0  g/dL Final   • BUN/Creatinine Ratio 09/17/2022 8.7  7.0 - 25.0 Final   • Anion Gap 09/17/2022 10.8  5.0 - 15.0 mmol/L Final   • eGFR 09/17/2022 93.1  >60.0 mL/min/1.73 Final    National Kidney Foundation and American Society of Nephrology (ASN) Task Force recommended calculation based on the Chronic Kidney Disease Epidemiology Collaboration (CKD-EPI) equation refit without adjustment for race.   • Troponin T 09/17/2022 <0.010  0.000 - 0.030 ng/mL Final   • Extra Tube 09/17/2022 Hold for add-ons.   Final    Auto resulted.   • Extra Tube 09/17/2022 hold for add-on   Final    Auto resulted   • Extra Tube 09/17/2022 Hold for add-ons.   Final    Auto resulted.   • Extra Tube 09/17/2022 Hold for add-ons.   Final    Auto resulted   • WBC 09/17/2022 23.33 (A) 3.40 - 10.80 10*3/mm3 Final   • RBC 09/17/2022 5.42  4.14 - 5.80 10*6/mm3 Final   • Hemoglobin 09/17/2022 17.2  13.0 - 17.7 g/dL Final   • Hematocrit 09/17/2022 49.2  37.5 - 51.0 % Final   • MCV 09/17/2022 90.8  79.0 - 97.0 fL Final   • MCH 09/17/2022 31.7  26.6 - 33.0 pg Final   • MCHC 09/17/2022 35.0  31.5 - 35.7 g/dL Final   • RDW 09/17/2022 13.6  12.3 - 15.4 % Final   • RDW-SD 09/17/2022 45.5  37.0 - 54.0 fl Final   • MPV 09/17/2022 9.0  6.0 - 12.0 fL Final   • Platelets 09/17/2022 415  140 - 450 10*3/mm3 Final   • Neutrophil % 09/17/2022 70.9  42.7 - 76.0 % Final   • Lymphocyte % 09/17/2022 18.0 (A) 19.6 - 45.3 % Final   • Monocyte % 09/17/2022 8.3  5.0 - 12.0 % Final   • Eosinophil % 09/17/2022 1.8  0.3 - 6.2 %  Final   • Basophil % 09/17/2022 0.6  0.0 - 1.5 % Final   • Immature Grans % 09/17/2022 0.4  0.0 - 0.5 % Final   • Neutrophils, Absolute 09/17/2022 16.52 (A) 1.70 - 7.00 10*3/mm3 Final   • Lymphocytes, Absolute 09/17/2022 4.20 (A) 0.70 - 3.10 10*3/mm3 Final   • Monocytes, Absolute 09/17/2022 1.94 (A) 0.10 - 0.90 10*3/mm3 Final   • Eosinophils, Absolute 09/17/2022 0.42 (A) 0.00 - 0.40 10*3/mm3 Final   • Basophils, Absolute 09/17/2022 0.15  0.00 - 0.20 10*3/mm3 Final   • Immature Grans, Absolute 09/17/2022 0.10 (A) 0.00 - 0.05 10*3/mm3 Final   • nRBC 09/17/2022 0.0  0.0 - 0.2 /100 WBC Final   • RBC Morphology 09/17/2022 Normal  Normal Final   • WBC Morphology 09/17/2022 Normal  Normal Final   • Platelet Estimate 09/17/2022 Adequate  Normal Final   • Troponin T 09/17/2022 1.220 (A) 0.000 - 0.030 ng/mL Final   • WBC 09/18/2022 30.87 (A) 3.40 - 10.80 10*3/mm3 Final   • RBC 09/18/2022 6.18 (A) 4.14 - 5.80 10*6/mm3 Final   • Hemoglobin 09/18/2022 19.4 (A) 13.0 - 17.7 g/dL Final   • Hematocrit 09/18/2022 55.7 (A) 37.5 - 51.0 % Final   • MCV 09/18/2022 90.1  79.0 - 97.0 fL Final   • MCH 09/18/2022 31.4  26.6 - 33.0 pg Final   • MCHC 09/18/2022 34.8  31.5 - 35.7 g/dL Final   • RDW 09/18/2022 14.0  12.3 - 15.4 % Final   • RDW-SD 09/18/2022 45.7  37.0 - 54.0 fl Final   • MPV 09/18/2022 9.0  6.0 - 12.0 fL Final   • Platelets 09/18/2022 433  140 - 450 10*3/mm3 Final   • Glucose 09/18/2022 129 (A) 65 - 99 mg/dL Final   • BUN 09/18/2022 10  6 - 20 mg/dL Final   • Creatinine 09/18/2022 1.13  0.76 - 1.27 mg/dL Final   • Sodium 09/18/2022 134 (A) 136 - 145 mmol/L Final   • Potassium 09/18/2022 4.1  3.5 - 5.2 mmol/L Final   • Chloride 09/18/2022 94 (A) 98 - 107 mmol/L Final   • CO2 09/18/2022 26.5  22.0 - 29.0 mmol/L Final   • Calcium 09/18/2022 9.7  8.6 - 10.5 mg/dL Final   • BUN/Creatinine Ratio 09/18/2022 8.8  7.0 - 25.0 Final   • Anion Gap 09/18/2022 13.5  5.0 - 15.0 mmol/L Final   • eGFR 09/18/2022 84.3  >60.0 mL/min/1.73 Final     National Kidney Foundation and American Society of Nephrology (ASN) Task Force recommended calculation based on the Chronic Kidney Disease Epidemiology Collaboration (CKD-EPI) equation refit without adjustment for race.   • Hemoglobin A1C 09/18/2022 5.00  4.80 - 5.60 % Final   • Total Cholesterol 09/18/2022 235 (A) 0 - 200 mg/dL Final   • Triglycerides 09/18/2022 61  0 - 150 mg/dL Final   • HDL Cholesterol 09/18/2022 51  40 - 60 mg/dL Final   • LDL Cholesterol  09/18/2022 174 (A) 0 - 100 mg/dL Final   • VLDL Cholesterol 09/18/2022 10  5 - 40 mg/dL Final   • LDL/HDL Ratio 09/18/2022 3.37   Final   • Target HR (85%) 09/18/2022 153  bpm Final   • Max. Pred. HR (100%) 09/18/2022 180  bpm Final   • RV S' 09/18/2022 15.5  cm/sec Final   • RV Base 09/18/2022 3.6  cm Final   • LA ESV Index (BP) 09/18/2022 15.4  ml/m2 Final   • Avg E/e' ratio 09/18/2022 6.55   Final   • Ao root diam 09/18/2022 3.5  cm Final   • Ao pk eliel 09/18/2022 112.0  cm/sec Final   • Ao V2 VTI 09/18/2022 19.9  cm Final   • TRAVIS(I,D) 09/18/2022 2.25  cm2 Final   • EDV(cubed) 09/18/2022 84.0  ml Final   • EDV(MOD-sp2) 09/18/2022 74.2  ml Final   • EDV(MOD-sp4) 09/18/2022 88.6  ml Final   • EF(MOD-bp) 09/18/2022 64.2  % Final   • EF(MOD-sp2) 09/18/2022 66.0  % Final   • EF(MOD-sp4) 09/18/2022 59.8  % Final   • ESV(cubed) 09/18/2022 24.9  ml Final   • ESV(MOD-sp2) 09/18/2022 25.2  ml Final   • ESV(MOD-sp4) 09/18/2022 35.6  ml Final   • IVS/LVPW 09/18/2022 1.02  cm Final   • Lat Peak E' Eliel 09/18/2022 8.5  cm/sec Final   • LV mass(C)d 09/18/2022 202.8  grams Final   • LV V1 max PG 09/18/2022 4.1  mmHg Final   • LV V1 mean PG 09/18/2022 2.00  mmHg Final   • LV V1 max 09/18/2022 101.0  cm/sec Final   • LVPWd 09/18/2022 1.24  cm Final   • Med Peak E' Eliel 09/18/2022 7.2  cm/sec Final   • MV dec time 09/18/2022 0.13  msec Final   • MV V2 VTI 09/18/2022 17.0  cm Final   • MVA(VTI) 09/18/2022 2.6  cm2 Final   • PA acc time 09/18/2022 0.10  sec Final   • PA pr(Accel)  09/18/2022 36.3  mmHg Final   • PA V2 max 09/18/2022 88.8  cm/sec Final   • RAP systole 09/18/2022 3.0  mmHg Final   • SI(MOD-sp2) 09/18/2022 25.3  ml/m2 Final   • SI(MOD-sp4) 09/18/2022 27.4  ml/m2 Final   • SV(LVOT) 09/18/2022 44.8  ml Final   • SV(MOD-sp2) 09/18/2022 49.0  ml Final   • SV(MOD-sp4) 09/18/2022 53.0  ml Final   • Ao max PG 09/18/2022 5.0  mmHg Final   • Ao mean PG 09/18/2022 3.0  mmHg Final   • FS 09/18/2022 33.3  % Final   • IVSd 09/18/2022 1.27  cm Final   • LA dimension (2D)  09/18/2022 3.2  cm Final   • LV V1 VTI 09/18/2022 16.3  cm Final   • LVIDd 09/18/2022 4.4  cm Final   • LVIDs 09/18/2022 2.9  cm Final   • LVOT area 09/18/2022 2.7  cm2 Final   • LVOT diam 09/18/2022 1.87  cm Final   • MV E/A 09/18/2022 1.03   Final   • MV max PG 09/18/2022 1.55  mmHg Final   • MV mean PG 09/18/2022 1.00  mmHg Final   • MV A max tessa 09/18/2022 50.0  cm/sec Final   • MV E max tessa 09/18/2022 51.4  cm/sec Final   • LV Fang Vol (BSA corrected) 09/18/2022 45.7  cm2 Final   • LV Sys Vol (BSA corrected) 09/18/2022 18.4  cm2 Final   • TAPSE (>1.6) 09/18/2022 2.7  cm Final   • Lipase 09/18/2022 15  13 - 60 U/L Final   • Glucose 09/19/2022 91  65 - 99 mg/dL Final   • BUN 09/19/2022 12  6 - 20 mg/dL Final   • Creatinine 09/19/2022 1.05  0.76 - 1.27 mg/dL Final   • Sodium 09/19/2022 133 (A) 136 - 145 mmol/L Final   • Potassium 09/19/2022 3.8  3.5 - 5.2 mmol/L Final   • Chloride 09/19/2022 97 (A) 98 - 107 mmol/L Final   • CO2 09/19/2022 28.5  22.0 - 29.0 mmol/L Final   • Calcium 09/19/2022 9.1  8.6 - 10.5 mg/dL Final   • Total Protein 09/19/2022 7.1  6.0 - 8.5 g/dL Final   • Albumin 09/19/2022 3.10 (A) 3.50 - 5.20 g/dL Final   • ALT (SGPT) 09/19/2022 23  1 - 41 U/L Final   • AST (SGOT) 09/19/2022 50 (A) 1 - 40 U/L Final   • Alkaline Phosphatase 09/19/2022 73  39 - 117 U/L Final   • Total Bilirubin 09/19/2022 0.8  0.0 - 1.2 mg/dL Final   • Globulin 09/19/2022 4.0  gm/dL Final   • A/G Ratio 09/19/2022 0.8  g/dL Final   •  "BUN/Creatinine Ratio 09/19/2022 11.4  7.0 - 25.0 Final   • Anion Gap 09/19/2022 7.5  5.0 - 15.0 mmol/L Final   • eGFR 09/19/2022 92.0  >60.0 mL/min/1.73 Final    National Kidney Foundation and American Society of Nephrology (ASN) Task Force recommended calculation based on the Chronic Kidney Disease Epidemiology Collaboration (CKD-EPI) equation refit without adjustment for race.   • WBC 09/19/2022 23.22 (A) 3.40 - 10.80 10*3/mm3 Final   • RBC 09/19/2022 5.80  4.14 - 5.80 10*6/mm3 Final   • Hemoglobin 09/19/2022 18.2 (A) 13.0 - 17.7 g/dL Final   • Hematocrit 09/19/2022 52.4 (A) 37.5 - 51.0 % Final   • MCV 09/19/2022 90.3  79.0 - 97.0 fL Final   • MCH 09/19/2022 31.4  26.6 - 33.0 pg Final   • MCHC 09/19/2022 34.7  31.5 - 35.7 g/dL Final   • RDW 09/19/2022 14.1  12.3 - 15.4 % Final   • RDW-SD 09/19/2022 46.5  37.0 - 54.0 fl Final   • MPV 09/19/2022 9.5  6.0 - 12.0 fL Final   • Platelets 09/19/2022 403  140 - 450 10*3/mm3 Final       Adult Transthoracic Echo Complete W/ Cont if Necessary Per Protocol    Result Date: 9/19/2022  Narrative: 1.  Normal left ventricular size and systolic function, LVEF 55-60%. 2.  Mild hypokinesis of the inferior wall. 3.  Normal LV diastolic filling pattern. 4.  Normal right ventricular size and systolic function. 5.  Mild to moderate concentric LVH. 6.  Normal left atrial volume index. 7.  No significant valvular abnormalities.    Cardiac Catheterization/Vascular Study    Result Date: 9/17/2022  Narrative: Recommendations: · Enteric-coated aspirin indefinitely · Uninterrupted dual antiplatelet therapy (enteric-coated baby aspirin plus Brilinta) for minimum of 30 months.  Consideration given to indefinite dual antiplatelet therapy · Initiation of anticoagulation therapy with Eliquis 5 mg orally twice daily (\"off-label\" use) for recurrent arterial thrombosis (coronary and cerebral).  Hypercoagulable state suspected · Outpatient referral to hematology for hypercoagulable state work-up · " Outpatient referral to cardiac rehab · Counseling regarding essential need to maintain compliance with medications · Echocardiogram in a.m. to evaluate LV systolic function post-ACS Impression: Severe single-vessel coronary artery disease with multiple sites of plaque rupture and coronary thrombosis within the RCA No evidence of in-stent restenosis Successful catheter-based revascularization of the proximal mid and distal RCA with placement of multiple drug-eluting stents Stent deployment optimization with OCT Suspected hypercoagulable state leading to multiple episodes of arterial thrombosis.  No venous thrombosis history Indication: Acute inferior ST elevation myocardial infarction --------------------------------------------------------------------------- ------------------------------------------- Clinical History: This is a 42-year-old male patient with known coronary artery disease and suspected hypercoagulable state with prior cerebral and coronary thrombosis who presents to the emergency room with acute onset of severe anterior chest pressure and heaviness.  Initial twelve-lead electrocardiogram showed pathologic Q waves in the inferior leads with subtle J-point elevation but no highly suspicious findings for ST elevation myocardial infarction.  The patient had escalation of chest discomfort and repeat twelve-lead electrocardiogram showed evidence of inferior ST elevation myocardial infarction. Procedures performed: 1. Bilateral selective coronary angiography 2. Left heart catheterization 3. Balloon angioplasty x2-RCA 4. Drug-eluting stent placement x3-RCA 5. OCT x2-RCA  Access:   5\6 [place] Slender arterial hemostasis sheath placed via right radial artery; arterial sheath removed in the cardiac catheterization laboratory with application of a transradial band for patent hemostasis. Procedure narrative: The procedure was explained in detail to the patient, including risks benefits and alternatives.  The  patient expressed understanding and a desire to proceed. Delfino's testing demonstrated excellent collateral circulation to both hands.  The patient was brought to the cardiac catheterization laboratory where the right wrist was prepped and draped in usual sterile fashion.  One percent lidocaine was infiltrated into the skin overlying the right radial artery.  Access was obtained from the right radial artery utilizing the micropuncture technique and a 5\6 [place] Slender hemostasis sheath was placed without difficulty.  The standard antispasm cocktail as well as 4000 units of unfractionated heparin was administered.  Retrograde catheterization was performed using a 6 Eritrean JR4 diagnostic catheter to engage  the right coronary artery and a 6 Eritrean Tiger diagnostic catheter to engage the left main coronary artery.  Hand injected angiograms was performed.  Contrast ventriculogram was not performed.  Left ventricular and aortic pullback pressures was obtained with the 6 Eritrean JR4 diagnostic catheter.  For the details of the interventional procedure, refer to the dictation below.  Estimated Blood Loss: Negligible Total Contrast: 240 mL Fluoro Time: 10.2-minute Radiation Dose: 1459 mGy (air kerma) Angiographic Findings: · Coronary circulation is right dominant · Left main: The left main coronary artery trifurcates into the left anterior descending ramus intermedius and circumflex coronary arteries.  The left main coronary artery has no significant disease. · LAD: The left anterior descending gives rise to the diagonal branches as well as multiple septal perforators before terminating as an apical recurrent branch.  The left anterior descending has minor luminal irregularities. · LCX: The ramus intermedius branch has minor luminal irregularities.  The circumflex coronary artery is a nondominant vessel giving rise to the obtuse marginal branches.  The circumflex coronary artery and its branches have minor luminal  "irregularities. · RCA: The right coronary artery is a large caliber ectatic vessel.  The vessel demonstrates severe tortuosity with a proximal \"inverted trujillo's crook\" anatomy.  The right coronary artery is dominant for the posterior circulation.  The proximal RCA has been previously stented.  The stent is widely patent without flow-limiting stenosis or evidence of in-stent restenosis.  There was an ostial less than 50% stenosis.  Coronary thrombus was identified just distal to the original RCA stent in an area of severe tortuosity.  In addition, the distal RCA demonstrates a large thrombus burden.  Stenoses in these locations was greater than 90% severity. Hemodynamics: LV pressure: 100/ post a-10 mmHg Ao pressure: 100/70 Intervention Summary Lesion #1 Location:    Distal RCA Stenosis pre-PCI:   95% Stenosis post-PCI:  0% JENNIFER flow pre-PCI:  3 JENNIFER flow post-PCI:  3 ACC AHA class lesion: B OCT-distal RCA Proximal Edge Dissection: No Distal Edge Dissection: No Acceptable Stent Apposition (<3 mm length, >0.3 mm from wall): Yes Acceptable Stent Expansion Index (>0.8): Yes Goal MSA (>4.5 mm^2): Yes Lesion #2 Location:                               Mid RCA Pre-PCI stenosis:                    95% Post-PCI stenosis:                   0% Pre-PCI JENNIFER flow:                   3 Post-PCI JENNIFER flow:                  3 ACC AHA lesion class:             C OCT-mid RCA Proximal Edge Dissection: No Distal Edge Dissection: No Acceptable Stent Apposition (<3 mm length, >0.3 mm from wall): Yes Acceptable Stent Expansion Index (>0.8): Yes Goal MSA (>4.5 mm^2): Yes Lesion #3 Location:                                Proximal RCA Pre-PCI stenosis:                     70% Post-PCI stenosis:                    0% Pre-PCI JENNIFER flow:                    3 Post-PCI JENNIFER flow:                  3 ACC AHA lesion class:             A OCT-proximal RCA Proximal Edge Dissection: No Distal Edge Dissection: No Acceptable Stent Apposition (<3 mm " length, >0.3 mm from wall): Yes Acceptable Stent Expansion Index (>0.8): Yes Goal MSA (>4.5 mm^2): Yes Guide:   6 Iranian AL 0.75 Anticoagulation:  Heparin The patient was given additional 4000 units of unfractionated heparin followed by 1000 units of unfractionated heparin yielding an ACT of 310.  The patient was started on a double Integrilin bolus and drip protocol.  After reengage in the right coronary artery with the guiding catheter, the multiple areas of subtotal occlusion throughout the right coronary artery was crossed with a Run-through guidewire.  Balloon angioplasty was performed in the mid RCA utilizing a 4.0 x 20 mm TREK balloon dilated to 8 cody for 60 seconds.  Balloon angioplasty was then performed in the distal right coronary artery utilizing a 2.5 x 20 mm TREK balloon dilated to 8 cody for 60 seconds.  Stenting was then performed in the distal RCA utilizing a 2.75 x 15 mm Xience drug-eluting stent.  The proximal 2/3 of the stent was further dilated utilizing a 3.5 x 12mm NC TREK balloon dilated to 16 cody for 20 seconds.  OCT was performed which showed no proximal or distal edge dissection.  The stent was completely apposed to the vessel wall and completely expanded with differential sizing.  Next, stenting of the mid RCA was performed utilizing a 4.5 x 23 mm Xience drug-eluting stent dilated to 18 cody for 20 seconds.  This stent overlapped with the original proximal RCA stent.  A final 4.5 x 12 mm Xience drug-eluting stent was deployed proximal to the original stent with overlap.  OCT was performed which showed no evidence of proximal or distal edge dissection.  The stent was completely opposed to the vessel wall and completely expanded.  The angiographic result was excellent throughout. Complications: None apparent     XR Chest 1 View    Result Date: 9/17/2022  Narrative: PORTABLE CHEST  9/17/2022 12:30 PM  HISTORY: Chest pain  COMPARISON:   None  FINDINGS: The cardiac silhouette is normal in size.  The mediastinal and hilar contours are unremarkable.  The lungs are clear. There is no pneumothorax. The visualized osseous structures demonstrate no acute abnormalities.      Impression: No acute cardiopulmonary process.      This report was signed and finalized on 9/17/2022 12:56 PM by Jenifer Peng MD.  Patient was admitted to stroke neurology for possible acute stroke. CT head showed hypodensities concerning for stroke and CTA show right M1 stenosis on admission. TPA was not administered as patient presented outside of the 4.5 hour TPA window. Thrombectomy was not preformed as there was no apparent large vessel occlusion on CTA head and neck. MRI head showed stroke throughout right MCA territory. Echocardiogram was without thrombus or PFO. Stroke labs revealed TSH 18.8, , Hba1c 4.9. Hypercoagulation and vasculitis work up was collected. Conventional angiogram was preformed with results suggestive of drug induced vasculopathy. Patient denies recent use of stimulants. He does take over the counter supplements to augment his weight lifting preformance. We do not know the components of these supplements. Patient was instructed to discontinue use of supplements and clear any new supplements through his PCP. Patient was educated by team on proper nutrition and stroke risk factors. PT/OT evaluated and recommended home with assist and outpatient PT, Neuropsychology consulted for depression and cognitive screening as per below. Follow up with [facility] in 6-8 weeks. NIHSS was recheck prior to discharge and only significant for slight drift in left upper extremity as documented below.     Elevation of TSH and slightly below normal T4 suggest subclinical hypothyroidism, however, since these labs were obtained during acute illness and with patient taking unknown substances it is recommended that patient have this recheck in several weeks by PCP. Additionally, patient reported anxiety throughout stay treated  with vistaril. Relaxation techniques, therapy, and pharmacological therapies were discussed.    DIAGNOSTIC AND PROCEDURAL EVENTS:   -    REQUESTING PHYSICIAN: CHAR RUCKER  REASON FOR EXAMINATION/PROCEDURE: RAD PDP:Y * CVA     EXAMINATION / PROCEDURE:    CTA Head Jan 22 2018 - 21:23; CTA Neck Jan 22 2018 - 21:23;     CLINICAL INDICATION:  CVA.    TECHNIQUE:   Routine contiguousaxial CT images of the head were obtained without   contrast administration.    Contrast-enhanced CT angiogram of the head and neck was obtained after   administration of intravenous iodinated contrast using 0.6 mm axial slice   thickness with multiplanar reformations and maximum intensity   projections. In addition, 3D images were created and reviewed.    Total DLP (Dose-Length Product): 576.56 mGy.cm. Please note: The reported   value represents the total of one or more individual components during   the CT acquisition on this date and at this time, and as such, the same   value may appear in more than one CT report depending on the   interpreting/reporting physicians.    COMPARISON:  Correlation with noncontrast head CT obtained same date at 1353 hours,   [facility].    FINDINGS:  Hypodensity is again noted in the lateral right occipital lobe, adjacent   lower right parietal lobe, and adjacent temporal lobe, consistent with   recent ischemic insult. Hypodensity is also seen in the anterior and   midportion of the right putamen. The CSF spaces remain clear. No midline   shift or attenuation of the basilar cisterns.     CTA Head:  There is normal vascular anatomy. There is no evidence of acute   occlusion, evidence of dissection, or pseudoaneurysm. There appears to be   mild narrowing of the mid right MCA. The intracranial venous structures   are also fairly well opacified and appear unremarkable.    CTA Neck:  There is normal vascular anatomy. There is no evidence of acute  occlusion, dissection, or pseudoaneurysm. There is 0% stenosis  of the ICA   origins by NASCET criteria.     IMPRESSION:   1. Hypodensity seen in the lateral right occipital lobe, adjacent lower   right parietal lobe, adjacent temporal lobe, and right putamen,   consistent with recent ischemic insult, as seen on previous CT earlier in   the day.    2. No evidence of acute extracranial or intracranial large artery   occlusion. There does appear to be mild stenosis of the right MCA.    CRITICAL RESULT:   No.    COMMUNICATION:  Per this written report..     Verified by: OCTAVIANO NEVAREZ M.D. on Jan 22 2018 10:22P  Transcribed by: PSCB on Jan 22 2018 10:22P  Dictated by: OCTAVIANO NEVAREZ M.D. on Jan 22 2018 10:09P    REQUESTING PHYSICIAN: CAMPBELL FLORES  REASON FOR EXAMINATION/PROCEDURE: RAD PDP:Y * Stroke     EXAMINATION / PROCEDURE:    MR Head WO JUNITOON Jan 23 2018 - 02:34;     CLINICAL INDICATION:   Stroke. According to electronic medical record note from this date the   patient's symptoms began approximately one week ago.    TECHNIQUE:  Multiplanar multiecho sequences were performed through the brain   utilizing T1 and T2 weighting, as well as either axial susceptibility   weighted or gradient echo sequences, and axial diffusion weighted images.   Imaging was performed without contrast administration.    COMPARISON:  CTA head and outside hospital CT head 1/22/2018 from [facility].    FINDINGS:  Diagnostic Quality: Adequate.    The ventriclesand sulci are normal in size.    There are areas of T2 hyperintensity and associated restricted diffusion   within the lateral right occipital lobe, right parietal lobe, right   frontal lobe, right temporal lobe, and anterior and midportion of the   right basal ganglia.    There are areas of susceptibility artifact within the right occipital,   right parietal, right frontal, right temporal lobes and right basal   ganglia compatible with microhemorrhage. There are also areas of T1   shortening related to these areas of infarction. There is  mild associated   swelling. There may be a few additional nonspecific white matter lesions.    Vascular Flow Voids: Grossly normal but better evaluated on the CT   angiogram.    Paranasal Sinuses and MastoidAir Cells: Minimal ethmoid sinus mucosal   thickening. The nasal septum is deviated towards the right.    Orbits: No definite masses within the limitations of the study.    Extracranial Findings: None.    Craniocervical Junction and Skull Base: No tonsillar ectopia or mass is   present. Decreased marrow signal within the cervical spine on the   T1-weighted images.     IMPRESSION:  Acute ischemic infarct within the right MCA territory as described above.   There are varying degrees of restricted diffusion within the areas of   infarction as well as areas of microhemorrhages well as petechial   hemorrhage with T1 shortening. This suggests areas of infarction of   varying ages, which would be compatible with the patient's symptoms   beginning approximately a week ago.    Decreased marrow signal within the cervical spine can be due to any   marrow involving processes including anemias or neoplastic involvement.    ADDENDUM: Please note that there has been some editing of the preliminary   report at the time of final study review.    CRITICAL RESULT:  No.    COMMUNICATION:  Per this written report.    By electronically signing this report, I, the attending physician, attest   that I have personally reviewed the images/data for the above  examination(s) and agree with the final edited report.     Verified by: WILLIAM BALLESTEROS M.D. on 2018 9:07A  Transcribed by: [facility] on 2018 2:42A  Dictated by: PANTERA RAINEY M.D. on 2018 2:42A    Study ID: [id number]  Name: AMI DIXON  MRN: [id number]  : 1980 (M/d/yyyy)  +-----------+  +-----------------+ : :  : : [place] :  : : [place] : :  : : [address] [facility] : :  +-----------------+ [place] [address]  +-----------+    +------------------------------------------------------------------------------------  -+  :Name: AMI DIXON Study Date: 2018 10:02 AM BP: 144/103 mmH  g:  :MRN: [id number] Patient Location: [facility]^45^A HR: 66   :  :: 1980 (M/d/yyyy) Gender: Male Height: 69 in   :  :Age: 37 yrs Ethnicity:  Weight: 205 lb   :  :Reason For Study: HTN and Stroke BSA: 2.1 m2   :  :History: HTN   :  :Ordering Physician: Ric DUMONT,   :  :Jayy   :  :Performed By: Suly Bess   :  :   :  +------------------------------------------------------------------------------------  -+    Interpretation Summary  A complete two-dimensional transthoracic echocardiogram was performed (2D, M-mode,  Doppler and color flow Doppler). There is no comparison study available. The rate and  rhythm during this exam was most suggestive of normal sinus at 60-70bpm.    1) Normal LV size with borderline normal LV systolic function (LVEF of 50-55%). No  regional wall motion abnormalities noted.  2) Normal RV size with normal RV systolic function.  3) No significant valvular stenosis or regurgitation.  4) No pericardial effusion.  5) The E/e' ratio is most consistent with 'a normal' left atrial pressure.  6) Intravenous injection of agitated saline demonstrates no evidence of intracardiac  or intrapulmonary shunt.      MMode/2D Measurements & Calculations  IVSd: 1.3 cm LVIDd: 4.8 cm ESV(Teich): 50.3 ml  LVIDs: 3.5 cm  LVPWd: 1.3 cm  _____________________________________________________________________    LV mass(C)d: 246.3 grams LVLd apical: 9.0 cm LVAd sax MV: 15.6 cm2  LVLs apical: 7.2 cm  LVAs sax MV: 10.1 cm2  _____________________________________________________________________  EDV(bullet): 116.8 ml  ESV(bullet): 60.2 ml  EF(bullet): 48.4 %      Doppler Measurements & Calculations  PA acc time: 0.11 sec PA pr(Accel): 31.5 mmHg        Left Ventricle  The left ventricular end-diastolic dimension is normal for men  (4.2-5.4cm). There is  no thrombus. A false chord is noted (normal variant). There is mild concentric left  ventricular hypertrophy. The visually estimated LVEF is '50-55%'. No regional wall  motion abnormalities noted.      Right Ventricle  The right ventricle is normal size. There is normal right ventricular wall thickness.  The right ventricular systolic function is normal. The estimated global right  ventricular systolic function based upon the tricuspid annular plane of systolic  excursion (TAPSE) is 'normal (>20mm)'.      Atria  The left atrial volume index (ml/m2) is 'normal (16-34ml/m2)'. Right atrial size is  normal. Intravenous injection of agitated saline demonstrates no evidence of  intracardiac orintrapulmonary shunt.      Mitral Valve  The mitral valve is normal in appearance. There is no evidence of mitral valve  prolapse. There is no mitral valve stenosis. There is trace mitral regurgitation.      Tricuspid Valve  The tricuspid valve is normal. There is no tricuspid valve prolapse. There is no  tricuspid stenosis. There is trace tricuspid regurgitation. Unable to estimate RVSP  due to inadequate TR signal.      Aortic Valve  The aortic valve appears to be anatomically normal (trilealet). The aortic valve opens  well. The aortic valve annulus is mildly calcified. No hemodynamically significant  valvular aortic stenosis. No aortic regurgitation is present.      Pulmonic Valve  The pulmonic valve is not well seen, but is grosslynormal. There is no pulmonic  valvular stenosis. Trace pulmonic valvular regurgitation.      Great Vessels  The aorta at the Sinuses of Valsalva (inner edge to inner edge method) is 3.3 cm in  diameter. The ascending aorta is 3.4 cm in diameter, at the maximal area visualized.  The pulmonary artery is normal size.      Pericardium/Pleural  There is no pericardial effusion. There is no pleural effusion.      Diastolic LV function  The E/e' ratio is most consistent with 'a normal'  left atrial pressure.    1.  Coronary angiography 7/14/2021              1.  Severe thrombotic stenosis of the proximal to mid RCA                           -Successful PCI with placement of CHRISTOPHE x1 to the proximal to mid RCA              2.  Nonobstructive disease in left coronary system     Pertinent Test Results:   1.  Echocardiogram 7/15/2021              1.  Normal left ventricular size and low normal LV systolic function, LVEF 50-55%              2.  Mild to moderate concentric LVH              3.  No significant valvular abnormalities  Ari is a 42-year-old male who presented with an inferior STEMI, subsequent found to have significant thrombosis in the mid and distal RCA.  Underwent PCI without complication.  He was subsequently found to have significant thrombosis of the proximal RCA distal to the prior stent, as well as in the mid and distal RCA.  He subsequently went PCI to the proximal, mid, and distal RCA with CHRISTOPHE.  The patient tolerated the procedure well without significant periprocedural complications.  On presentation, he was also noted to have significant leukocytosis as well as evidence of suspected polycythemia vera.  Due to concern for possible underlying hypercoagulable disorder, post PCI the patient was started on DAPT with aspirin and Brilinta in addition to Eliquis for anticoagulation    [place], [place] [address]    CLIA# [id number] MR #: [id number]    AMI DIXON    1980 (Age: 37)  MW    Collect Date: 1/24/2018 00:00    Receipt Date: 1/25/2018 08:58    Page 1        DEPARTMENT OF PATHOLOGY    AND LABORATORY MEDICINE    CLINICAL MOLECULAR AND GENOMICS PATHOLOGY    Phone: [phone]     Fax: [phone]    Email: [email or web address]     [id number]    ATTENDING MD: NICANOR Campos Jr, M.D  Service: [facility] Location: A06B OTHER MD(S): ALIVIA Larios MD Client: [facility] Reported: 2/2/2018 07:46    DIRECTOR/SUPERVISOR: SIRENA Castro     Collected: 1/24/2018 00:00     MG, F2 (Prothrombin) / F5 (Factor V Leiden) RTPCR         Interpretation    Analytic Interpretation:  No evidence of coagulation factor 5 allele (F5.0001,    c.1691G>A, p.[id number]; formerly known as Factor V Leiden); no evidence of    coagulation factor 2 allele (F2.0009; c.08912D>A; formerly known as prothrombin    [id number]).     This patient does not have a risk for venous thrombosis due to the most common    genetic causes (F5.0001, c.1691G>A, p.Hod321Try or F2.0009; c.64701X>A).  Clinical    Interpretation:  The patient does not have an increased hereditary risk of venous    thrombosis due to the most common disease-associated F5 or F2 mutations [EVENS Moctezuma,    et al. (1997) ULISSES, 277, 1585-4778; Gerhardt A et al. (2000) N Engl J Med  342:    374-80; CLARA Novak. et al. (2001) Genetics in Medicine 3:,139-48; KAUSHAL Lujan et al.    (2001) Thromb Haemost 86:809-816.]  This test cannot rule out other genetic or    acquired causes of venous thrombosis.          Polymerase chain reaction (PCR) amplification of a region of the coagulation factor 5    gene (F5; HGNC:3542; located at 1q23; formerly known as the factor V gene) and the    coagulation factor 2 gene (F2; HGNC:3535; located at 21a65-h60; formerly known as the    thrombin or prothrombin gene), followed by melting curve analysis with fluorescence    resonance energy transfer (FRET)-labeled oligonucleotide probes [Jennifer et al. (2001)    Am J Clin Pathol 115: 439-47; elmer Beal et al. (1999) Clin Chem 45:694-6].        This test was developed and its performance characteristics determined by the    Clinical Molecular and Genomic Pathology Laboratory at the [facility].     It has not been cleared or approved by the U.S. Food and Drug Administration.  This    test does not require FDA approval.  This test is used for clinical purposes.  It    should not be regarded as investigational or for research.  This laboratory is     certified under the Clinical Laboratory Improvement Amendments of 1988 (CLIA-88) as    qualified to perform high complexity clinical laboratory testing.         This service may have been rendered in part by a resident.  A pathologist has    personally reviewed the test results and has rendered and is responsible for the    diagnosis that appears on the report.                                                                                                                                                                                                    Electronically Signed Out             dsd/2/1/2018 DAY Saha M.D.     CLINICAL INDICATION/HISTORY:    Hypercoag work up    SPECIMEN(S) RECEIVED:    A:  Blood    ICD:   F:F2F5; [id number], [id number]   [employer]         Assessment / Plan      Assessment/Plan:     1. Polycythemia (Primary)  2. Leukocytosis, unspecified type  3.  Recurrent arterial thromboses  4.  Family history of sudden death  The patient has an extensive history of recurrent arterial thromboses in the setting of an elevated hemoglobin and a mild persistent leukocytosis.  He has been labeled as having polycythemia vera in the past and was previously on intermittent phlebotomy.  I have requested copies of this records.  I have reviewed his extensive hypercoagulable work-up which was performed at [facility] in 2018, which does not demonstrate an inherited thrombophilia or evidence of lupus anticoagulant/antiphospholipid antibody syndrome.  Certainly persistent polycythemia vera which is untreated would increase his risk for recurrent arterial thrombosis.  I have recommended that we proceed with molecular testing to evaluate for an underlying myeloproliferative neoplasm and proceed with an intermittent phlebotomy.  He will continue with anticoagulation as per cardiology recommendations.  We discussed that if polycythemia vera is confirmed by history or testing, recommendation will be to continue  with phlebotomy to lower his hemoglobin level and decrease his risk for subsequent thrombosis.  I will follow-up with him in 2 weeks with results.    5.  Depression with recent suicidal ideation  -He is on citalopram and is meeting weekly with a therapist.  He declines any current suicidal ideation and contracts for safety.         Follow Up:   No follow-ups on file.     Linda Breen MD  Hematology and Oncology     Time spent on the day of service was 60 minutes inclusive of time before, during, and after office visit on record review, reviewing outside hematologic work-up and prior discharge summaries, medically appropriate history and physical, counseling patient, ordering tests, documenting in the medical record, and communicating with referring provider.

## 2022-09-28 ENCOUNTER — READMISSION MANAGEMENT (OUTPATIENT)
Dept: CALL CENTER | Facility: HOSPITAL | Age: 42
End: 2022-09-28

## 2022-09-28 LAB
ASSESSMENT OF LEUKOCYTES: NORMAL
CLINICAL INFO: NORMAL
GATING STRATEGY: NORMAL
IMMUNOPHENOTYPING STUDY: NORMAL
LABORATORY COMMENT REPORT: NORMAL
PATH INTERP SPEC-IMP: NORMAL
PATHOLOGIST NAME: NORMAL
REF LAB TEST METHOD: NORMAL
SPECIMEN SOURCE: NORMAL
VIABLE CELLS NFR SPEC: NORMAL %

## 2022-09-28 NOTE — OUTREACH NOTE
AMI Week 2 Survey    Flowsheet Row Responses   [facility]? [facility]   Does the patient have one of the following disease processes/diagnoses(primary or secondary)? Acute MI (STEMI,NSTEMI)   Week 2 attempt successful? No   Unsuccessful attempts Attempt 1          HEATHER REYES - Registered Nurse

## 2022-09-29 ENCOUNTER — READMISSION MANAGEMENT (OUTPATIENT)
Dept: CALL CENTER | Facility: HOSPITAL | Age: 42
End: 2022-09-29

## 2022-09-29 NOTE — OUTREACH NOTE
AMI Week 2 Survey    Flowsheet Row Responses   [facility] from? [facility]   Does the patient have one of the following disease processes/diagnoses(primary or secondary)? Acute MI (STEMI,NSTEMI)   Week 2 attempt successful? No   Unsuccessful attempts Attempt 2          SHREE MODI - Registered Nurse

## 2022-10-04 ENCOUNTER — INFUSION (OUTPATIENT)
Dept: ONCOLOGY | Facility: HOSPITAL | Age: 42
End: 2022-10-04

## 2022-10-04 VITALS
DIASTOLIC BLOOD PRESSURE: 92 MMHG | RESPIRATION RATE: 18 BRPM | OXYGEN SATURATION: 98 % | SYSTOLIC BLOOD PRESSURE: 121 MMHG | TEMPERATURE: 98.2 F | HEART RATE: 82 BPM

## 2022-10-04 DIAGNOSIS — D72.829 LEUKOCYTOSIS, UNSPECIFIED TYPE: Primary | ICD-10-CM

## 2022-10-04 DIAGNOSIS — D75.1 POLYCYTHEMIA: ICD-10-CM

## 2022-10-04 LAB
FERRITIN SERPL-MCNC: 105.7 NG/ML (ref 30–400)
HCT VFR BLD AUTO: 49.7 % (ref 37.5–51)

## 2022-10-04 PROCEDURE — 85014 HEMATOCRIT: CPT

## 2022-10-04 PROCEDURE — 82728 ASSAY OF FERRITIN: CPT

## 2022-10-04 PROCEDURE — 36415 COLL VENOUS BLD VENIPUNCTURE: CPT

## 2022-10-04 PROCEDURE — 99195 PHLEBOTOMY: CPT

## 2022-10-05 ENCOUNTER — OFFICE VISIT (OUTPATIENT)
Dept: FAMILY MEDICINE CLINIC | Facility: OTHER | Age: 42
End: 2022-10-05

## 2022-10-05 ENCOUNTER — READMISSION MANAGEMENT (OUTPATIENT)
Dept: CALL CENTER | Facility: HOSPITAL | Age: 42
End: 2022-10-05

## 2022-10-05 VITALS
DIASTOLIC BLOOD PRESSURE: 70 MMHG | SYSTOLIC BLOOD PRESSURE: 112 MMHG | BODY MASS INDEX: 26.36 KG/M2 | OXYGEN SATURATION: 99 % | WEIGHT: 178 LBS | HEART RATE: 82 BPM | TEMPERATURE: 97.3 F | HEIGHT: 69 IN

## 2022-10-05 DIAGNOSIS — I25.10 CORONARY ARTERY DISEASE INVOLVING NATIVE CORONARY ARTERY OF NATIVE HEART WITHOUT ANGINA PECTORIS: ICD-10-CM

## 2022-10-05 DIAGNOSIS — R11.0 NAUSEA: ICD-10-CM

## 2022-10-05 DIAGNOSIS — E78.00 PURE HYPERCHOLESTEROLEMIA: Primary | ICD-10-CM

## 2022-10-05 DIAGNOSIS — G47.00 INSOMNIA, UNSPECIFIED TYPE: ICD-10-CM

## 2022-10-05 DIAGNOSIS — I10 ESSENTIAL HYPERTENSION: ICD-10-CM

## 2022-10-05 DIAGNOSIS — D75.1 POLYCYTHEMIA: ICD-10-CM

## 2022-10-05 DIAGNOSIS — R11.2 NAUSEA AND VOMITING, UNSPECIFIED VOMITING TYPE: ICD-10-CM

## 2022-10-05 PROCEDURE — 99214 OFFICE O/P EST MOD 30 MIN: CPT | Performed by: NURSE PRACTITIONER

## 2022-10-05 RX ORDER — ONDANSETRON 4 MG/1
4 TABLET, ORALLY DISINTEGRATING ORAL EVERY 8 HOURS PRN
Qty: 30 TABLET | Refills: 2 | Status: SHIPPED | OUTPATIENT
Start: 2022-10-05

## 2022-10-05 RX ORDER — RISPERIDONE 1 MG/1
1 TABLET ORAL NIGHTLY
Qty: 30 TABLET | Refills: 2 | Status: SHIPPED | OUTPATIENT
Start: 2022-10-05

## 2022-10-05 RX ORDER — FOLIC ACID 1 MG/1
1000 TABLET ORAL DAILY
Qty: 30 TABLET | Refills: 2 | Status: SHIPPED | OUTPATIENT
Start: 2022-10-05

## 2022-10-05 RX ORDER — VENLAFAXINE HYDROCHLORIDE 37.5 MG/1
TABLET, EXTENDED RELEASE ORAL
COMMUNITY
Start: 2022-10-04

## 2022-10-05 NOTE — OUTREACH NOTE
"AMI Week 3 Survey    Flowsheet Row Responses   [facility] patient discharged from? [facility]   Does the patient have one of the following disease processes/diagnoses(primary or secondary)? Acute MI (STEMI,NSTEMI)   Week 3 attempt successful? Yes   Call start time 1549   Call end time 1553   Discharge diagnosis STEMI   Person spoke with today (if not patient) and relationship patient   Meds reviewed with patient/caregiver? Yes   Is the patient having any side effects they believe may be caused by any medication additions or changes? No   Does the patient have all prescriptions related to this admission filled (includes statins,anticoagulants,HTN meds,anti-arrhythmia meds) Yes   Is the patient taking all medications as directed (includes completed medication regime)? Yes   Medication comments Pt recently had sleeping medication prescribed and \"stomething for my stomach\".  Also switched antidepressants   Does the patient have a primary care provider?  Yes   Does the patient have an appointment with their PCP,cardiologist,or clinic within 7 days of discharge? Yes   Comments regarding PCP 9/22/22   Has the patient kept scheduled appointments due by today? Yes   Comments Cardiologist 10/19/22   Psychosocial issues? No   Did the patient receive a copy of their discharge instructions? Yes   Nursing interventions Reviewed instructions with patient   What is the patient's perception of their health status since discharge? Improving   Is the patient/caregiver able to teach back ways to prevent a second heart attack: Take medications, Follow up with MD, Manage risk factors   If the patient is a current smoker, are they able to teach back resources for cessation? Not a smoker   Is the patient/caregiver able to teach back the hierarchy of who to call/visit for symptoms/problems? PCP, Specialist, Home health nurse, Urgent Care, ED, 911 Yes   Additional teach back comments Pt will see phsychiatry, he does have    Week 3 call " completed? Yes   Wrap up additional comments Pt is improving.  He has appts. made and has had some medication adjustments.  He has not been sleeping well and is feeling more positive regarding outlook with some medication changes.  Denies needs at this time.           RANDI WRIGHT - Registered Nurse

## 2022-10-05 NOTE — PROGRESS NOTES
Established Patient        Chief Complaint: No chief complaint on file.        History of Present Illness:    Girma Chapman is a 42 y.o. male who presents today for complaints of nausea. Patient states that he has been having nausea and thinks that it was triggered by SSRI. Patient was on Zoloft but did not tolerate it well. Was switched to Celexa and still reported nausea. Now on Effexor 37.5mg daily. States that he just started it yesterday. Patient has a history of MI and stroke. Patient had another MI 2 weeks ago.     Subjective     The following portions of the patient's history were reviewed and updated as appropriate: allergies, current medications, past family history, past medical history, past social history, past surgical history and problem list.    ALLERGIES  No Known Allergies    ROS  Review of Systems  1. Constitutional: Negative for fever. Negative for chills, diaphoresis, fatigue and unexpected weight change.   2. HENT: No dysphagia; no changes to vision/hearing/smell/taste; no epistaxis  3. Eyes: Negative for redness and visual disturbance.   4. Respiratory: negative for shortness of breath. Negative for chest pain . Negative for cough and chest tightness.   5. Cardiovascular: Negative for chest pain and palpitations.   6. Gastrointestinal: Negative for abdominal distention, abdominal pain and blood in stool.   7. Endocrine: Negative for cold intolerance and heat intolerance.   8. Genitourinary: Negative for difficulty urinating, dysuria and frequency.   9. Musculoskeletal: Negative for arthralgias, back pain and myalgias.   10. Skin: Negative for color change, rash and wound.   11. Neurological: Negative for syncope, weakness and headaches.   12. Hematological: Negative for adenopathy. Does not bruise/bleed easily.   13. Psychiatric/Behavioral: Negative for confusion. The patient is not nervous/anxious.    Objective     Vital Signs:   /70   Pulse 82   Temp 97.3 °F (36.3  "°C) (Infrared)   Ht 175.3 cm (69\")   Wt 80.7 kg (178 lb)   SpO2 99%   BMI 26.29 kg/m²     Physical Exam   Physical Exam  General Appearance: alert, oriented x 3, no acute distress.  Skin: warm and dry.   HEENT: Atraumatic.  pupils round and reactive to light and accommodation, oral mucosa pink and moist.  Nares patent without epistaxis.  External auditory canals are patent tympanic membranes intact.  Neck: supple, no JVD, trachea midline.  No thyromegaly  Lungs: CTA, unlabored breathing effort.  Heart: RRR, normal S1 and S2, no S3, no rub.  Abdomen: soft, non-tender, no palpable bladder, present bowel sounds to auscultation ×4.  No guarding or rigidity.  Extremities: no clubbing, cyanosis or edema.  Good range of motion actively and passively.  Symmetric muscle strength and development  Neuro: normal speech and mental status.  Cranial nerves II through XII intact.  No anosmia. DTR 2+; proprioception intact.  No focal motor/sensory deficits.  Lab Results   Component Value Date    WBC 9.00 09/26/2022    HGB 17.3 09/26/2022    HCT 49.7 10/04/2022    MCV 93.3 09/26/2022     09/26/2022     Lab Results   Component Value Date    GLUCOSE 91 09/19/2022    BUN 12 09/19/2022    CREATININE 1.05 09/19/2022    EGFRIFNONA 70 07/16/2021    BCR 11.4 09/19/2022    K 3.8 09/19/2022    CO2 28.5 09/19/2022    CALCIUM 9.1 09/19/2022    ALBUMIN 3.10 (L) 09/19/2022    AST 50 (H) 09/19/2022    ALT 23 09/19/2022     Lab Results   Component Value Date    CHOL 235 (H) 09/18/2022    TRIG 61 09/18/2022    HDL 51 09/18/2022     (H) 09/18/2022     Adult Transthoracic Echo Complete W/ Cont if Necessary Per Protocol  1.  Normal left ventricular size and systolic function, LVEF 55-60%.  2.  Mild hypokinesis of the inferior wall.  3.  Normal LV diastolic filling pattern.  4.  Normal right ventricular size and systolic function.  5.  Mild to moderate concentric LVH.  6.  Normal left atrial volume index.  7.  No significant valvular " "abnormalities.  Cardiac Catheterization/Vascular Study  Recommendations:  · Enteric-coated aspirin indefinitely  · Uninterrupted dual antiplatelet therapy (enteric-coated baby aspirin   plus Brilinta) for minimum of 30 months.  Consideration given to   indefinite dual antiplatelet therapy  · Initiation of anticoagulation therapy with Eliquis 5 mg orally twice   daily (\"off-label\" use) for recurrent arterial thrombosis (coronary and   cerebral).  Hypercoagulable state suspected  · Outpatient referral to hematology for hypercoagulable state work-up  · Outpatient referral to cardiac rehab  · Counseling regarding essential need to maintain compliance with   medications  · Echocardiogram in a.m. to evaluate LV systolic function post-ACS    Impression:    Severe single-vessel coronary artery disease with multiple sites of plaque   rupture and coronary thrombosis within the RCA  No evidence of in-stent restenosis  Successful catheter-based revascularization of the proximal mid and distal   RCA with placement of multiple drug-eluting stents  Stent deployment optimization with OCT  Suspected hypercoagulable state leading to multiple episodes of arterial   thrombosis.  No venous thrombosis history    Indication:    Acute inferior ST elevation myocardial infarction    ---------------------------------------------------------------------------  -------------------------------------------    Clinical History: This is a 42-year-old male patient with known coronary   artery disease and suspected hypercoagulable state with prior cerebral and   coronary thrombosis who presents to the emergency room with acute onset of   severe anterior chest pressure and heaviness.  Initial twelve-lead   electrocardiogram showed pathologic Q waves in the inferior leads with   subtle J-point elevation but no highly suspicious findings for ST   elevation myocardial infarction.  The patient had escalation of chest   discomfort and repeat twelve-lead " electrocardiogram showed evidence of   inferior ST elevation myocardial infarction.    Procedures performed:  1. Bilateral selective coronary angiography  2. Left heart catheterization  3. Balloon angioplasty x2-RCA  4. Drug-eluting stent placement x3-RCA  5. OCT x2-RCA     Access:   5\6 Turkish Slender arterial hemostasis sheath placed via right   radial artery; arterial sheath removed in the cardiac catheterization   laboratory with application of a transradial band for patent hemostasis.    Procedure narrative:  The procedure was explained in detail to the patient, including risks   benefits and alternatives.  The patient expressed understanding and a   desire to proceed. Delfino's testing demonstrated excellent collateral   circulation to both hands.  The patient was brought to the cardiac   catheterization laboratory where the right wrist was prepped and draped in   usual sterile fashion.  One percent lidocaine was infiltrated into the   skin overlying the right radial artery.  Access was obtained from the   right radial artery utilizing the micropuncture technique and a 5\6 Turkish   Slender hemostasis sheath was placed without difficulty.  The standard   antispasm cocktail as well as 4000 units of unfractionated heparin was   administered.  Retrograde catheterization was performed using a 6 Turkish   JR4 diagnostic catheter to engage  the right coronary artery and a 6   Turkish Tiger diagnostic catheter to engage the left main coronary artery.    Hand injected angiograms was performed.  Contrast ventriculogram was not   performed.  Left ventricular and aortic pullback pressures was obtained   with the 6 Turkish JR4 diagnostic catheter.  For the details of the   interventional procedure, refer to the dictation below.     Estimated Blood Loss:  Negligible  Total Contrast:  240 mL  Fluoro Time:  10.2-minute  Radiation Dose:  1459 mGy (air kerma)  Angiographic Findings:  · Coronary circulation is right dominant  · Left  "main: The left main coronary artery trifurcates into the left   anterior descending ramus intermedius and circumflex coronary arteries.    The left main coronary artery has no significant disease.  · LAD: The left anterior descending gives rise to the diagonal branches as   well as multiple septal perforators before terminating as an apical   recurrent branch.  The left anterior descending has minor luminal   irregularities.  · LCX: The ramus intermedius branch has minor luminal irregularities.  The   circumflex coronary artery is a nondominant vessel giving rise to the   obtuse marginal branches.  The circumflex coronary artery and its branches   have minor luminal irregularities.  · RCA: The right coronary artery is a large caliber ectatic vessel.  The   vessel demonstrates severe tortuosity with a proximal \"inverted trujillo's   crook\" anatomy.  The right coronary artery is dominant for the posterior   circulation.  The proximal RCA has been previously stented.  The stent is   widely patent without flow-limiting stenosis or evidence of in-stent   restenosis.  There was an ostial less than 50% stenosis.  Coronary   thrombus was identified just distal to the original RCA stent in an area   of severe tortuosity.  In addition, the distal RCA demonstrates a large   thrombus burden.  Stenoses in these locations was greater than 90%   severity.    Hemodynamics:  LV pressure: 100/ post a-10 mmHg  Ao pressure: 100/70    Intervention Summary    Lesion #1   Location:    Distal RCA  Stenosis pre-PCI:   95%  Stenosis post-PCI:  0%  JENNIFER flow pre-PCI:  3  JENNIFER flow post-PCI:  3  ACC AHA class lesion: B    OCT-distal RCA    Proximal Edge Dissection: No    Distal Edge Dissection: No    Acceptable Stent Apposition (<3 mm length, >0.3 mm from wall): Yes    Acceptable Stent Expansion Index (>0.8): Yes    Goal MSA (>4.5 mm^2): Yes    Lesion #2  Location:                               Mid RCA  Pre-PCI stenosis:                    " 95%  Post-PCI stenosis:                   0%  Pre-PCI JENNIFER flow:                   3  Post-PCI JENNIFER flow:                  3  ACC AHA lesion class:             C     OCT-mid RCA    Proximal Edge Dissection: No    Distal Edge Dissection: No    Acceptable Stent Apposition (<3 mm length, >0.3 mm from wall): Yes    Acceptable Stent Expansion Index (>0.8): Yes    Goal MSA (>4.5 mm^2): Yes    Lesion #3  Location:                                Proximal RCA  Pre-PCI stenosis:                     70%  Post-PCI stenosis:                    0%  Pre-PCI JENNIFER flow:                    3  Post-PCI JENNIFER flow:                  3  ACC AHA lesion class:             A     OCT-proximal RCA    Proximal Edge Dissection: No    Distal Edge Dissection: No    Acceptable Stent Apposition (<3 mm length, >0.3 mm from wall): Yes    Acceptable Stent Expansion Index (>0.8): Yes    Goal MSA (>4.5 mm^2): Yes    Guide:   6 [place] AL 0.75  Anticoagulation:  Heparin    The patient was given additional 4000 units of unfractionated heparin   followed by 1000 units of unfractionated heparin yielding an ACT of 310.    The patient was started on a double Integrilin bolus and drip protocol.    After reengage in the right coronary artery with the guiding catheter, the   multiple areas of subtotal occlusion throughout the right coronary artery   was crossed with a Run-through guidewire.  Balloon angioplasty was   performed in the mid RCA utilizing a 4.0 x 20 mm TREK balloon dilated to 8   cody for 60 seconds.  Balloon angioplasty was then performed in the distal   right coronary artery utilizing a 2.5 x 20 mm TREK balloon dilated to 8   cody for 60 seconds.  Stenting was then performed in the distal RCA   utilizing a 2.75 x 15 mm Xience drug-eluting stent.  The proximal 2/3 of   the stent was further dilated utilizing a 3.5 x 12mm NC TREK balloon   dilated to 16 cody for 20 seconds.  OCT was performed which showed no   proximal or distal edge dissection.  The  stent was completely apposed to   the vessel wall and completely expanded with differential sizing.  Next,   stenting of the mid RCA was performed utilizing a 4.5 x 23 mm Xience   drug-eluting stent dilated to 18 cody for 20 seconds.  This stent   overlapped with the original proximal RCA stent.  A final 4.5 x 12 mm   Xience drug-eluting stent was deployed proximal to the original stent with   overlap.  OCT was performed which showed no evidence of proximal or distal   edge dissection.  The stent was completely opposed to the vessel wall and   completely expanded.  The angiographic result was excellent throughout.    Complications: None apparent      Assessment and Plan      Assessment/Plan:   Diagnoses and all orders for this visit:    1. Pure hypercholesterolemia (Primary)    2. Essential hypertension    3. Coronary artery disease involving native coronary artery of native heart without angina pectoris    4. Nausea and vomiting, unspecified vomiting type    5. Polycythemia  -     folic acid (FOLVITE) 1 MG tablet; Take 1 tablet by mouth Daily.  Dispense: 30 tablet; Refill: 2    6. Insomnia, unspecified type  -     risperiDONE (RisperDAL) 1 MG tablet; Take 1 tablet by mouth Every Night.  Dispense: 30 tablet; Refill: 2    7. Nausea  -     ondansetron ODT (ZOFRAN-ODT) 4 MG disintegrating tablet; Place 1 tablet on the tongue Every 8 (Eight) Hours As Needed for Nausea or Vomiting.  Dispense: 30 tablet; Refill: 2    --patient has follow-up appointment with Hematology/Oncology on 10/10/2022    Discussion Summary:  Discussed plan of care in detail with pt today; pt verb understanding and agrees.    Follow up:  Return if symptoms worsen or fail to improve.     Patient Education:  There are no Patient Instructions on file for this visit.    MARY CARMEN Rosas  10/05/22  13:06 EDT          Please note that portions of this note may have been completed with a voice recognition program. Efforts were made to edit the  dictations, but occasionally words are mistranscribed.

## 2022-10-07 LAB
INTERPRETATION: NEGATIVE
LAB DIRECTOR NAME PROVIDER: NORMAL
LABORATORY COMMENT REPORT: NORMAL
REF LAB TEST METHOD: NORMAL
T(ABL1,BCR)B2A2/CONTROL BLD/T: NORMAL %
T(ABL1,BCR)B3A2/CONTROL BLD/T: NORMAL %
T(ABL1,BCR)E1A2/CONTROL BLD/T: NORMAL %

## 2022-10-10 ENCOUNTER — OFFICE VISIT (OUTPATIENT)
Dept: ONCOLOGY | Facility: CLINIC | Age: 42
End: 2022-10-10

## 2022-10-10 ENCOUNTER — APPOINTMENT (OUTPATIENT)
Dept: ONCOLOGY | Facility: HOSPITAL | Age: 42
End: 2022-10-10

## 2022-10-10 VITALS
TEMPERATURE: 97.3 F | RESPIRATION RATE: 16 BRPM | WEIGHT: 177 LBS | BODY MASS INDEX: 26.22 KG/M2 | SYSTOLIC BLOOD PRESSURE: 127 MMHG | DIASTOLIC BLOOD PRESSURE: 79 MMHG | HEIGHT: 69 IN | HEART RATE: 92 BPM | OXYGEN SATURATION: 99 %

## 2022-10-10 DIAGNOSIS — D72.829 LEUKOCYTOSIS, UNSPECIFIED TYPE: Primary | ICD-10-CM

## 2022-10-10 DIAGNOSIS — D75.1 POLYCYTHEMIA: ICD-10-CM

## 2022-10-10 LAB
CALR EXON 9 MUT ANL BLD/T: NORMAL
CITATION REF LAB TEST: NORMAL
JAK2 GENE MUT ANL BLD/T: NORMAL
JAK2 P.V617F BLD/T QL: NORMAL
LAB DIRECTOR NAME PROVIDER: NORMAL
LABORATORY COMMENT REPORT: NORMAL
LABORATORY COMMENT REPORT: NORMAL
Lab: NORMAL
MPL GENE MUT TESTED BLD/T: NORMAL
MPL P.W515L+W515K+S505N BLD/T QL: NORMAL
REF LAB TEST METHOD: NORMAL
REF LAB TEST METHOD: NORMAL
REFLEX: NORMAL
SERVICE CMNT-IMP: NORMAL
SPECIMEN PREPARATION: NORMAL
SPECIMEN PREPARATION: NORMAL

## 2022-10-10 PROCEDURE — 99214 OFFICE O/P EST MOD 30 MIN: CPT | Performed by: INTERNAL MEDICINE

## 2022-10-10 NOTE — PROGRESS NOTES
[facility]ology [facility]  Office number [phone]    Fax number [phone]    Follow up     Date: 10/14/22    Patient Name: Girma Chapman  MRN: [id number]  : 1980    Referring Physician: Dr. Farhad MIGUEL    Chief Complaint: Follow up polycythemia    History of Present Illness: Girma Chapman is a pleasant 42 y.o. male who presents today for evaluation of polycythemia, CVA, and recurrent myocardial infarction.    He initially presented with asymptomatic right MCA territory stroke in 2018.  He was admitted to [facility].  Echocardiogram showed no thrombus or PFO.  His TSH was noted to be 18 with a mildly depressed T4 suggestive of subclinical hypothyroidism.  Hypercoagulable work-up was obtained.  Angiogram reportedly was suggestive of drug-induced vasculopathy, but without any history of illicit drug use, this was attributed possibly to being on over-the-counter supplements to augment his bodybuilding. polycythemia a symptomatic right MCA territory factor V Leiden and prothrombin gene mutation negative Antithrombin III normal protein S and protein C normal lupus anticoagulant negative anticardiolipin antibodies normal beta-2 glycoprotein antibodies negative factor VIII activity normal.  CBC from that admission was notable for a WBC count of 9.7; hemoglobin of 19; hematocrit of 54; and platelet count of 311.    He was subsequently evaluated by Dr. Jb Mcginnis, a hematologist at [facility] and was diagnosed with polycythemia vera.  He does not recall having a bone marrow biopsy.  He does recall being on phlebotomy every couple weeks for approximately 2 years and discontinued this in .    In 2021 he suffered a ST elevation myocardial infarction with coronary angiogram from 2021 showing severe thrombotic stenosis of the proximal to mid RCA.  He underwent drug-eluting stent placement to the mid RCA and was started on aspirin and Plavix.  He completed 1 year  of dual antiplatelet therapy and discontinued both aspirin and Plavix around August 2022.    He was admitted in September 2022 with an inferior ST elevation MI and found to have significant thrombosis in the mid and distal RCA.  He underwent stenting and was discharged on aspirin, Brilinta, and Eliquis.  CBC was notable for a WBC count of 23 with elevated monocytes and eosinophils as well as an absolute leukocyte count of 4.2 and ANC of 16; his hemoglobin has ranged from 17-19 and his platelet count was normal.    He endorses substantial depression for which she is on citalopram.  He endorsed suicidal ideation approximately a week ago and was evaluated at [facility].  He is now established with a counselor and denies any suicidal ideation.  He says he is feeling hopeful for the first time in a few years.  He is frustrated by his physical limitations as he was previously a , and now feels like he is unable to maintain the work activity because of his recurrent thrombotic episodes.  He endorses poor appetite, night sweats, and fatigue.    Treatment history:  Intermittent phlebotomy 9/2022 to present    Interval history:   Presents for follow-up.  He is feeling much better since the phlebotomy.  He was able to run from the [employer] to the office today.  He is still working closely with psychiatry with regard to his depression. Citalopram was recently changed to venlafaxine and risperdal.  He feels it is too early to see the effect of that.    I was able to obtain his records from his previous hematologist.  There was no prior testing for JAK2 mutations and no prior bone marrow biopsy.  His previous polycythemia was attributed to supplement use.     Since his last visit he underwent BCR able testing which was negative.  JAK2 V6 17F mutation was negative.  ALICE R, JAK2 exon 12 through 15 was also negative.    He provides additional history that he had been on testosterone supplements while  followed by Dr. Mcginnis.  Ultimately he switched to a regimen of Belkis/ashweganda/zinc and this maintained his energy so he is continued this and is currently taking that regimen.      Past Medical History:   Past Medical History:   Diagnosis Date   • Anxiety    • Depression    • Glaucoma    • Hypertension    • Screening for neurological condition    • Seizures (HCC)    • Self-injurious behavior    • Stroke (HCC)        Past Surgical History:   Past Surgical History:   Procedure Laterality Date   • CARDIAC CATHETERIZATION N/A 7/14/2021    Procedure: CORONARY ANGIOGRAPHY;  Surgeon: Farhad Grady MD;  Location: [facility] CATH INVASIVE LOCATION;  Service: Cardiology;  Laterality: N/A;   • CARDIAC CATHETERIZATION N/A 7/14/2021    Procedure: Percutaneous Coronary Intervention;  Surgeon: Farhad Grady MD;  Location: [facility] CATH INVASIVE LOCATION;  Service: Cardiology;  Laterality: N/A;   • CARDIAC CATHETERIZATION N/A 7/14/2021    Procedure: Optical Coherent Tomography;  Surgeon: Farhad Grady MD;  Location: [facility] CATH INVASIVE LOCATION;  Service: Cardiology;  Laterality: N/A;   • CARDIAC CATHETERIZATION N/A 9/17/2022    Procedure: Left Heart Cath;  Surgeon: René Solomon MD;  Location: [facility] CATH INVASIVE LOCATION;  Service: Cardiology;  Laterality: N/A;   • CARDIAC CATHETERIZATION N/A 9/17/2022    Procedure: Percutaneous Coronary Intervention;  Surgeon: René Solomon MD;  Location: [facility] CATH INVASIVE LOCATION;  Service: Cardiology;  Laterality: N/A;   • FRACTURE SURGERY Right     ankle       Family History:   Family History   Problem Relation Age of Onset   • Alcohol abuse Mother    • Anxiety disorder Mother    • Alcohol abuse Father    • Drug abuse Father    • Seizures Father    • Heart attack Maternal Uncle    • Dementia Maternal Grandmother    • Anxiety disorder Maternal Grandmother    • OCD Maternal Grandfather    • Drug abuse Paternal Grandmother    • Drug abuse Paternal Grandfather    •  Schizophrenia Cousin    • ADD / ADHD Neg Hx    • Bipolar disorder Neg Hx    • Paranoid behavior Neg Hx    • Self-Injurious Behavior  Neg Hx    • Suicide Attempts Neg Hx        Social History:   Social History     Socioeconomic History   • Marital status: Single   Tobacco Use   • Smoking status: Former     Types: Cigarettes     Quit date:      Years since quittin.7   • Smokeless tobacco: Former     Types: Snuff     Quit date:    Vaping Use   • Vaping Use: Never used   Substance and Sexual Activity   • Alcohol use: Yes     Comment: OCC/ once a month   • Drug use: Yes     Types: Marijuana     Comment: RARELY   • Sexual activity: Defer       Medications:     Current Outpatient Medications:   •  apixaban (ELIQUIS) 5 MG tablet tablet, Take 1 tablet by mouth Every 12 (Twelve) Hours. Indications: Other - full anticoagulation, Disp: 180 tablet, Rfl: 3  •  aspirin 81 MG EC tablet, Take 1 tablet by mouth Daily., Disp: 180 tablet, Rfl: 3  •  atorvastatin (LIPITOR) 80 MG tablet, Take 1 tablet by mouth Every Night., Disp: 90 tablet, Rfl: 3  •  carvedilol (COREG) 12.5 MG tablet, Take 1 tablet by mouth 2 (Two) Times a Day With Meals., Disp: 180 tablet, Rfl: 3  •  dicyclomine (BENTYL) 20 MG tablet, Take 1 tablet by mouth Every 6 (Six) Hours As Needed (Abdominal cramping)., Disp: 12 tablet, Rfl: 0  •  folic acid (FOLVITE) 1 MG tablet, Take 1 tablet by mouth Daily., Disp: 30 tablet, Rfl: 2  •  hydrOXYzine (ATARAX) 10 MG tablet, 10 mg Every Night. PT STATES 20MG AT NIGHT, Disp: , Rfl:   •  lisinopril (PRINIVIL,ZESTRIL) 10 MG tablet, Take 1 tablet by mouth Daily., Disp: 90 tablet, Rfl: 3  •  ondansetron ODT (ZOFRAN-ODT) 4 MG disintegrating tablet, Place 1 tablet on the tongue Every 8 (Eight) Hours As Needed for Nausea or Vomiting., Disp: 30 tablet, Rfl: 2  •  risperiDONE (RisperDAL) 1 MG tablet, Take 1 tablet by mouth Every Night., Disp: 30 tablet, Rfl: 2  •  thyroid (ARMOUR) 30 MG tablet, Take 1 tablet by mouth Daily.,  "Disp: , Rfl:   •  ticagrelor (BRILINTA) 90 MG tablet tablet, Take 1 tablet by mouth 2 (Two) Times a Day., Disp: 60 tablet, Rfl: 11  •  venlafaxine 37.5 MG tablet sustained-release 24 hour 24 hr tablet, , Disp: , Rfl:     Allergies:   No Known Allergies    Objective     Vital Signs:   Vitals:    10/10/22 1404   BP: 127/79   Pulse: 92   Resp: 16   Temp: 97.3 °F (36.3 °C)   TempSrc: Temporal   SpO2: 99%   Weight: 80.3 kg (177 lb)   Height: 175.3 cm (69\")   PainSc: 0-No pain    Body mass index is 26.14 kg/m².   Pain Score    10/10/22 1404   PainSc: 0-No pain       Physical Exam:   General: No acute distress. Well appearing   HEENT: Normocephalic, atraumatic. Sclera anicteric. Masked.  Neck: supple, no adenopathy.   Cardiovascular: regular rate and rhythm,. No murmurs.   Respiratory: Normal rate. Clear to auscultation bilaterally.  Abdomen: Soft, nontender, non distended with normoactive bowel sounds. No hepatosplenomegaly  Lymph: no cervical, supraclavicular or axillary adenopathy.  Neuro: Alert and oriented x 3. No focal deficits.   Ext: Symmetric, no swelling.   Psych: Euthymic      Laboratory/Imaging Reviewed:    Result Notes  Component   Ref Range & Units 2 wk ago    e13a2 (b2a2) Transcript   % Comment    Comment:            <0.0032 %   e14a2 (b3a2) Transcript   % Comment    Comment:            <0.0032 %   E1A2 transcript   % Comment    Comment:            <0.0032 %   Interpretation  Negative    Comment: NEGATIVE for the BCR-ABL1 e1a2 (p190), e13a2 (b2a2, p210) and e14a2   (b3a2, p210) fusion transcripts. These results do not rule out the   presence of rare BCR-ABL1 transcripts not detected by this assay.   Director Review  Comment    Comment: Janessa Petersen, PhD, [facility]                  Director, Molecular Genetics                  [facility]                  Biology and Pathology                  [place], [place]                  [phone]   Background:  Comment    Comment: This assay can " detect three different types of BCR-ABL1 fusion   transcripts associated with CML, ALL, and AML: e13a2 (previously   b2a2) and e14a2 (previously b3a2) (major breakpoint, p210), as   well as e1a2 (minor breakpoint, p190). The e13a2 and e14a2 transcript   values are titrated to the current International Scale (IS). The   standardized baseline is 100% BCR-ABL1 (IS) and major molecular   response (MMR) is equivalent to 0.1% BCR-ABL1 (IS) corresponding   to a 3-log reduction. Results should be correlated with appropriate   clinical and laboratory information as indicated.   Methodology:  Comment    Comment: Total RNA is isolated from the sample and subject to a real-time,   reverse transcriptase polymerase chain reaction (RT-PCR). The PCR   primers and probes are specific for BCR-ABL1 e13a2, e14a2 and e1a2   fusion transcripts. The ABL1 transcript is amplified as the control   for cDNA quantity and quality. Serial dilutions of a validated   positive control RNA with known t(9;22) BCR-ABL1 are used as   reference for quantification of BCR-ABL1 relative to ABL1. The   numeric BCR-ABL1 level is reported as % BCR-ABL1/ABL1 and the   detection sensitivity is 4.5 log below the standard baseline   (<0.0032%).   This test was developed and its performance characteristics determined   by [employer]. It has not been cleared or approved by the Food and Drug   Administration.          Component      Latest Ref Rng & Units 9/19/2022 9/23/2022   Glucose      65 - 99 mg/dL 91    BUN      6 - 20 mg/dL 12    Creatinine      0.76 - 1.27 mg/dL 1.05    Sodium      136 - 145 mmol/L 133 (L)    Potassium      3.5 - 5.2 mmol/L 3.8    Chloride      98 - 107 mmol/L 97 (L)    CO2      22.0 - 29.0 mmol/L 28.5    Calcium      8.6 - 10.5 mg/dL 9.1    Total Protein      6.0 - 8.5 g/dL 7.1    Albumin      3.50 - 5.20 g/dL 3.10 (L)    ALT (SGPT)      1 - 41 U/L 23    AST (SGOT)      1 - 40 U/L 50 (H)    Alkaline Phosphatase      39 - 117 U/L 73    Total  "Bilirubin      0.0 - 1.2 mg/dL 0.8    Globulin      gm/dL 4.0    A/G Ratio      g/dL 0.8    BUN/Creatinine Ratio      7.0 - 25.0 11.4    Anion Gap      5.0 - 15.0 mmol/L 7.5    eGFR      >60.0 mL/min/1.73 92.0    WBC      3.70 - 10.30 10*3/uL 23.22 (H) 18.71 (H)   RBC      4.60 - 6.10 10*6/uL 5.80 5.70   Hemoglobin      13.7 - 17.5 g/dL 18.2 (H) 17.8 (H)   Hematocrit      40.0 - 51.0 % 52.4 (H) 52.7 (H)   MCV      79 - 98 fL 90.3 93   MCH      26.0 - 32.0 pg 31.4 31.2   MCHC      30.7 - 35.5 g/dL 34.7 33.8   RDW      11.5 - 14.5 % 14.1 13.5   RDW-SD      37.0 - 54.0 fl 46.5    MPV      8.8 - 12.5 fL 9.5 9.3   Platelets      155 - 369 10*3/uL 403 370 (H)   nRBC      <=0.0 per 100 WBCs  0.0   HIV Screen 4th Gen w/RFX (Reference)      Nonreactive  Nonreactive   HCV Qual      Negative  Negative   Ethanol      <10 mg/dL  <10   TSH Pregnancy      0.40 - 4.20 uIU/mL  10.80 (H)     Infusion on 10/04/2022   Component Date Value Ref Range Status   • Hematocrit 10/04/2022 49.7  37.5 - 51.0 % Final   • Ferritin 10/04/2022 105.70  30.00 - 400.00 ng/mL Final       Adult Transthoracic Echo Complete W/ Cont if Necessary Per Protocol    Result Date: 9/19/2022  Narrative: 1.  Normal left ventricular size and systolic function, LVEF 55-60%. 2.  Mild hypokinesis of the inferior wall. 3.  Normal LV diastolic filling pattern. 4.  Normal right ventricular size and systolic function. 5.  Mild to moderate concentric LVH. 6.  Normal left atrial volume index. 7.  No significant valvular abnormalities.    Cardiac Catheterization/Vascular Study    Result Date: 9/17/2022  Narrative: Recommendations: · Enteric-coated aspirin indefinitely · Uninterrupted dual antiplatelet therapy (enteric-coated baby aspirin plus Brilinta) for minimum of 30 months.  Consideration given to indefinite dual antiplatelet therapy · Initiation of anticoagulation therapy with Eliquis 5 mg orally twice daily (\"off-label\" use) for recurrent arterial thrombosis (coronary " and cerebral).  Hypercoagulable state suspected · Outpatient referral to hematology for hypercoagulable state work-up · Outpatient referral to cardiac rehab · Counseling regarding essential need to maintain compliance with medications · Echocardiogram in a.m. to evaluate LV systolic function post-ACS Impression: Severe single-vessel coronary artery disease with multiple sites of plaque rupture and coronary thrombosis within the RCA No evidence of in-stent restenosis Successful catheter-based revascularization of the proximal mid and distal RCA with placement of multiple drug-eluting stents Stent deployment optimization with OCT Suspected hypercoagulable state leading to multiple episodes of arterial thrombosis.  No venous thrombosis history Indication: Acute inferior ST elevation myocardial infarction --------------------------------------------------------------------------- ------------------------------------------- Clinical History: This is a 42-year-old male patient with known coronary artery disease and suspected hypercoagulable state with prior cerebral and coronary thrombosis who presents to the emergency room with acute onset of severe anterior chest pressure and heaviness.  Initial twelve-lead electrocardiogram showed pathologic Q waves in the inferior leads with subtle J-point elevation but no highly suspicious findings for ST elevation myocardial infarction.  The patient had escalation of chest discomfort and repeat twelve-lead electrocardiogram showed evidence of inferior ST elevation myocardial infarction. Procedures performed: 1. Bilateral selective coronary angiography 2. Left heart catheterization 3. Balloon angioplasty x2-RCA 4. Drug-eluting stent placement x3-RCA 5. OCT x2-RCA  Access:   5\6 Bah[place]ian Slender arterial hemostasis sheath placed via right radial artery; arterial sheath removed in the cardiac catheterization laboratory with application of a transradial band for patent hemostasis.  Procedure narrative: The procedure was explained in detail to the patient, including risks benefits and alternatives.  The patient expressed understanding and a desire to proceed. Delfino's testing demonstrated excellent collateral circulation to both hands.  The patient was brought to the cardiac catheterization laboratory where the right wrist was prepped and draped in usual sterile fashion.  One percent lidocaine was infiltrated into the skin overlying the right radial artery.  Access was obtained from the right radial artery utilizing the micropuncture technique and a 5\6 Venezuelan Slender hemostasis sheath was placed without difficulty.  The standard antispasm cocktail as well as 4000 units of unfractionated heparin was administered.  Retrograde catheterization was performed using a 6 Venezuelan JR4 diagnostic catheter to engage  the right coronary artery and a 6 Venezuelan Tiger diagnostic catheter to engage the left main coronary artery.  Hand injected angiograms was performed.  Contrast ventriculogram was not performed.  Left ventricular and aortic pullback pressures was obtained with the 6 Venezuelan JR4 diagnostic catheter.  For the details of the interventional procedure, refer to the dictation below.  Estimated Blood Loss: Negligible Total Contrast: 240 mL Fluoro Time: 10.2-minute Radiation Dose: 1459 mGy (air kerma) Angiographic Findings: · Coronary circulation is right dominant · Left main: The left main coronary artery trifurcates into the left anterior descending ramus intermedius and circumflex coronary arteries.  The left main coronary artery has no significant disease. · LAD: The left anterior descending gives rise to the diagonal branches as well as multiple septal perforators before terminating as an apical recurrent branch.  The left anterior descending has minor luminal irregularities. · LCX: The ramus intermedius branch has minor luminal irregularities.  The circumflex coronary artery is a nondominant vessel  "giving rise to the obtuse marginal branches.  The circumflex coronary artery and its branches have minor luminal irregularities. · RCA: The right coronary artery is a large caliber ectatic vessel.  The vessel demonstrates severe tortuosity with a proximal \"inverted trujillo's crook\" anatomy.  The right coronary artery is dominant for the posterior circulation.  The proximal RCA has been previously stented.  The stent is widely patent without flow-limiting stenosis or evidence of in-stent restenosis.  There was an ostial less than 50% stenosis.  Coronary thrombus was identified just distal to the original RCA stent in an area of severe tortuosity.  In addition, the distal RCA demonstrates a large thrombus burden.  Stenoses in these locations was greater than 90% severity. Hemodynamics: LV pressure: 100/ post a-10 mmHg Ao pressure: 100/70 Intervention Summary Lesion #1 Location:    Distal RCA Stenosis pre-PCI:   95% Stenosis post-PCI:  0% JENNIFER flow pre-PCI:  3 JENNIFER flow post-PCI:  3 ACC AHA class lesion: B OCT-distal RCA Proximal Edge Dissection: No Distal Edge Dissection: No Acceptable Stent Apposition (<3 mm length, >0.3 mm from wall): Yes Acceptable Stent Expansion Index (>0.8): Yes Goal MSA (>4.5 mm^2): Yes Lesion #2 Location:                               Mid RCA Pre-PCI stenosis:                    95% Post-PCI stenosis:                   0% Pre-PCI JENNIFER flow:                   3 Post-PCI JENNIFER flow:                  3 ACC AHA lesion class:             C OCT-mid RCA Proximal Edge Dissection: No Distal Edge Dissection: No Acceptable Stent Apposition (<3 mm length, >0.3 mm from wall): Yes Acceptable Stent Expansion Index (>0.8): Yes Goal MSA (>4.5 mm^2): Yes Lesion #3 Location:                                Proximal RCA Pre-PCI stenosis:                     70% Post-PCI stenosis:                    0% Pre-PCI JENNIFER flow:                    3 Post-PCI JENNIFER flow:                  3 ACC AHA lesion class:             A " OCT-proximal RCA Proximal Edge Dissection: No Distal Edge Dissection: No Acceptable Stent Apposition (<3 mm length, >0.3 mm from wall): Yes Acceptable Stent Expansion Index (>0.8): Yes Goal MSA (>4.5 mm^2): Yes Guide:   6 Albanian AL 0.75 Anticoagulation:  Heparin The patient was given additional 4000 units of unfractionated heparin followed by 1000 units of unfractionated heparin yielding an ACT of 310.  The patient was started on a double Integrilin bolus and drip protocol.  After reengage in the right coronary artery with the guiding catheter, the multiple areas of subtotal occlusion throughout the right coronary artery was crossed with a Run-through guidewire.  Balloon angioplasty was performed in the mid RCA utilizing a 4.0 x 20 mm TREK balloon dilated to 8 cody for 60 seconds.  Balloon angioplasty was then performed in the distal right coronary artery utilizing a 2.5 x 20 mm TREK balloon dilated to 8 cody for 60 seconds.  Stenting was then performed in the distal RCA utilizing a 2.75 x 15 mm Xience drug-eluting stent.  The proximal 2/3 of the stent was further dilated utilizing a 3.5 x 12mm NC TREK balloon dilated to 16 cody for 20 seconds.  OCT was performed which showed no proximal or distal edge dissection.  The stent was completely apposed to the vessel wall and completely expanded with differential sizing.  Next, stenting of the mid RCA was performed utilizing a 4.5 x 23 mm Xience drug-eluting stent dilated to 18 cody for 20 seconds.  This stent overlapped with the original proximal RCA stent.  A final 4.5 x 12 mm Xience drug-eluting stent was deployed proximal to the original stent with overlap.  OCT was performed which showed no evidence of proximal or distal edge dissection.  The stent was completely opposed to the vessel wall and completely expanded.  The angiographic result was excellent throughout. Complications: None apparent     XR Chest 1 View    Result Date: 9/17/2022  Narrative: PORTABLE CHEST   9/17/2022 12:30 PM  HISTORY: Chest pain  COMPARISON:   None  FINDINGS: The cardiac silhouette is normal in size. The mediastinal and hilar contours are unremarkable.  The lungs are clear. There is no pneumothorax. The visualized osseous structures demonstrate no acute abnormalities.      Impression: No acute cardiopulmonary process.      This report was signed and finalized on 9/17/2022 12:56 PM by Jenifer Peng MD.  Patient was admitted to stroke neurology for possible acute stroke. CT head showed hypodensities concerning for stroke and CTA show right M1 stenosis on admission. TPA was not administered as patient presented outside of the 4.5 hour TPA window. Thrombectomy was not preformed as there was no apparent large vessel occlusion on CTA head and neck. MRI head showed stroke throughout right MCA territory. Echocardiogram was without thrombus or PFO. Stroke labs revealed TSH 18.8, , Hba1c 4.9. Hypercoagulation and vasculitis work up was collected. Conventional angiogram was preformed with results suggestive of drug induced vasculopathy. Patient denies recent use of stimulants. He does take over the counter supplements to augment his weight lifting preformance. We do not know the components of these supplements. Patient was instructed to discontinue use of supplements and clear any new supplements through his PCP. Patient was educated by team on proper nutrition and stroke risk factors. PT/OT evaluated and recommended home with assist and outpatient PT, Neuropsychology consulted for depression and cognitive screening as per below. Follow up with  neurology in 6-8 weeks. NIHSS was recheck prior to discharge and only significant for slight drift in left upper extremity as documented below.     Elevation of TSH and slightly below normal T4 suggest subclinical hypothyroidism, however, since these labs were obtained during acute illness and with patient taking unknown substances it is recommended that  patient have this recheck in several weeks by PCP. Additionally, patient reported anxiety throughout stay treated with vistaril. Relaxation techniques, therapy, and pharmacological therapies were discussed.    DIAGNOSTIC AND PROCEDURAL EVENTS:   -    REQUESTING PHYSICIAN: CHAR RUCKER  REASON FOR EXAMINATION/PROCEDURE: RAD PDP:Y * CVA     EXAMINATION / PROCEDURE:    CTA Head Jan 22 2018 - 21:23; CTA Neck Jan 22 2018 - 21:23;     CLINICAL INDICATION:  CVA.    TECHNIQUE:   Routine contiguousaxial CT images of the head were obtained without   contrast administration.    Contrast-enhanced CT angiogram of the head and neck was obtained after   administration of intravenous iodinated contrast using 0.6 mm axial slice   thickness with multiplanar reformations and maximum intensity   projections. In addition, 3D images were created and reviewed.    Total DLP (Dose-Length Product): 576.56 mGy.cm. Please note: The reported   value represents the total of one or more individual components during   the CT acquisition on this date and at this time, and as such, the same   value may appear in more than one CT report depending on the   interpreting/reporting physicians.    COMPARISON:  Correlation with noncontrast head CT obtained same date at 1353 hours,   [facility].    FINDINGS:  Hypodensity is again noted in the lateral right occipital lobe, adjacent   lower right parietal lobe, and adjacent temporal lobe, consistent with   recent ischemic insult. Hypodensity is also seen in the anterior and   midportion of the right putamen. The CSF spaces remain clear. No midline   shift or attenuation of the basilar cisterns.     CTA Head:  There is normal vascular anatomy. There is no evidence of acute   occlusion, evidence of dissection, or pseudoaneurysm. There appears to be   mild narrowing of the mid right MCA. The intracranial venous structures   are also fairly well opacified and appear unremarkable.    CTA Neck:  There is  normal vascular anatomy. There is no evidence of acute  occlusion, dissection, or pseudoaneurysm. There is 0% stenosis of the ICA   origins by NASCET criteria.     IMPRESSION:   1. Hypodensity seen in the lateral right occipital lobe, adjacent lower   right parietal lobe, adjacent temporal lobe, and right putamen,   consistent with recent ischemic insult, as seen on previous CT earlier in   the day.    2. No evidence of acute extracranial or intracranial large artery   occlusion. There does appear to be mild stenosis of the right MCA.    CRITICAL RESULT:   No.    COMMUNICATION:  Per this written report..     Verified by: OCTAVIANO NEVAREZ M.D. on Jan 22 2018 10:22P  Transcribed by: RAHUL on Jan 22 2018 10:22P  Dictated by: OCTAVIANO NEVAREZ M.D. on Jan 22 2018 10:09P    REQUESTING PHYSICIAN: CAMPBELL FLORES  REASON FOR EXAMINATION/PROCEDURE: RAD PDP:Y * Stroke     EXAMINATION / PROCEDURE:    MR Head WO IVCON Jan 23 2018 - 02:34;     CLINICAL INDICATION:   Stroke. According to electronic medical record note from this date the   patient's symptoms began approximately one week ago.    TECHNIQUE:  Multiplanar multiecho sequences were performed through the brain   utilizing T1 and T2 weighting, as well as either axial susceptibility   weighted or gradient echo sequences, and axial diffusion weighted images.   Imaging was performed without contrast administration.    COMPARISON:  CTA head and outside hospital CT head 1/22/2018 from [facility].    FINDINGS:  Diagnostic Quality: Adequate.    The ventriclesand sulci are normal in size.    There are areas of T2 hyperintensity and associated restricted diffusion   within the lateral right occipital lobe, right parietal lobe, right   frontal lobe, right temporal lobe, and anterior and midportion of the   right basal ganglia.    There are areas of susceptibility artifact within the right occipital,   right parietal, right frontal, right temporal lobes and right basal   ganglia  compatible with microhemorrhage. There are also areas of T1   shortening related to these areas of infarction. There is mild associated   swelling. There may be a few additional nonspecific white matter lesions.    Vascular Flow Voids: Grossly normal but better evaluated on the CT   angiogram.    Paranasal Sinuses and MastoidAir Cells: Minimal ethmoid sinus mucosal   thickening. The nasal septum is deviated towards the right.    Orbits: No definite masses within the limitations of the study.    Extracranial Findings: None.    Craniocervical Junction and Skull Base: No tonsillar ectopia or mass is   present. Decreased marrow signal within the cervical spine on the   T1-weighted images.     IMPRESSION:  Acute ischemic infarct within the right MCA territory as described above.   There are varying degrees of restricted diffusion within the areas of   infarction as well as areas of microhemorrhages well as petechial   hemorrhage with T1 shortening. This suggests areas of infarction of   varying ages, which would be compatible with the patient's symptoms   beginning approximately a week ago.    Decreased marrow signal within the cervical spine can be due to any   marrow involving processes including anemias or neoplastic involvement.    ADDENDUM: Please note that there has been some editing of the preliminary   report at the time of final study review.    CRITICAL RESULT:  No.    COMMUNICATION:  Per this written report.    By electronically signing this report, I, the attending physician, attest   that I have personally reviewed the images/data for the above  examination(s) and agree with the final edited report.     Verified by: WILLIAM BALLESTEROS M.D. on 2018 9:07A  Transcribed by: [facility] on 2018 2:42A  Dictated by: PANTERA RAINEY M.D. on 2018 2:42A    Study ID: [id number]  Name: AMI DIXON  MRN: [id number]  : 1980 (M/d/yyyy)  +-----------+  +-----------------+ : :  : : [facility] : :  : :  [place] : :  : : [address] : :  +-----------------+ [place] [address] +-----------+    +------------------------------------------------------------------------------------  -+  :Name: AMI DIXON Study Date: 2018 10:02 AM BP: 144/103 mmH  g:  :MRN: [id number] Patient [facility]: [facility]^45^A HR: 66   :  :: 1980 (M/d/yyyy) Gender: Male Height: 69 in   :  :Age: 37 yrs Ethnicity:  Weight: 205 lb   :  :Reason For Study: HTN and Stroke BSA: 2.1 m2   :  :History: HTN   :  :Ordering Physician: Ric DUMONT,   :  :Jayy   :  :Performed By: Suly Bess   :  :   :  +------------------------------------------------------------------------------------  -+    Interpretation Summary  A complete two-dimensional transthoracic echocardiogram was performed (2D, M-mode,  Doppler and color flow Doppler). There is no comparison study available. The rate and  rhythm during this exam was most suggestive of normal sinus at 60-70bpm.    1) Normal LV size with borderline normal LV systolic function (LVEF of 50-55%). No  regional wall motion abnormalities noted.  2) Normal RV size with normal RV systolic function.  3) No significant valvular stenosis or regurgitation.  4) No pericardial effusion.  5) The E/e' ratio is most consistent with 'a normal' left atrial pressure.  6) Intravenous injection of agitated saline demonstrates no evidence of intracardiac  or intrapulmonary shunt.      MMode/2D Measurements & Calculations  IVSd: 1.3 cm LVIDd: 4.8 cm ESV(Teich): 50.3 ml  LVIDs: 3.5 cm  LVPWd: 1.3 cm  _____________________________________________________________________    LV mass(C)d: 246.3 grams LVLd apical: 9.0 cm LVAd sax MV: 15.6 cm2  LVLs apical: 7.2 cm  LVAs sax MV: 10.1 cm2  _____________________________________________________________________  EDV(bullet): 116.8 ml  ESV(bullet): 60.2 ml  EF(bullet): 48.4 %      Doppler Measurements & Calculations  PA acc time: 0.11 sec PA pr(Accel): 31.5 mmHg        Left  Ventricle  The left ventricular end-diastolic dimension is normal for men (4.2-5.4cm). There is  no thrombus. A false chord is noted (normal variant). There is mild concentric left  ventricular hypertrophy. The visually estimated LVEF is '50-55%'. No regional wall  motion abnormalities noted.      Right Ventricle  The right ventricle is normal size. There is normal right ventricular wall thickness.  The right ventricular systolic function is normal. The estimated global right  ventricular systolic function based upon the tricuspid annular plane of systolic  excursion (TAPSE) is 'normal (>20mm)'.      Atria  The left atrial volume index (ml/m2) is 'normal (16-34ml/m2)'. Right atrial size is  normal. Intravenous injection of agitated saline demonstrates no evidence of  intracardiac orintrapulmonary shunt.      Mitral Valve  The mitral valve is normal in appearance. There is no evidence of mitral valve  prolapse. There is no mitral valve stenosis. There is trace mitral regurgitation.      Tricuspid Valve  The tricuspid valve is normal. There is no tricuspid valve prolapse. There is no  tricuspid stenosis. There is trace tricuspid regurgitation. Unable to estimate RVSP  due to inadequate TR signal.      Aortic Valve  The aortic valve appears to be anatomically normal (trilealet). The aortic valve opens  well. The aortic valve annulus is mildly calcified. No hemodynamically significant  valvular aortic stenosis. No aortic regurgitation is present.      Pulmonic Valve  The pulmonic valve is not well seen, but is grosslynormal. There is no pulmonic  valvular stenosis. Trace pulmonic valvular regurgitation.      Great Vessels  The aorta at the Sinuses of Valsalva (inner edge to inner edge method) is 3.3 cm in  diameter. The ascending aorta is 3.4 cm in diameter, at the maximal area visualized.  The pulmonary artery is normal size.      Pericardium/Pleural  There is no pericardial effusion. There is no pleural  effusion.      Diastolic LV function  The E/e' ratio is most consistent with 'a normal' left atrial pressure.    1.  Coronary angiography 7/14/2021              1.  Severe thrombotic stenosis of the proximal to mid RCA                           -Successful PCI with placement of CHRISTOPHE x1 to the proximal to mid RCA              2.  Nonobstructive disease in left coronary system     Pertinent Test Results:   1.  Echocardiogram 7/15/2021              1.  Normal left ventricular size and low normal LV systolic function, LVEF 50-55%              2.  Mild to moderate concentric LVH              3.  No significant valvular abnormalities  Ari is a 42-year-old male who presented with an inferior STEMI, subsequent found to have significant thrombosis in the mid and distal RCA.  Underwent PCI without complication.  He was subsequently found to have significant thrombosis of the proximal RCA distal to the prior stent, as well as in the mid and distal RCA.  He subsequently went PCI to the proximal, mid, and distal RCA with CHRISTOPHE.  The patient tolerated the procedure well without significant periprocedural complications.  On presentation, he was also noted to have significant leukocytosis as well as evidence of suspected polycythemia vera.  Due to concern for possible underlying hypercoagulable disorder, post PCI the patient was started on DAPT with aspirin and Brilinta in addition to Eliquis for anticoagulation    [place] [address]    CLIA# [id number] MR #: [id number]    AMI DIXON    1980 (Age: 37)  MW    Collect Date: 1/24/2018 00:00    Receipt Date: 1/25/2018 08:58    Page 1        DEPARTMENT OF PATHOLOGY    AND LABORATORY MEDICINE    CLINICAL MOLECULAR AND GENOMICS PATHOLOGY    Phone: [phone]     Fax: [phone]    Email: [email or web address]     [id number]    ATTENDING MD: NICANOR Campos Jr, M.D  Service: ZOFIA Location: A06B OTHER MD(S): ALIVIA Larios MD  Client: [facility] Reported: 2/2/2018 07:46    DIRECTOR/SUPERVISOR: SIRENA Castro    Collected: 1/24/2018 00:00     MG, F2 (Prothrombin) / F5 (Factor V Leiden) RTPCR         Interpretation    Analytic Interpretation:  No evidence of coagulation factor 5 allele (F5.0001,    c.1691G>A, p.Tci158Egw; formerly known as Factor V Leiden); no evidence of    coagulation factor 2 allele (F2.0009; c.57039T>A; formerly known as prothrombin    [id number]).     This patient does not have a risk for venous thrombosis due to the most common    genetic causes (F5.0001, c.1691G>A, p.Pde922Nkn or F2.0009; c.70608W>A).  Clinical    Interpretation:  The patient does not have an increased hereditary risk of venous    thrombosis due to the most common disease-associated F5 or F2 mutations [Rhianna, PM,    et al. (1997) ULISSES, 277, 8782-2515; Gerhardt A et al. (2000) N Engl J Med  342:    374-80; CLARA Novak. et al. (2001) Genetics in Medicine 3:,139-48; KAUSHAL Lujan et al.    (2001) Thromb Haemost 86:809-816.]  This test cannot rule out other genetic or    acquired causes of venous thrombosis.          Polymerase chain reaction (PCR) amplification of a region of the coagulation factor 5    gene (F5; [facility]:3542; located at 1q23; formerly known as the factor V gene) and the    coagulation factor 2 gene (F2; HGNC:3535; located at 93r67-o72; formerly known as the    thrombin or prothrombin gene), followed by melting curve analysis with fluorescence    resonance energy transfer (FRET)-labeled oligonucleotide probes [Jennifer et al. (2001)    Am J Clin Pathol 115: 439-47; von Gerasen et al. (1999) Clin Chem 45:694-6].        This test was developed and its performance characteristics determined by the    Clinical Molecular and Genomic Pathology Laboratory at the [facility].     It has not been cleared or approved by the U.S. Food and Drug Administration.  This    test does not require FDA approval.  This test is used for clinical purposes.   It    should not be regarded as investigational or for research.  This laboratory is    certified under the Clinical Laboratory Improvement Amendments of 1988 (CLIA-88) as    qualified to perform high complexity clinical laboratory testing.         This service may have been rendered in part by a resident.  A pathologist has    personally reviewed the test results and has rendered and is responsible for the    diagnosis that appears on the report.                                                                                                                                                                                                    Electronically Signed Out             dsd/2/1/2018 DAY Saha M.D.     CLINICAL INDICATION/HISTORY:    Hypercoag work up    SPECIMEN(S) RECEIVED:    A:  Blood    ICD:   F:F2F5; [id number], [id number]   [facility]         Assessment / Plan      Assessment/Plan:     1. Polycythemia (Primary)  2. Leukocytosis, unspecified type  3.  Recurrent arterial thromboses  4.  Family history of sudden death  The patient has an extensive history of recurrent arterial thromboses in the setting of an elevated hemoglobin and a mild persistent leukocytosis.  He has been labeled as having polycythemia vera in the past and was previously on intermittent phlebotomy.  -I have reviewed his extensive hypercoagulable work-up which was performed at [facility] in 2018, which does not demonstrate an inherited thrombophilia or evidence of lupus anticoagulant/antiphospholipid antibody syndrome.  -I reviewed his BCR able, JAK2, extended JAK2, and ALICE R testing, which shows no mutation concerning for underlying polycythemia.  A bone marrow biopsy could be performed to exclude the 5% of conditions that are associated with negative mutational status.   -However on further discussion today he is taking Belkis/ashweganda.  We reviewed the supplements utilizing [facility] about herbs website, and they have are  associated with an increased risk for testosterone elevation and are likely contributing to his ongoing secondary polycythemia.  I discussed the recommendation to avoid all herbal supplements as this most likely contributed to his polycythemia and continued use would therefore likely increase his risk for further arterial thrombosis.  He is willing to discontinue.  We are going to continue his phlebotomy for now and will closely monitor with supplement discontinuation.  If he continues to experience polycythemia requiring phlebotomy, we can reconsider bone marrow biopsy to definitively exclude an underlying MPN.  However my suspicion for secondary polycythemia is very high so I  recommended against this at present  -He continues on aspirin and Eliquis.    5.  Depression with recent suicidal ideation  -Recent therapy change and following closely with psychiatry and psychology.  Reports mood is stable today.         Follow Up:   No follow-ups on file.     Linda Breen MD  Hematology and Oncology

## 2022-10-12 ENCOUNTER — PRIOR AUTHORIZATION (OUTPATIENT)
Dept: FAMILY MEDICINE CLINIC | Facility: CLINIC | Age: 42
End: 2022-10-12

## 2022-10-12 NOTE — TELEPHONE ENCOUNTER
A PRIOR AUTH HAS BEEN STARTED THROUGH [employer] FOR RISPERIDONE.    WAITING ON A RESPONSE FROM THE INSURANCE.    Key: [id number]

## 2022-10-13 PROCEDURE — 81219 CALR GENE COM VARIANTS: CPT

## 2022-10-13 PROCEDURE — 81338 MPL GENE COMMON VARIANTS: CPT

## 2022-10-13 PROCEDURE — 81279 JAK2 GENE TRGT SEQUENCE ALYS: CPT

## 2022-10-19 ENCOUNTER — TELEPHONE (OUTPATIENT)
Dept: CARDIOLOGY | Facility: CLINIC | Age: 42
End: 2022-10-19

## 2022-10-19 NOTE — TELEPHONE ENCOUNTER
CONTACTED PATIENT IN REGARDS TO MISSED APPOINTMENT. PATIENT STATED THAT HE WAS HAVING TRANSPORTATION ISSUES. APPOINTMENT RESCHEDULED.

## 2022-10-25 ENCOUNTER — READMISSION MANAGEMENT (OUTPATIENT)
Dept: CALL CENTER | Facility: HOSPITAL | Age: 42
End: 2022-10-25

## 2022-10-25 NOTE — OUTREACH NOTE
AMI Week 4 Survey    Flowsheet Row Responses   [facility]   Does the patient have one of the following disease processes/diagnoses(primary or secondary)? Acute MI (STEMI,NSTEMI)   Week 4 attempt successful? No          PAULINE PÉREZ - Registered Nurse

## 2022-10-28 ENCOUNTER — OFFICE VISIT (OUTPATIENT)
Dept: CARDIOLOGY | Facility: CLINIC | Age: 42
End: 2022-10-28

## 2022-10-28 VITALS
SYSTOLIC BLOOD PRESSURE: 124 MMHG | DIASTOLIC BLOOD PRESSURE: 78 MMHG | BODY MASS INDEX: 27.85 KG/M2 | OXYGEN SATURATION: 97 % | WEIGHT: 188 LBS | HEIGHT: 69 IN | HEART RATE: 80 BPM

## 2022-10-28 DIAGNOSIS — I10 ESSENTIAL HYPERTENSION: ICD-10-CM

## 2022-10-28 DIAGNOSIS — E78.00 PURE HYPERCHOLESTEROLEMIA: ICD-10-CM

## 2022-10-28 DIAGNOSIS — I25.10 CORONARY ARTERY DISEASE INVOLVING NATIVE CORONARY ARTERY OF NATIVE HEART WITHOUT ANGINA PECTORIS: Primary | ICD-10-CM

## 2022-10-28 DIAGNOSIS — D75.1 POLYCYTHEMIA: ICD-10-CM

## 2022-10-28 PROCEDURE — 99214 OFFICE O/P EST MOD 30 MIN: CPT | Performed by: NURSE PRACTITIONER

## 2022-10-28 RX ORDER — CITALOPRAM 20 MG/1
TABLET ORAL
COMMUNITY
Start: 2022-10-12

## 2022-10-31 ENCOUNTER — APPOINTMENT (OUTPATIENT)
Dept: ONCOLOGY | Facility: HOSPITAL | Age: 42
End: 2022-10-31

## 2022-10-31 ENCOUNTER — HOSPITAL ENCOUNTER (EMERGENCY)
Facility: HOSPITAL | Age: 42
Discharge: ANOTHER HOSPITAL | End: 2022-11-01
Attending: EMERGENCY MEDICINE | Admitting: EMERGENCY MEDICINE

## 2022-10-31 VITALS
TEMPERATURE: 97.6 F | SYSTOLIC BLOOD PRESSURE: 114 MMHG | HEART RATE: 60 BPM | DIASTOLIC BLOOD PRESSURE: 80 MMHG | BODY MASS INDEX: 27.4 KG/M2 | RESPIRATION RATE: 16 BRPM | HEIGHT: 69 IN | OXYGEN SATURATION: 97 % | WEIGHT: 185 LBS

## 2022-10-31 DIAGNOSIS — T14.91XA SUICIDAL BEHAVIOR WITH ATTEMPTED SELF-INJURY: Primary | ICD-10-CM

## 2022-10-31 LAB
ALBUMIN SERPL-MCNC: 3.9 G/DL (ref 3.5–5.2)
ALBUMIN/GLOB SERPL: 1.3 G/DL
ALP SERPL-CCNC: 64 U/L (ref 39–117)
ALT SERPL W P-5'-P-CCNC: 125 U/L (ref 1–41)
AMPHET+METHAMPHET UR QL: NEGATIVE
AMPHETAMINES UR QL: NEGATIVE
ANION GAP SERPL CALCULATED.3IONS-SCNC: 7.3 MMOL/L (ref 5–15)
APAP SERPL-MCNC: <5 MCG/ML (ref 0–30)
AST SERPL-CCNC: 52 U/L (ref 1–40)
BARBITURATES UR QL SCN: NEGATIVE
BASOPHILS # BLD AUTO: 0.07 10*3/MM3 (ref 0–0.2)
BASOPHILS NFR BLD AUTO: 0.9 % (ref 0–1.5)
BENZODIAZ UR QL SCN: NEGATIVE
BILIRUB SERPL-MCNC: 0.3 MG/DL (ref 0–1.2)
BILIRUB UR QL STRIP: NEGATIVE
BUN SERPL-MCNC: 14 MG/DL (ref 6–20)
BUN/CREAT SERPL: 12.3 (ref 7–25)
BUPRENORPHINE SERPL-MCNC: NEGATIVE NG/ML
CALCIUM SPEC-SCNC: 9.2 MG/DL (ref 8.6–10.5)
CANNABINOIDS SERPL QL: POSITIVE
CHLORIDE SERPL-SCNC: 101 MMOL/L (ref 98–107)
CLARITY UR: CLEAR
CO2 SERPL-SCNC: 29.7 MMOL/L (ref 22–29)
COCAINE UR QL: NEGATIVE
COLOR UR: YELLOW
CREAT SERPL-MCNC: 1.14 MG/DL (ref 0.76–1.27)
DEPRECATED RDW RBC AUTO: 43.5 FL (ref 37–54)
EGFRCR SERPLBLD CKD-EPI 2021: 83.4 ML/MIN/1.73
EOSINOPHIL # BLD AUTO: 0.45 10*3/MM3 (ref 0–0.4)
EOSINOPHIL NFR BLD AUTO: 5.5 % (ref 0.3–6.2)
ERYTHROCYTE [DISTWIDTH] IN BLOOD BY AUTOMATED COUNT: 13.2 % (ref 12.3–15.4)
ETHANOL BLD-MCNC: <10 MG/DL (ref 0–10)
ETHANOL UR QL: <0.01 %
GLOBULIN UR ELPH-MCNC: 3 GM/DL
GLUCOSE SERPL-MCNC: 90 MG/DL (ref 65–99)
GLUCOSE UR STRIP-MCNC: NEGATIVE MG/DL
HCT VFR BLD AUTO: 44.6 % (ref 37.5–51)
HGB BLD-MCNC: 15.7 G/DL (ref 13–17.7)
HGB UR QL STRIP.AUTO: NEGATIVE
HOLD SPECIMEN: NORMAL
HOLD SPECIMEN: NORMAL
IMM GRANULOCYTES # BLD AUTO: 0.02 10*3/MM3 (ref 0–0.05)
IMM GRANULOCYTES NFR BLD AUTO: 0.2 % (ref 0–0.5)
KETONES UR QL STRIP: NEGATIVE
LEUKOCYTE ESTERASE UR QL STRIP.AUTO: NEGATIVE
LYMPHOCYTES # BLD AUTO: 2.59 10*3/MM3 (ref 0.7–3.1)
LYMPHOCYTES NFR BLD AUTO: 31.6 % (ref 19.6–45.3)
MCH RBC QN AUTO: 31.7 PG (ref 26.6–33)
MCHC RBC AUTO-ENTMCNC: 35.2 G/DL (ref 31.5–35.7)
MCV RBC AUTO: 89.9 FL (ref 79–97)
METHADONE UR QL SCN: NEGATIVE
MONOCYTES # BLD AUTO: 0.74 10*3/MM3 (ref 0.1–0.9)
MONOCYTES NFR BLD AUTO: 9 % (ref 5–12)
NEUTROPHILS NFR BLD AUTO: 4.32 10*3/MM3 (ref 1.7–7)
NEUTROPHILS NFR BLD AUTO: 52.8 % (ref 42.7–76)
NITRITE UR QL STRIP: NEGATIVE
NRBC BLD AUTO-RTO: 0 /100 WBC (ref 0–0.2)
OPIATES UR QL: NEGATIVE
OXYCODONE UR QL SCN: NEGATIVE
PCP UR QL SCN: NEGATIVE
PH UR STRIP.AUTO: 7.5 [PH] (ref 5–8)
PLATELET # BLD AUTO: 254 10*3/MM3 (ref 140–450)
PMV BLD AUTO: 9.7 FL (ref 6–12)
POTASSIUM SERPL-SCNC: 4.2 MMOL/L (ref 3.5–5.2)
PROPOXYPH UR QL: NEGATIVE
PROT SERPL-MCNC: 6.9 G/DL (ref 6–8.5)
PROT UR QL STRIP: NEGATIVE
RBC # BLD AUTO: 4.96 10*6/MM3 (ref 4.14–5.8)
SALICYLATES SERPL-MCNC: <0.3 MG/DL
SARS-COV-2 RNA PNL SPEC NAA+PROBE: NOT DETECTED
SODIUM SERPL-SCNC: 138 MMOL/L (ref 136–145)
SP GR UR STRIP: 1.01 (ref 1–1.03)
TRICYCLICS UR QL SCN: NEGATIVE
UROBILINOGEN UR QL STRIP: NORMAL
WBC NRBC COR # BLD: 8.19 10*3/MM3 (ref 3.4–10.8)
WHOLE BLOOD HOLD COAG: NORMAL
WHOLE BLOOD HOLD SPECIMEN: NORMAL

## 2022-10-31 PROCEDURE — C9803 HOPD COVID-19 SPEC COLLECT: HCPCS

## 2022-10-31 PROCEDURE — 81003 URINALYSIS AUTO W/O SCOPE: CPT | Performed by: EMERGENCY MEDICINE

## 2022-10-31 PROCEDURE — 93005 ELECTROCARDIOGRAM TRACING: CPT | Performed by: EMERGENCY MEDICINE

## 2022-10-31 PROCEDURE — 99285 EMERGENCY DEPT VISIT HI MDM: CPT

## 2022-10-31 PROCEDURE — 85025 COMPLETE CBC W/AUTO DIFF WBC: CPT | Performed by: NURSE PRACTITIONER

## 2022-10-31 PROCEDURE — 80053 COMPREHEN METABOLIC PANEL: CPT | Performed by: NURSE PRACTITIONER

## 2022-10-31 PROCEDURE — 80143 DRUG ASSAY ACETAMINOPHEN: CPT | Performed by: NURSE PRACTITIONER

## 2022-10-31 PROCEDURE — 36415 COLL VENOUS BLD VENIPUNCTURE: CPT

## 2022-10-31 PROCEDURE — 82077 ASSAY SPEC XCP UR&BREATH IA: CPT | Performed by: EMERGENCY MEDICINE

## 2022-10-31 PROCEDURE — 87635 SARS-COV-2 COVID-19 AMP PRB: CPT | Performed by: EMERGENCY MEDICINE

## 2022-10-31 PROCEDURE — 80179 DRUG ASSAY SALICYLATE: CPT | Performed by: NURSE PRACTITIONER

## 2022-10-31 PROCEDURE — 80306 DRUG TEST PRSMV INSTRMNT: CPT | Performed by: EMERGENCY MEDICINE

## 2022-10-31 PROCEDURE — 99284 EMERGENCY DEPT VISIT MOD MDM: CPT

## 2022-10-31 RX ORDER — RISPERIDONE 1 MG/1
1 TABLET ORAL NIGHTLY
Status: DISCONTINUED | OUTPATIENT
Start: 2022-10-31 | End: 2022-11-01 | Stop reason: HOSPADM

## 2022-10-31 RX ORDER — SODIUM CHLORIDE 0.9 % (FLUSH) 0.9 %
10 SYRINGE (ML) INJECTION AS NEEDED
Status: DISCONTINUED | OUTPATIENT
Start: 2022-10-31 | End: 2022-11-01 | Stop reason: HOSPADM

## 2022-10-31 RX ADMIN — RISPERIDONE 1 MG: 1 TABLET ORAL at 19:54

## 2022-10-31 RX ADMIN — APIXABAN 5 MG: 2.5 TABLET, FILM COATED ORAL at 19:54

## 2022-11-04 ENCOUNTER — TELEPHONE (OUTPATIENT)
Dept: ONCOLOGY | Facility: CLINIC | Age: 42
End: 2022-11-04

## 2022-11-04 NOTE — TELEPHONE ENCOUNTER
Contacted Juanita back.  Advised her per Dr. Breen that patient will need a f/u with his PCP or whomever monitors and prescribes his psychiatric medications.  Also, notified her that a follow up with Dr. Breen was made for 11/21/22 at 2:30 at the [facility].  Juanita v/belle.

## 2022-11-04 NOTE — TELEPHONE ENCOUNTER
Caller: [facility]    Relationship: Other    Best call back number: [phone]    What is the best time to reach you: ANYTIME    Who are you requesting to speak with (clinical staff, provider,  specific staff member): SCHEDULING    What was the call regarding: PLEASE CALL [facility] TO SCHED PT A 1-2 WK HOSP F/U APPT - HE WILL BE DISCHARGED TUES OR WEDS NEXT WEEK. PLEASE CALL TODAY TO SCHED APPT.     Do you require a callback: YES

## 2022-11-21 ENCOUNTER — TELEPHONE (OUTPATIENT)
Dept: ONCOLOGY | Facility: CLINIC | Age: 42
End: 2022-11-21

## 2022-12-28 RX ORDER — ATORVASTATIN CALCIUM 80 MG/1
TABLET, FILM COATED ORAL
Qty: 90 TABLET | Refills: 3 | Status: SHIPPED | OUTPATIENT
Start: 2022-12-28

## 2022-12-29 ENCOUNTER — LAB (OUTPATIENT)
Dept: LAB | Facility: HOSPITAL | Age: 42
End: 2022-12-29
Payer: COMMERCIAL

## 2022-12-29 ENCOUNTER — TRANSCRIBE ORDERS (OUTPATIENT)
Dept: LAB | Facility: HOSPITAL | Age: 42
End: 2022-12-29
Payer: COMMERCIAL

## 2022-12-29 DIAGNOSIS — E29.1 3-OXO-5 ALPHA-STEROID DELTA 4-DEHYDROGENASE DEFICIENCY: Primary | ICD-10-CM

## 2022-12-29 DIAGNOSIS — E29.1 3-OXO-5 ALPHA-STEROID DELTA 4-DEHYDROGENASE DEFICIENCY: ICD-10-CM

## 2022-12-29 PROCEDURE — 84402 ASSAY OF FREE TESTOSTERONE: CPT

## 2022-12-29 PROCEDURE — 84403 ASSAY OF TOTAL TESTOSTERONE: CPT

## 2022-12-29 PROCEDURE — 36415 COLL VENOUS BLD VENIPUNCTURE: CPT

## 2023-01-02 LAB
TESTOST FREE SERPL-MCNC: 5.6 PG/ML (ref 6.8–21.5)
TESTOST SERPL-MCNC: 306 NG/DL (ref 264–916)

## 2023-07-31 ENCOUNTER — OFFICE VISIT (OUTPATIENT)
Dept: CARDIOLOGY | Facility: CLINIC | Age: 43
End: 2023-07-31
Payer: COMMERCIAL

## 2023-07-31 VITALS
WEIGHT: 176.8 LBS | HEART RATE: 58 BPM | HEIGHT: 69 IN | OXYGEN SATURATION: 97 % | SYSTOLIC BLOOD PRESSURE: 128 MMHG | BODY MASS INDEX: 26.19 KG/M2 | DIASTOLIC BLOOD PRESSURE: 70 MMHG

## 2023-07-31 DIAGNOSIS — I25.10 CORONARY ARTERY DISEASE INVOLVING NATIVE CORONARY ARTERY OF NATIVE HEART WITHOUT ANGINA PECTORIS: Primary | ICD-10-CM

## 2023-07-31 DIAGNOSIS — D75.1 POLYCYTHEMIA: ICD-10-CM

## 2023-07-31 DIAGNOSIS — I10 ESSENTIAL HYPERTENSION: ICD-10-CM

## 2023-07-31 DIAGNOSIS — E78.00 PURE HYPERCHOLESTEROLEMIA: ICD-10-CM

## 2023-07-31 PROCEDURE — 3078F DIAST BP <80 MM HG: CPT | Performed by: INTERNAL MEDICINE

## 2023-07-31 PROCEDURE — 99214 OFFICE O/P EST MOD 30 MIN: CPT | Performed by: INTERNAL MEDICINE

## 2023-07-31 PROCEDURE — 3074F SYST BP LT 130 MM HG: CPT | Performed by: INTERNAL MEDICINE

## 2023-07-31 RX ORDER — OXCARBAZEPINE 300 MG/1
300 TABLET, FILM COATED ORAL ONCE
COMMUNITY
Start: 2023-07-17

## 2023-07-31 RX ORDER — BUSPIRONE HYDROCHLORIDE 30 MG/1
30 TABLET ORAL ONCE
COMMUNITY
Start: 2023-07-17

## 2023-07-31 RX ORDER — LEVOTHYROXINE SODIUM 0.05 MG/1
50 TABLET ORAL DAILY
COMMUNITY
Start: 2023-07-17

## 2023-07-31 RX ORDER — TESTOSTERONE CYPIONATE 200 MG/ML
200 INJECTION, SOLUTION INTRAMUSCULAR WEEKLY
COMMUNITY
Start: 2023-06-26

## 2023-09-12 RX ORDER — LISINOPRIL 10 MG/1
10 TABLET ORAL DAILY
Qty: 30 TABLET | Refills: 6 | Status: SHIPPED | OUTPATIENT
Start: 2023-09-12

## 2023-09-12 RX ORDER — CARVEDILOL 12.5 MG/1
TABLET ORAL
Qty: 60 TABLET | Refills: 6 | Status: SHIPPED | OUTPATIENT
Start: 2023-09-12

## 2023-09-19 RX ORDER — ATORVASTATIN CALCIUM 80 MG/1
80 TABLET, FILM COATED ORAL NIGHTLY
Qty: 90 TABLET | Refills: 3 | Status: SHIPPED | OUTPATIENT
Start: 2023-09-19 | End: 2023-09-22 | Stop reason: SDUPTHER

## 2023-09-22 ENCOUNTER — HOSPITAL ENCOUNTER (EMERGENCY)
Facility: HOSPITAL | Age: 43
Discharge: HOME | End: 2023-09-22
Attending: EMERGENCY MEDICINE
Payer: COMMERCIAL

## 2023-09-22 ENCOUNTER — APPOINTMENT (OUTPATIENT)
Dept: GENERAL RADIOLOGY | Facility: HOSPITAL | Age: 43
End: 2023-09-22
Payer: COMMERCIAL

## 2023-09-22 VITALS
OXYGEN SATURATION: 99 % | SYSTOLIC BLOOD PRESSURE: 138 MMHG | WEIGHT: 175 LBS | HEIGHT: 69 IN | RESPIRATION RATE: 18 BRPM | DIASTOLIC BLOOD PRESSURE: 93 MMHG | HEART RATE: 71 BPM | BODY MASS INDEX: 25.92 KG/M2 | TEMPERATURE: 98.4 F

## 2023-09-22 DIAGNOSIS — R07.9 CHEST PAIN, UNSPECIFIED TYPE: Primary | ICD-10-CM

## 2023-09-22 LAB
ALBUMIN SERPL-MCNC: 3.9 G/DL (ref 3.5–5.2)
ALBUMIN/GLOB SERPL: 1.3 G/DL
ALP SERPL-CCNC: 82 U/L (ref 39–117)
ALT SERPL W P-5'-P-CCNC: 27 U/L (ref 1–41)
ANION GAP SERPL CALCULATED.3IONS-SCNC: 10 MMOL/L (ref 5–15)
AST SERPL-CCNC: 28 U/L (ref 1–40)
BASOPHILS # BLD AUTO: 0.09 10*3/MM3 (ref 0–0.2)
BASOPHILS NFR BLD AUTO: 0.8 % (ref 0–1.5)
BILIRUB SERPL-MCNC: 0.4 MG/DL (ref 0–1.2)
BUN SERPL-MCNC: 6 MG/DL (ref 6–20)
BUN/CREAT SERPL: 5.4 (ref 7–25)
CALCIUM SPEC-SCNC: 9.2 MG/DL (ref 8.6–10.5)
CHLORIDE SERPL-SCNC: 99 MMOL/L (ref 98–107)
CO2 SERPL-SCNC: 29 MMOL/L (ref 22–29)
CREAT SERPL-MCNC: 1.11 MG/DL (ref 0.76–1.27)
DEPRECATED RDW RBC AUTO: 42 FL (ref 37–54)
EGFRCR SERPLBLD CKD-EPI 2021: 85.6 ML/MIN/1.73
EOSINOPHIL # BLD AUTO: 0.42 10*3/MM3 (ref 0–0.4)
EOSINOPHIL NFR BLD AUTO: 3.8 % (ref 0.3–6.2)
ERYTHROCYTE [DISTWIDTH] IN BLOOD BY AUTOMATED COUNT: 12 % (ref 12.3–15.4)
GEN 5 2HR TROPONIN T REFLEX: 11 NG/L
GLOBULIN UR ELPH-MCNC: 3.1 GM/DL
GLUCOSE SERPL-MCNC: 85 MG/DL (ref 65–99)
HCT VFR BLD AUTO: 51.3 % (ref 37.5–51)
HGB BLD-MCNC: 17.5 G/DL (ref 13–17.7)
HOLD SPECIMEN: NORMAL
HOLD SPECIMEN: NORMAL
IMM GRANULOCYTES # BLD AUTO: 0.02 10*3/MM3 (ref 0–0.05)
IMM GRANULOCYTES NFR BLD AUTO: 0.2 % (ref 0–0.5)
LYMPHOCYTES # BLD AUTO: 2.83 10*3/MM3 (ref 0.7–3.1)
LYMPHOCYTES NFR BLD AUTO: 25.5 % (ref 19.6–45.3)
MAGNESIUM SERPL-MCNC: 1.8 MG/DL (ref 1.6–2.6)
MCH RBC QN AUTO: 32.5 PG (ref 26.6–33)
MCHC RBC AUTO-ENTMCNC: 34.1 G/DL (ref 31.5–35.7)
MCV RBC AUTO: 95.2 FL (ref 79–97)
MONOCYTES # BLD AUTO: 0.88 10*3/MM3 (ref 0.1–0.9)
MONOCYTES NFR BLD AUTO: 7.9 % (ref 5–12)
NEUTROPHILS NFR BLD AUTO: 6.87 10*3/MM3 (ref 1.7–7)
NEUTROPHILS NFR BLD AUTO: 61.8 % (ref 42.7–76)
NRBC BLD AUTO-RTO: 0 /100 WBC (ref 0–0.2)
PLATELET # BLD AUTO: 304 10*3/MM3 (ref 140–450)
PMV BLD AUTO: 9.4 FL (ref 6–12)
POTASSIUM SERPL-SCNC: 4.8 MMOL/L (ref 3.5–5.2)
PROT SERPL-MCNC: 7 G/DL (ref 6–8.5)
RBC # BLD AUTO: 5.39 10*6/MM3 (ref 4.14–5.8)
SODIUM SERPL-SCNC: 138 MMOL/L (ref 136–145)
TROPONIN T DELTA: 1 NG/L
TROPONIN T SERPL HS-MCNC: 10 NG/L
WBC NRBC COR # BLD: 11.11 10*3/MM3 (ref 3.4–10.8)
WHOLE BLOOD HOLD COAG: NORMAL
WHOLE BLOOD HOLD SPECIMEN: NORMAL

## 2023-09-22 PROCEDURE — 93005 ELECTROCARDIOGRAM TRACING: CPT | Performed by: EMERGENCY MEDICINE

## 2023-09-22 PROCEDURE — 84484 ASSAY OF TROPONIN QUANT: CPT | Performed by: EMERGENCY MEDICINE

## 2023-09-22 PROCEDURE — 71045 X-RAY EXAM CHEST 1 VIEW: CPT

## 2023-09-22 PROCEDURE — 96360 HYDRATION IV INFUSION INIT: CPT

## 2023-09-22 PROCEDURE — 36415 COLL VENOUS BLD VENIPUNCTURE: CPT

## 2023-09-22 PROCEDURE — 85025 COMPLETE CBC W/AUTO DIFF WBC: CPT | Performed by: EMERGENCY MEDICINE

## 2023-09-22 PROCEDURE — 80053 COMPREHEN METABOLIC PANEL: CPT | Performed by: EMERGENCY MEDICINE

## 2023-09-22 PROCEDURE — 99284 EMERGENCY DEPT VISIT MOD MDM: CPT

## 2023-09-22 PROCEDURE — 83735 ASSAY OF MAGNESIUM: CPT

## 2023-09-22 RX ORDER — VENLAFAXINE HYDROCHLORIDE 37.5 MG/1
37.5 TABLET, EXTENDED RELEASE ORAL
Qty: 10 TABLET | Refills: 0 | Status: SHIPPED | OUTPATIENT
Start: 2023-09-22 | End: 2023-10-02

## 2023-09-22 RX ORDER — LISINOPRIL 10 MG/1
10 TABLET ORAL DAILY
Qty: 10 TABLET | Refills: 0 | Status: SHIPPED | OUTPATIENT
Start: 2023-09-22

## 2023-09-22 RX ORDER — SODIUM CHLORIDE 0.9 % (FLUSH) 0.9 %
10 SYRINGE (ML) INJECTION AS NEEDED
Status: DISCONTINUED | OUTPATIENT
Start: 2023-09-22 | End: 2023-09-22 | Stop reason: HOSPADM

## 2023-09-22 RX ORDER — ASPIRIN 81 MG/1
81 TABLET ORAL DAILY
Qty: 10 TABLET | Refills: 0 | Status: SHIPPED | OUTPATIENT
Start: 2023-09-22

## 2023-09-22 RX ORDER — ATORVASTATIN CALCIUM 80 MG/1
80 TABLET, FILM COATED ORAL NIGHTLY
Qty: 10 TABLET | Refills: 0 | Status: SHIPPED | OUTPATIENT
Start: 2023-09-22

## 2023-09-22 RX ORDER — ASPIRIN 325 MG
325 TABLET ORAL ONCE
Status: DISCONTINUED | OUTPATIENT
Start: 2023-09-22 | End: 2023-09-22

## 2023-09-22 RX ORDER — CARVEDILOL 12.5 MG/1
12.5 TABLET ORAL 2 TIMES DAILY WITH MEALS
Qty: 20 TABLET | Refills: 0 | Status: SHIPPED | OUTPATIENT
Start: 2023-09-22 | End: 2023-10-02

## 2023-09-22 RX ORDER — OXCARBAZEPINE 300 MG/1
300 TABLET, FILM COATED ORAL DAILY
Qty: 10 TABLET | Refills: 0 | Status: SHIPPED | OUTPATIENT
Start: 2023-09-22

## 2023-09-22 RX ORDER — LEVOTHYROXINE SODIUM 0.05 MG/1
50 TABLET ORAL DAILY
Qty: 10 TABLET | Refills: 0 | Status: SHIPPED | OUTPATIENT
Start: 2023-09-22

## 2023-09-22 RX ORDER — BUSPIRONE HYDROCHLORIDE 30 MG/1
30 TABLET ORAL DAILY
Qty: 10 TABLET | Refills: 0 | Status: SHIPPED | OUTPATIENT
Start: 2023-09-22

## 2023-09-22 RX ORDER — THYROID 30 MG/1
30 TABLET ORAL DAILY
Qty: 10 TABLET | Refills: 0 | Status: SHIPPED | OUTPATIENT
Start: 2023-09-22 | End: 2023-10-02

## 2023-09-22 RX ADMIN — SODIUM CHLORIDE 1000 ML: 9 INJECTION, SOLUTION INTRAVENOUS at 14:50

## 2023-09-22 NOTE — DISCHARGE INSTRUCTIONS
Return to ER for any worsening symptoms.  Continue medications as prescribed.  Have sent majority of your medications for a short refill to your pharmacy, pick these up and take as prescribed.  Aloe up with cardiology, their number has been attached, call to schedule an appointment.

## 2023-09-22 NOTE — ED PROVIDER NOTES
Subjective  History of Present Illness:    This is a 43-year-old male, history of anxiety, depression, hypertension, seizures, self injures behavior, stroke, MI with several cardiac catheterizations, currently on anticoagulation and antiplatelet therapy with Eliquis and aspirin presenting to the ER today for evaluation of chest pain.  Patient reports that he was walking to the pharmacy in the heat when he began to have the symptoms after walking to this place and they were closed.  He suspects an anxiety component to this given that he was frustrated that the pharmacy was closed and he was scared that he has not gone to be able to get his medicines for the weekend.  He reports the pain is a dull ache over the anterior chest.  Nonradiating.  Does not radiate to the neck shoulders arms or back.  No current shortness of air, had mild shortness of air when the pain was worse, pain is nonpleuritic.  No fevers, no cough congestion or runny nose.  No abdominal pain.  No nausea vomiting or diarrhea.  Reports that his pain has almost completely resolved on arrival.  No lateral leg pain or swelling.  Reports that he is not on Brilinta anymore and has since switched to aspirin and Eliquis.  He reports that he will be unable to obtain majority of his medications and that he is out of mostly all of them beginning today and will be unable to obtain them over the weekend to his pharmacy closing and requesting a refill on majority of his medications for the neck several days.      Nurses Notes reviewed and agree, including vitals, allergies, social history and prior medical history.     REVIEW OF SYSTEMS: All systems reviewed and not pertinent unless noted.  Review of Systems   Constitutional:  Negative for chills and fever.   Respiratory:  Negative for shortness of breath.    Cardiovascular:  Positive for chest pain. Negative for leg swelling.   Gastrointestinal:  Negative for abdominal pain, nausea and vomiting.    Psychiatric/Behavioral:  The patient is nervous/anxious.    All other systems reviewed and are negative.    Past Medical History:   Diagnosis Date    Anxiety     Depression     Glaucoma     Hypertension     Screening for neurological condition     Seizures     Self-injurious behavior     Stroke        Allergies:    Patient has no known allergies.      Past Surgical History:   Procedure Laterality Date    CARDIAC CATHETERIZATION N/A 2021    Procedure: CORONARY ANGIOGRAPHY;  Surgeon: Farhad Grayd MD;  Location: [facility] CATH INVASIVE LOCATION;  Service: Cardiology;  Laterality: N/A;    CARDIAC CATHETERIZATION N/A 2021    Procedure: Percutaneous Coronary Intervention;  Surgeon: Farhad Grady MD;  Location: [facility] CATH INVASIVE LOCATION;  Service: Cardiology;  Laterality: N/A;    CARDIAC CATHETERIZATION N/A 2021    Procedure: Optical Coherent Tomography;  Surgeon: Farhad Grady MD;  Location: [facility] CATH INVASIVE LOCATION;  Service: Cardiology;  Laterality: N/A;    CARDIAC CATHETERIZATION N/A 2022    Procedure: Left Heart Cath;  Surgeon: René Solomon MD;  Location: [facility] CATH INVASIVE LOCATION;  Service: Cardiology;  Laterality: N/A;    CARDIAC CATHETERIZATION N/A 2022    Procedure: Percutaneous Coronary Intervention;  Surgeon: René Solomon MD;  Location: [facility] CATH INVASIVE LOCATION;  Service: Cardiology;  Laterality: N/A;    FRACTURE SURGERY Right     ankle         Social History     Socioeconomic History    Marital status: Single   Tobacco Use    Smoking status: Former     Types: Cigarettes     Quit date:      Years since quittin.7    Smokeless tobacco: Former     Types: Snuff     Quit date:    Vaping Use    Vaping Use: Never used   Substance and Sexual Activity    Alcohol use: Yes     Comment: OCC/ once a month    Drug use: Not Currently     Types: Marijuana     Comment: RARELY    Sexual activity: Defer         Family History   Problem Relation Age  "of Onset    Alcohol abuse Mother     Anxiety disorder Mother     Alcohol abuse Father     Drug abuse Father     Seizures Father     Heart attack Maternal Uncle     Dementia Maternal Grandmother     Anxiety disorder Maternal Grandmother     OCD Maternal Grandfather     Drug abuse Paternal Grandmother     Drug abuse Paternal Grandfather     Schizophrenia Cousin     ADD / ADHD Neg Hx     Bipolar disorder Neg Hx     Paranoid behavior Neg Hx     Self-Injurious Behavior  Neg Hx     Suicide Attempts Neg Hx        Objective  Physical Exam:  /87   Pulse 62   Temp 98 °F (36.7 °C) (Oral)   Resp 20   Ht 175.3 cm (69\")   Wt 79.4 kg (175 lb)   SpO2 100%   BMI 25.84 kg/m²      Physical Exam  Vitals and nursing note reviewed.   Constitutional:       General: He is not in acute distress.     Appearance: He is well-developed and normal weight. He is not ill-appearing, toxic-appearing or diaphoretic.   HENT:      Head: Normocephalic and atraumatic.   Eyes:      Extraocular Movements: Extraocular movements intact.      Pupils: Pupils are equal, round, and reactive to light.   Cardiovascular:      Rate and Rhythm: Normal rate and regular rhythm.      Pulses:           Radial pulses are 2+ on the right side and 2+ on the left side.      Heart sounds: Normal heart sounds.   Pulmonary:      Effort: Pulmonary effort is normal.      Breath sounds: Normal breath sounds. No decreased breath sounds, wheezing, rhonchi or rales.   Chest:      Chest wall: No mass, deformity, tenderness, crepitus or edema.   Abdominal:      Palpations: Abdomen is soft.   Musculoskeletal:         General: Normal range of motion.      Cervical back: Normal range of motion.      Right lower leg: No tenderness. No edema.      Left lower leg: No tenderness. No edema.   Skin:     General: Skin is warm and dry.      Capillary Refill: Capillary refill takes less than 2 seconds.   Neurological:      General: No focal deficit present.      Mental Status: He is " alert and oriented to person, place, and time.   Psychiatric:         Mood and Affect: Mood is anxious.       Procedures    ED Course:    ED Course as of 09/22/23 1748   Fri Sep 22, 2023   1426 EKG interpreted by me reveals sinus rhythm 70 no ST elevation no ectopy [PF]      ED Course User Index  [PF] Christian Reilly DO       Lab Results (last 24 hours)       Procedure Component Value Units Date/Time    CBC & Differential [[id number]]  (Abnormal) Collected: 09/22/23 1407    Specimen: Blood Updated: 09/22/23 1414    Narrative:      The following orders were created for panel order CBC & Differential.  Procedure                               Abnormality         Status                     ---------                               -----------         ------                     CBC Auto Differential[[id number]]        Abnormal            Final result                 Please view results for these tests on the individual orders.    Comprehensive Metabolic Panel [[id number]]  (Abnormal) Collected: 09/22/23 1407    Specimen: Blood Updated: 09/22/23 1438     Glucose 85 mg/dL      BUN 6 mg/dL      Creatinine 1.11 mg/dL      Sodium 138 mmol/L      Potassium 4.8 mmol/L      Comment: Slight hemolysis detected by analyzer. Results may be affected.        Chloride 99 mmol/L      CO2 29.0 mmol/L      Calcium 9.2 mg/dL      Total Protein 7.0 g/dL      Albumin 3.9 g/dL      ALT (SGPT) 27 U/L      AST (SGOT) 28 U/L      Alkaline Phosphatase 82 U/L      Total Bilirubin 0.4 mg/dL      Globulin 3.1 gm/dL      A/G Ratio 1.3 g/dL      BUN/Creatinine Ratio 5.4     Anion Gap 10.0 mmol/L      eGFR 85.6 mL/min/1.73     Narrative:      GFR Normal >60  Chronic Kidney Disease <60  Kidney Failure <15      High Sensitivity Troponin T [[id number]]  (Normal) Collected: 09/22/23 1407    Specimen: Blood Updated: 09/22/23 1438     HS Troponin T 10 ng/L     Narrative:      High Sensitive Troponin T Reference Range:  <10.0 ng/L- Negative Female for AMI  <15.0  ng/L- Negative Male for AMI  >=10 - Abnormal Female indicating possible myocardial injury.  >=15 - Abnormal Male indicating possible myocardial injury.   Clinicians would have to utilize clinical acumen, EKG, Troponin, and serial changes to determine if it is an Acute Myocardial Infarction or myocardial injury due to an underlying chronic condition.         CBC Auto Differential [[id number]]  (Abnormal) Collected: 09/22/23 1407    Specimen: Blood Updated: 09/22/23 1414     WBC 11.11 10*3/mm3      RBC 5.39 10*6/mm3      Hemoglobin 17.5 g/dL      Hematocrit 51.3 %      MCV 95.2 fL      MCH 32.5 pg      MCHC 34.1 g/dL      RDW 12.0 %      RDW-SD 42.0 fl      MPV 9.4 fL      Platelets 304 10*3/mm3      Neutrophil % 61.8 %      Lymphocyte % 25.5 %      Monocyte % 7.9 %      Eosinophil % 3.8 %      Basophil % 0.8 %      Immature Grans % 0.2 %      Neutrophils, Absolute 6.87 10*3/mm3      Lymphocytes, Absolute 2.83 10*3/mm3      Monocytes, Absolute 0.88 10*3/mm3      Eosinophils, Absolute 0.42 10*3/mm3      Basophils, Absolute 0.09 10*3/mm3      Immature Grans, Absolute 0.02 10*3/mm3      nRBC 0.0 /100 WBC     Magnesium [[id number]]  (Normal) Collected: 09/22/23 1407    Specimen: Blood Updated: 09/22/23 1438     Magnesium 1.8 mg/dL     High Sensitivity Troponin T 2Hr [[id number]]  (Normal) Collected: 09/22/23 1640    Specimen: Blood Updated: 09/22/23 1736     HS Troponin T 11 ng/L      Troponin T Delta 1 ng/L     Narrative:      High Sensitive Troponin T Reference Range:  <10.0 ng/L- Negative Female for AMI  <15.0 ng/L- Negative Male for AMI  >=10 - Abnormal Female indicating possible myocardial injury.  >=15 - Abnormal Male indicating possible myocardial injury.   Clinicians would have to utilize clinical acumen, EKG, Troponin, and serial changes to determine if it is an Acute Myocardial Infarction or myocardial injury due to an underlying chronic condition.                  XR Chest 1 View    Result Date:  9/22/2023  CLINICAL INDICATION:  Chest Pain Triage Protocol  EXAMINATION TECHNIQUE: XR CHEST 1 VW-  COMPARISON: Radiographs 9/17/2022.  FINDINGS: Lungs are clear without focal airspace consolidation. No pneumothorax or large pleural effusion. Heart and mediastinal contours are within normal limits. No acute osseous or soft tissue abnormality. No free air under hemidiaphragms.      Impression: No acute cardiopulmonary findings.  Images personally reviewed, interpreted and dictated by NGOZI Green.     This report was signed and finalized on 9/22/2023 2:39 PM by NGOZI Green.          [facility]      Initial impression of presenting illness: This is a 43-year-old male with an extensive past medical history presents emergency room today for evaluation of chest pain.  Onset of the pain was about 1230 this afternoon.    DDX: includes but is not limited to: ACS, pneumothorax, pneumonia, costochondritis, anxiety, GERD, musculoskeletal strain or sprain, other    Suspect PE is extremely less likely given that patient has been faithful to his anticoagulation and not missed any doses, pain is nonpleuritic in nature, not hypoxic or tachycardic on arrival.    Patient arrives hemodynamically stable afebrile nontachycardic and nontoxic-appearing with vitals interpreted by myself.     Pertinent features from physical exam: Well-appearing 43-year-old male in no acute distress.  Cardiac auscultation revealed regular and rhythm, lungs were clear bilaterally.  Oropharynx clear.  Plus radial pulses bilaterally.  Abdomen soft and nontender..    Initial diagnostic plan: CBC, CMP, troponin, EKG, chest x-ray, magnesium    Results from initial plan were reviewed and interpreted by me revealing CBC with white blood cell count of 11.11, otherwise unremarkable.  Troponin normal.  CMP normal.  Magnesium normal.  EKG revealed sinus rhythm rate of 70 no ST elevation and no ectopy.  Chest x-ray with no acute cardiopulmonary process.  I  personally evaluated plain film and concur with radiology interpretation.  Repeat troponin negative.  Patient is symptom-free.  Believe he be safe for an outpatient follow-up with cardiology at this time.  All labs and imaging appear reassuring.  Heart score of 2.    Diagnostic information from other sources: Chart reviewed including medications and prior history and procedures including catheterizations    Interventions / Re-evaluation: Patient had full dose aspirin by EMS prior to arrival and 1 nitro.  His symptoms have almost completely resolved.  Will give 1 L of fluids    Results/clinical rationale were discussed with patient at bedside.  With normal EKG, normal chest x-ray and 2 negative troponins and a heart score of 2 although given his history believe he would be safe for an outpatient follow-up with cardiology and return for worsening symptoms with strict return precautions.  He is symptom-free at this time and feeling much better and ready for discharge.    Consultations/Discussion of results with other physicians: N/A    Disposition plan: Discharge, give the patient a several day supply of majority of his medications as requested as he ran out today and his pharmacy was closed today and we closed all weekend.  Return precautions given.  Recommended follow-up with cardiology, recommended follow-up with the ER and return for any worsening chest pain or worsening symptoms.  He is agreeable to this plan at bedside.  -----    Final diagnoses:   Chest pain, unspecified type          Diego Veloz PA-C  09/22/23 8041

## 2023-10-09 RX ORDER — ASPIRIN 81 MG/1
81 TABLET, COATED ORAL DAILY
Qty: 30 TABLET | Refills: 3 | Status: SHIPPED | OUTPATIENT
Start: 2023-10-09

## 2023-10-30 RX ORDER — LISINOPRIL 10 MG/1
10 TABLET ORAL DAILY
Qty: 30 TABLET | Refills: 11 | Status: SHIPPED | OUTPATIENT
Start: 2023-10-30

## 2024-07-18 ENCOUNTER — TRANSCRIBE ORDERS (OUTPATIENT)
Dept: ADMINISTRATIVE | Facility: HOSPITAL | Age: 44
End: 2024-07-18
Payer: COMMERCIAL

## 2024-07-18 DIAGNOSIS — N28.89 RENAL VASCULAR DISEASE: Primary | ICD-10-CM

## 2024-07-24 ENCOUNTER — HOSPITAL ENCOUNTER (OUTPATIENT)
Dept: NUCLEAR MEDICINE | Facility: HOSPITAL | Age: 44
Discharge: HOME | End: 2024-07-24
Payer: COMMERCIAL

## 2024-07-24 DIAGNOSIS — N28.89 RENAL VASCULAR DISEASE: ICD-10-CM

## 2024-07-24 PROCEDURE — 78707 K FLOW/FUNCT IMAGE W/O DRUG: CPT

## 2024-07-24 PROCEDURE — 0 TECHNETIUM MERTIATIDE: Performed by: INTERNAL MEDICINE

## 2024-07-24 PROCEDURE — A9562 TC99M MERTIATIDE: HCPCS | Performed by: INTERNAL MEDICINE

## 2024-07-24 RX ADMIN — TECHNESCAN TC 99M MERTIATIDE 1 DOSE: 1 INJECTION, POWDER, LYOPHILIZED, FOR SOLUTION INTRAVENOUS at 15:00

## (undated) DEVICE — CATH IMG DRAGONFLY OPTIS 2.7F 135CM

## (undated) DEVICE — TBG INJ CONTRL FLXPOLY WKEEP RA 1200PSI

## (undated) DEVICE — HI-TORQUE BALANCE MIDDLEWEIGHT UNIVERSAL II GUIDE WIRE STRAIGHT TIP PAK  190 CM: Brand: HI-TORQUE BALANCE MIDDLEWEIGHT UNIVERSAL II

## (undated) DEVICE — CATH F6 ST JR 4 100CM: Brand: SUPERTORQUE

## (undated) DEVICE — CATH F6 ST JL 3.5 100CM: Brand: SUPERTORQUE

## (undated) DEVICE — TREK CORONARY DILATATION CATHETER 3.0 MM X 12 MM / RAPID-EXCHANGE: Brand: TREK

## (undated) DEVICE — GLIDESHEATH SLENDER STAINLESS STEEL KIT: Brand: GLIDESHEATH SLENDER

## (undated) DEVICE — GUIDE CATHETER: Brand: MACH1™

## (undated) DEVICE — GW INQWIRE FC PTFE STD J/1.5 .035 260

## (undated) DEVICE — ANGIOGRAPHIC CATHETER: Brand: EXPO™

## (undated) DEVICE — TREK CORONARY DILATATION CATHETER 2.50 MM X 20 MM / RAPID-EXCHANGE: Brand: TREK

## (undated) DEVICE — NC TREK CORONARY DILATATION CATHETER 4.5 MM X 12 MM / RAPID-EXCHANGE: Brand: NC TREK

## (undated) DEVICE — 12CC CONTROL SYRINGE – FR/TR/RA W/RESERVOIR: Brand: CONTROL SYRINGE

## (undated) DEVICE — RUNTHROUGH NS EXTRA FLOPPY PTCA GUIDEWIRE: Brand: RUNTHROUGH

## (undated) DEVICE — INFLATION DEVICE KIT: Brand: ENCORE™ 26 ADVANTAGE KIT

## (undated) DEVICE — RADIFOCUS OPTITORQUE ANGIOGRAPHIC CATHETER: Brand: OPTITORQUE

## (undated) DEVICE — NC TREK CORONARY DILATATION CATHETER 3.5 MM X 8 MM / RAPID-EXCHANGE: Brand: NC TREK

## (undated) DEVICE — COPILOT BLEEDBACK CONTROL VALVE: Brand: COPILOT

## (undated) DEVICE — ASMBL SPK CONTRST CONTRL

## (undated) DEVICE — CVR PROB ULTRASND GLS STRL

## (undated) DEVICE — GW EMR FIX EXCHG J STD .035 3MM 260CM

## (undated) DEVICE — TR BAND RADIAL ARTERY COMPRESSION DEVICE: Brand: TR BAND

## (undated) DEVICE — TREK CORONARY DILATATION CATHETER 4.0 MM X 20 MM / RAPID-EXCHANGE: Brand: TREK

## (undated) DEVICE — CATH IMG IVUS EAGLE EYE RAPDXNG PLAT SHT/TP 5F .014IN

## (undated) DEVICE — CONTRL CONTRST CHMBRD W/TBG72IN REUS